# Patient Record
Sex: FEMALE | Race: WHITE | HISPANIC OR LATINO
[De-identification: names, ages, dates, MRNs, and addresses within clinical notes are randomized per-mention and may not be internally consistent; named-entity substitution may affect disease eponyms.]

---

## 2017-01-19 ENCOUNTER — RECORD ABSTRACTING (OUTPATIENT)
Age: 52
End: 2017-01-19

## 2017-01-19 DIAGNOSIS — G44.209 TENSION-TYPE HEADACHE, UNSPECIFIED, NOT INTRACTABLE: ICD-10-CM

## 2017-01-19 DIAGNOSIS — Z87.39 PERSONAL HISTORY OF OTHER DISEASES OF THE MUSCULOSKELETAL SYSTEM AND CONNECTIVE TISSUE: ICD-10-CM

## 2017-01-19 DIAGNOSIS — Z92.89 PERSONAL HISTORY OF OTHER MEDICAL TREATMENT: ICD-10-CM

## 2017-01-19 DIAGNOSIS — Z87.2 PERSONAL HISTORY OF DISEASES OF THE SKIN AND SUBCUTANEOUS TISSUE: ICD-10-CM

## 2017-01-19 DIAGNOSIS — Z83.3 FAMILY HISTORY OF DIABETES MELLITUS: ICD-10-CM

## 2017-01-19 DIAGNOSIS — N93.9 ABNORMAL UTERINE AND VAGINAL BLEEDING, UNSPECIFIED: ICD-10-CM

## 2017-01-19 DIAGNOSIS — Z86.2 PERSONAL HISTORY OF DISEASES OF THE BLOOD AND BLOOD-FORMING ORGANS AND CERTAIN DISORDERS INVOLVING THE IMMUNE MECHANISM: ICD-10-CM

## 2017-03-01 ENCOUNTER — RECORD ABSTRACTING (OUTPATIENT)
Age: 52
End: 2017-03-01

## 2017-03-01 DIAGNOSIS — Z86.018 PERSONAL HISTORY OF OTHER BENIGN NEOPLASM: ICD-10-CM

## 2017-03-01 DIAGNOSIS — N83.202 UNSPECIFIED OVARIAN CYST, LEFT SIDE: ICD-10-CM

## 2017-03-01 DIAGNOSIS — Z12.72 ENCOUNTER FOR SCREENING FOR MALIGNANT NEOPLASM OF VAGINA: ICD-10-CM

## 2017-03-01 DIAGNOSIS — Z78.9 OTHER SPECIFIED HEALTH STATUS: ICD-10-CM

## 2017-03-01 DIAGNOSIS — Z87.42 PERSONAL HISTORY OF OTHER DISEASES OF THE FEMALE GENITAL TRACT: ICD-10-CM

## 2018-09-12 ENCOUNTER — APPOINTMENT (OUTPATIENT)
Dept: INTERNAL MEDICINE | Facility: CLINIC | Age: 53
End: 2018-09-12

## 2018-09-17 ENCOUNTER — EMERGENCY (EMERGENCY)
Facility: HOSPITAL | Age: 53
LOS: 0 days | Discharge: HOME | End: 2018-09-17
Attending: EMERGENCY MEDICINE

## 2018-09-17 VITALS
OXYGEN SATURATION: 99 % | TEMPERATURE: 98 F | DIASTOLIC BLOOD PRESSURE: 71 MMHG | RESPIRATION RATE: 18 BRPM | HEART RATE: 71 BPM | SYSTOLIC BLOOD PRESSURE: 121 MMHG

## 2018-09-17 VITALS
HEART RATE: 68 BPM | OXYGEN SATURATION: 99 % | DIASTOLIC BLOOD PRESSURE: 57 MMHG | TEMPERATURE: 97 F | SYSTOLIC BLOOD PRESSURE: 114 MMHG | RESPIRATION RATE: 20 BRPM

## 2018-09-17 DIAGNOSIS — R10.9 UNSPECIFIED ABDOMINAL PAIN: ICD-10-CM

## 2018-09-17 DIAGNOSIS — R73.9 HYPERGLYCEMIA, UNSPECIFIED: ICD-10-CM

## 2018-09-17 DIAGNOSIS — K52.9 NONINFECTIVE GASTROENTERITIS AND COLITIS, UNSPECIFIED: ICD-10-CM

## 2018-09-17 DIAGNOSIS — R11.2 NAUSEA WITH VOMITING, UNSPECIFIED: ICD-10-CM

## 2018-09-17 DIAGNOSIS — R93.8 ABNORMAL FINDINGS ON DIAGNOSTIC IMAGING OF OTHER SPECIFIED BODY STRUCTURES: ICD-10-CM

## 2018-09-17 LAB
ALBUMIN SERPL ELPH-MCNC: 4 G/DL — SIGNIFICANT CHANGE UP (ref 3.5–5.2)
ALP SERPL-CCNC: 71 U/L — SIGNIFICANT CHANGE UP (ref 30–115)
ALT FLD-CCNC: 27 U/L — SIGNIFICANT CHANGE UP (ref 0–41)
ANION GAP SERPL CALC-SCNC: 16 MMOL/L — HIGH (ref 7–14)
APPEARANCE UR: ABNORMAL
AST SERPL-CCNC: 18 U/L — SIGNIFICANT CHANGE UP (ref 0–41)
BASOPHILS # BLD AUTO: 0.07 K/UL — SIGNIFICANT CHANGE UP (ref 0–0.2)
BASOPHILS NFR BLD AUTO: 0.5 % — SIGNIFICANT CHANGE UP (ref 0–1)
BILIRUB SERPL-MCNC: 0.5 MG/DL — SIGNIFICANT CHANGE UP (ref 0.2–1.2)
BILIRUB UR-MCNC: NEGATIVE — SIGNIFICANT CHANGE UP
BUN SERPL-MCNC: 12 MG/DL — SIGNIFICANT CHANGE UP (ref 10–20)
CALCIUM SERPL-MCNC: 8.8 MG/DL — SIGNIFICANT CHANGE UP (ref 8.5–10.1)
CHLORIDE SERPL-SCNC: 97 MMOL/L — LOW (ref 98–110)
CO2 SERPL-SCNC: 23 MMOL/L — SIGNIFICANT CHANGE UP (ref 17–32)
COLOR SPEC: SIGNIFICANT CHANGE UP
CREAT SERPL-MCNC: 0.6 MG/DL — LOW (ref 0.7–1.5)
DIFF PNL FLD: ABNORMAL
EOSINOPHIL # BLD AUTO: 0.08 K/UL — SIGNIFICANT CHANGE UP (ref 0–0.7)
EOSINOPHIL NFR BLD AUTO: 0.6 % — SIGNIFICANT CHANGE UP (ref 0–8)
GAS PNL BLDV: SIGNIFICANT CHANGE UP
GLUCOSE SERPL-MCNC: 295 MG/DL — HIGH (ref 70–99)
GLUCOSE UR QL: >=1000
HCT VFR BLD CALC: 42.6 % — SIGNIFICANT CHANGE UP (ref 37–47)
HGB BLD-MCNC: 14.8 G/DL — SIGNIFICANT CHANGE UP (ref 12–16)
IMM GRANULOCYTES NFR BLD AUTO: 0.3 % — SIGNIFICANT CHANGE UP (ref 0.1–0.3)
KETONES UR-MCNC: 15
LACTATE SERPL-SCNC: 1.1 MMOL/L — SIGNIFICANT CHANGE UP (ref 0.5–2.2)
LEUKOCYTE ESTERASE UR-ACNC: ABNORMAL
LIDOCAIN IGE QN: 65 U/L — HIGH (ref 7–60)
LYMPHOCYTES # BLD AUTO: 16.8 % — LOW (ref 20.5–51.1)
LYMPHOCYTES # BLD AUTO: 2.2 K/UL — SIGNIFICANT CHANGE UP (ref 1.2–3.4)
MCHC RBC-ENTMCNC: 29 PG — SIGNIFICANT CHANGE UP (ref 27–31)
MCHC RBC-ENTMCNC: 34.7 G/DL — SIGNIFICANT CHANGE UP (ref 32–37)
MCV RBC AUTO: 83.4 FL — SIGNIFICANT CHANGE UP (ref 81–99)
MONOCYTES # BLD AUTO: 0.76 K/UL — HIGH (ref 0.1–0.6)
MONOCYTES NFR BLD AUTO: 5.8 % — SIGNIFICANT CHANGE UP (ref 1.7–9.3)
NEUTROPHILS # BLD AUTO: 9.98 K/UL — HIGH (ref 1.4–6.5)
NEUTROPHILS NFR BLD AUTO: 76 % — HIGH (ref 42.2–75.2)
NITRITE UR-MCNC: NEGATIVE — SIGNIFICANT CHANGE UP
PH UR: 5.5 — SIGNIFICANT CHANGE UP (ref 5–8)
PLATELET # BLD AUTO: 210 K/UL — SIGNIFICANT CHANGE UP (ref 130–400)
POTASSIUM SERPL-MCNC: 4.4 MMOL/L — SIGNIFICANT CHANGE UP (ref 3.5–5)
POTASSIUM SERPL-SCNC: 4.4 MMOL/L — SIGNIFICANT CHANGE UP (ref 3.5–5)
PROT SERPL-MCNC: 7.2 G/DL — SIGNIFICANT CHANGE UP (ref 6–8)
PROT UR-MCNC: 100
RBC # BLD: 5.11 M/UL — SIGNIFICANT CHANGE UP (ref 4.2–5.4)
RBC # FLD: 12.9 % — SIGNIFICANT CHANGE UP (ref 11.5–14.5)
SODIUM SERPL-SCNC: 136 MMOL/L — SIGNIFICANT CHANGE UP (ref 135–146)
SP GR SPEC: >=1.03 — SIGNIFICANT CHANGE UP (ref 1.01–1.03)
UROBILINOGEN FLD QL: 0.2 — SIGNIFICANT CHANGE UP (ref 0.2–0.2)
WBC # BLD: 13.13 K/UL — HIGH (ref 4.8–10.8)
WBC # FLD AUTO: 13.13 K/UL — HIGH (ref 4.8–10.8)

## 2018-09-17 RX ORDER — ONDANSETRON 8 MG/1
4 TABLET, FILM COATED ORAL ONCE
Qty: 0 | Refills: 0 | Status: COMPLETED | OUTPATIENT
Start: 2018-09-17 | End: 2018-09-17

## 2018-09-17 RX ORDER — SODIUM CHLORIDE 9 MG/ML
1000 INJECTION, SOLUTION INTRAVENOUS ONCE
Qty: 0 | Refills: 0 | Status: COMPLETED | OUTPATIENT
Start: 2018-09-17 | End: 2018-09-17

## 2018-09-17 RX ORDER — METRONIDAZOLE 500 MG
1 TABLET ORAL
Qty: 21 | Refills: 0
Start: 2018-09-17 | End: 2018-09-23

## 2018-09-17 RX ORDER — CIPROFLOXACIN LACTATE 400MG/40ML
1 VIAL (ML) INTRAVENOUS
Qty: 14 | Refills: 0
Start: 2018-09-17 | End: 2018-09-23

## 2018-09-17 RX ADMIN — SODIUM CHLORIDE 2000 MILLILITER(S): 9 INJECTION, SOLUTION INTRAVENOUS at 11:20

## 2018-09-17 RX ADMIN — ONDANSETRON 4 MILLIGRAM(S): 8 TABLET, FILM COATED ORAL at 09:32

## 2018-09-17 RX ADMIN — SODIUM CHLORIDE 2000 MILLILITER(S): 9 INJECTION, SOLUTION INTRAVENOUS at 09:28

## 2018-09-17 RX ADMIN — SODIUM CHLORIDE 1000 MILLILITER(S): 9 INJECTION, SOLUTION INTRAVENOUS at 10:00

## 2018-09-17 NOTE — ED ADULT NURSE NOTE - NSIMPLEMENTINTERV_GEN_ALL_ED
Implemented All Universal Safety Interventions:  [place] to call system. Call bell, personal items and telephone within reach. Instruct patient to call for assistance. Room bathroom lighting operational. Non-slip footwear when patient is off stretcher. Physically safe environment: no spills, clutter or unnecessary equipment. Stretcher in lowest position, wheels locked, appropriate side rails in place.

## 2018-09-17 NOTE — ED PROVIDER NOTE - NS ED ROS FT
Constitutional: See HPI.  Eyes: No visual changes, eye pain or discharge. No Photophobia  ENMT: No neck pain or stiffness. No limited ROM  Cardiac: No SOB or edema. No chest pain with exertion.  Respiratory: No cough or respiratory distress. No hemoptysis. No history of asthma or RAD.  GI: see hpi  : No dysuria, frequency or burning. No Discharge  MS: No myalgia, muscle weakness, joint pain or back pain.  Neuro: No headache or weakness. No LOC.  Skin: No skin rash.  Except as documented in the HPI, all other systems are negative.

## 2018-09-17 NOTE — ED PROVIDER NOTE - CARE PLAN
Principal Discharge DX:	Colitis  Secondary Diagnosis:	Hyperglycemia  Secondary Diagnosis:	Endometrial thickening on ultrasound

## 2018-09-17 NOTE — ED PROVIDER NOTE - ATTENDING CONTRIBUTION TO CARE
53F no pmh/meds/psh, p/w 1 day diffuse crampy abd pain assoc w 6 episodes watery diarrhea. 1 episode emesis but states it was "clear phlegm." no fever, chills. no dysuria, freq, hematuria. no cp, sob, cough. has appt w PMD at [facility] clinic next week 9/26. post menopausal. reports 1 mo weight loss approx 50 lbs. on exam, AFVSS, well mesfin nad, ncat, eomi, perrla, DRYmm, lctab, rrr nl s1s2 no mrg, abd soft +LLQ mild ttp, no rebound or rigidity, nd, aaox3, no focal deficits, no le edema or calf ttp, a/p;  Concern for colitis/diverticultitis, dehydration, electrolyte abnl, will do labs, ua, ct a/p, ivf, pain control/antiemetics, re-eval.

## 2018-09-17 NOTE — ED PROVIDER NOTE - MEDICAL DECISION MAKING DETAILS
pt found to have colitis, tolerating po, feels better, abd soft ntnd, will dc home w cipro/flagyl. Needs outpt pelvic sono for endometrial thickening, will givf [facility] f/u 1-2 weeks, has appt w PMD next week already schedule 9/26, will f/u for colitis and likely new onset DM. Will need GI f/u as outpt as well 1-2 weeks. strict return precautions provided.

## 2018-09-17 NOTE — ED ADULT NURSE NOTE - OBJECTIVE STATEMENT
Pt c/o diarrhea, vomiting, abd pain since yesterday. Pt states symptoms began after family picnic. States she had 4 episodes of NBNB diarrhea, 1 episode NBNB vomiting. Abd pain dull, mild, non-radiating. AAO x 4. Denies SOB, CP, dizziness, weakness.

## 2018-09-17 NOTE — ED PROVIDER NOTE - PHYSICAL EXAMINATION
AOx4, Non toxic appearing, NAD, speaking in full sentences. Skin  warm and dry, no acute rash. Head normocephalic, atraumatic. PERRLA/EOMI, conjunctiva and sclera clear. MM moist, no nasal discharge.  Pharynx and TM's unremarkable.  No mastoid or temporal ttp. Neck supple nt, no meningeal signs. Heart RRR s1s2 nl, no rub/murmur. Lungs- No retractions, BS equal, CTAB. Abdomen soft, tender in lower quadrants b/l, nd no r/g. Extremities- moves all, +equal distal pulses, brisk cap refill, sensation wnl, normal ROM. No LE edema, calves nttp b/l.

## 2018-09-17 NOTE — ED PROVIDER NOTE - OBJECTIVE STATEMENT
54 yo F with no sig pmhx or pshx presents with NVD and abd pain. Started yesetrday after a family picnic. Diarrhea x 4 loose nonbloody nonmelanotic stool. Vomiting x 1 NBNB. Abd pain in dull nonradiating pain in  b/l lower quadrants. Associated with a reported 50 lbs weight loose over the past 1 month. Denies dysuria, fevers, chills, back pain, CP, SOB, palpitations, hair loss, diaphoresis.

## 2018-09-18 LAB
CULTURE RESULTS: SIGNIFICANT CHANGE UP
SPECIMEN SOURCE: SIGNIFICANT CHANGE UP

## 2018-09-27 ENCOUNTER — OUTPATIENT (OUTPATIENT)
Dept: OUTPATIENT SERVICES | Facility: HOSPITAL | Age: 53
LOS: 1 days | Discharge: HOME | End: 2018-09-27

## 2018-09-27 ENCOUNTER — APPOINTMENT (OUTPATIENT)
Dept: INTERNAL MEDICINE | Facility: CLINIC | Age: 53
End: 2018-09-27

## 2018-09-27 VITALS — DIASTOLIC BLOOD PRESSURE: 67 MMHG | SYSTOLIC BLOOD PRESSURE: 102 MMHG | HEART RATE: 55 BPM | WEIGHT: 137 LBS

## 2018-09-27 NOTE — REVIEW OF SYSTEMS
[Fatigue] : fatigue [Recent Change In Weight] : ~T recent weight change [Fever] : no fever [Chills] : no chills [Night Sweats] : no night sweats [Vision Problems] : no vision problems [Earache] : no earache [Hearing Loss] : no hearing loss [Hoarseness] : no hoarseness [Sore Throat] : no sore throat [Chest Pain] : no chest pain [Palpitations] : no palpitations [Lower Ext Edema] : no lower extremity edema [Shortness Of Breath] : no shortness of breath [Wheezing] : no wheezing [Cough] : no cough [Abdominal Pain] : no abdominal pain [Nausea] : no nausea [Constipation] : no constipation [Diarrhea] : diarrhea [Vomiting] : no vomiting [Dysuria] : no dysuria [Incontinence] : no incontinence [Hematuria] : no hematuria [Frequency] : no frequency [Joint Pain] : no joint pain [Joint Stiffness] : no joint stiffness [Itching] : no itching [Skin Rash] : no skin rash [Headache] : no headache [Dizziness] : no dizziness

## 2018-09-27 NOTE — PHYSICAL EXAM
[No Acute Distress] : no acute distress [Well Nourished] : well nourished [Well Developed] : well developed [Normal Sclera/Conjunctiva] : normal sclera/conjunctiva [No Respiratory Distress] : no respiratory distress  [Clear to Auscultation] : lungs were clear to auscultation bilaterally [No Accessory Muscle Use] : no accessory muscle use [Normal Rate] : normal rate  [Regular Rhythm] : with a regular rhythm [Normal S1, S2] : normal S1 and S2 [No Edema] : there was no peripheral edema [Soft] : abdomen soft [Non Tender] : non-tender [Non-distended] : non-distended

## 2018-09-27 NOTE — HISTORY OF PRESENT ILLNESS
[FreeTextEntry1] : Oscar [FreeTextEntry2] : [id number] [de-identified] : : Oscar [id number]\par \par 54 yo F with PMH listed presented to clinic to establish care at a new facility. Approximately 2 weeks ago patient see in ED for abdominal pain and nausea. Diagnosed with colitis and sent home with prescription for cipro an flagyl. PMD and GI follow up recommended. Patient also found to have endometrial thickening, gyn follow up recommended. Patient here today for general work up. She would like blood work completed and physical examination. Abdominal pain, n/v, has resolved. She has a gyn appointment 10/25/18 at 10:00 with Dr. Webber.Patient states she has had weight loss recently. She has lost 20 pounds in the last 1-2 months. She denies chills. Patient states she has not been eating less and her appetite has been well. Patient states that she has unintentionally lost the weight.

## 2018-09-29 ENCOUNTER — OTHER (OUTPATIENT)
Age: 53
End: 2018-09-29

## 2018-09-29 LAB
ALBUMIN SERPL ELPH-MCNC: 4.4 G/DL
ALP BLD-CCNC: 68 U/L
ALT SERPL-CCNC: 23 U/L
ANION GAP SERPL CALC-SCNC: 14 MMOL/L
AST SERPL-CCNC: 20 U/L
BASOPHILS # BLD AUTO: 0.06 K/UL
BASOPHILS NFR BLD AUTO: 1 %
BILIRUB SERPL-MCNC: 0.5 MG/DL
BUN SERPL-MCNC: 14 MG/DL
CALCIUM SERPL-MCNC: 9.7 MG/DL
CHLORIDE SERPL-SCNC: 101 MMOL/L
CHOLEST SERPL-MCNC: 209 MG/DL
CHOLEST/HDLC SERPL: 3.4 RATIO
CO2 SERPL-SCNC: 27 MMOL/L
CREAT SERPL-MCNC: 0.5 MG/DL
EOSINOPHIL # BLD AUTO: 0.33 K/UL
EOSINOPHIL NFR BLD AUTO: 5.4 %
ESTIMATED AVERAGE GLUCOSE: 303 MG/DL
GLUCOSE SERPL-MCNC: 151 MG/DL
HBA1C MFR BLD HPLC: 12.2 %
HCT VFR BLD CALC: 44.4 %
HDLC SERPL-MCNC: 61 MG/DL
HGB BLD-MCNC: 14.9 G/DL
HIV1+2 AB SPEC QL IA.RAPID: NONREACTIVE
IMM GRANULOCYTES NFR BLD AUTO: 0.2 %
LDLC SERPL CALC-MCNC: 147 MG/DL
LYMPHOCYTES # BLD AUTO: 2.9 K/UL
LYMPHOCYTES NFR BLD AUTO: 47.3 %
MAN DIFF?: NORMAL
MCHC RBC-ENTMCNC: 29.4 PG
MCHC RBC-ENTMCNC: 33.6 G/DL
MCV RBC AUTO: 87.6 FL
MONOCYTES # BLD AUTO: 0.38 K/UL
MONOCYTES NFR BLD AUTO: 6.2 %
NEUTROPHILS # BLD AUTO: 2.45 K/UL
NEUTROPHILS NFR BLD AUTO: 39.9 %
PLATELET # BLD AUTO: 244 K/UL
POTASSIUM SERPL-SCNC: 4.6 MMOL/L
PROT SERPL-MCNC: 7.7 G/DL
RBC # BLD: 5.07 M/UL
RBC # FLD: 13.5 %
SODIUM SERPL-SCNC: 142 MMOL/L
TRIGL SERPL-MCNC: 149 MG/DL
TSH SERPL-ACNC: 1.16 UIU/ML
WBC # FLD AUTO: 6.13 K/UL

## 2018-10-01 DIAGNOSIS — Z23 ENCOUNTER FOR IMMUNIZATION: ICD-10-CM

## 2018-10-01 DIAGNOSIS — R73.9 HYPERGLYCEMIA, UNSPECIFIED: ICD-10-CM

## 2018-10-02 ENCOUNTER — FORM ENCOUNTER (OUTPATIENT)
Age: 53
End: 2018-10-02

## 2018-10-03 ENCOUNTER — OUTPATIENT (OUTPATIENT)
Dept: OUTPATIENT SERVICES | Facility: HOSPITAL | Age: 53
LOS: 1 days | Discharge: HOME | End: 2018-10-03

## 2018-10-03 DIAGNOSIS — Z12.31 ENCOUNTER FOR SCREENING MAMMOGRAM FOR MALIGNANT NEOPLASM OF BREAST: ICD-10-CM

## 2018-10-25 ENCOUNTER — LABORATORY RESULT (OUTPATIENT)
Age: 53
End: 2018-10-25

## 2018-10-25 ENCOUNTER — OUTPATIENT (OUTPATIENT)
Dept: OUTPATIENT SERVICES | Facility: HOSPITAL | Age: 53
LOS: 1 days | Discharge: HOME | End: 2018-10-25

## 2018-10-25 ENCOUNTER — APPOINTMENT (OUTPATIENT)
Dept: INTERNAL MEDICINE | Facility: CLINIC | Age: 53
End: 2018-10-25

## 2018-10-25 ENCOUNTER — APPOINTMENT (OUTPATIENT)
Dept: OBGYN | Facility: CLINIC | Age: 53
End: 2018-10-25

## 2018-10-25 VITALS
WEIGHT: 139 LBS | DIASTOLIC BLOOD PRESSURE: 66 MMHG | HEART RATE: 60 BPM | SYSTOLIC BLOOD PRESSURE: 97 MMHG | BODY MASS INDEX: 23.16 KG/M2 | HEIGHT: 65 IN

## 2018-10-25 VITALS
SYSTOLIC BLOOD PRESSURE: 110 MMHG | BODY MASS INDEX: 22.49 KG/M2 | WEIGHT: 135 LBS | DIASTOLIC BLOOD PRESSURE: 60 MMHG | HEIGHT: 65 IN

## 2018-10-25 DIAGNOSIS — R93.8 ABNORMAL FINDINGS ON DIAGNOSTIC IMAGING OF OTHER SPECIFIED BODY STRUCTURES: ICD-10-CM

## 2018-10-25 LAB — GLUCOSE BLDC GLUCOMTR-MCNC: 88 MG/DL — SIGNIFICANT CHANGE UP (ref 70–99)

## 2018-10-25 NOTE — PHYSICAL EXAM
[No Acute Distress] : no acute distress [Well Nourished] : well nourished [Well Developed] : well developed [Normal Sclera/Conjunctiva] : normal sclera/conjunctiva [PERRL] : pupils equal round and reactive to light [Normal Outer Ear/Nose] : the outer ears and nose were normal in appearance [No JVD] : no jugular venous distention [Supple] : supple [No Respiratory Distress] : no respiratory distress  [Clear to Auscultation] : lungs were clear to auscultation bilaterally [No Accessory Muscle Use] : no accessory muscle use [Normal Rate] : normal rate  [Regular Rhythm] : with a regular rhythm [Normal S1, S2] : normal S1 and S2 [No Murmur] : no murmur heard [No Edema] : there was no peripheral edema [Soft] : abdomen soft [Non Tender] : non-tender [Non-distended] : non-distended [Normal Bowel Sounds] : normal bowel sounds [No CVA Tenderness] : no CVA  tenderness [No Joint Swelling] : no joint swelling [Grossly Normal Strength/Tone] : grossly normal strength/tone [No Rash] : no rash [Normal Gait] : normal gait [Normal Insight/Judgement] : insight and judgment were intact

## 2018-10-25 NOTE — ASSESSMENT
[FreeTextEntry1] : 52 yo F with pmhx endometrial thickening presents for follow up.\par \par # Possible newly diagnosed DM.\par - random FS in clinic: 88  and pt completely asymptomatic \par - repeat Hba1c. lab in 9/2018 might be an error\par - ASCVD: 1.7% \par \par # Endometrial thickening on US and CT abd and pelvis\par - follow up with Gyn as outpatient\par \par # HCM\par - flu vaccine UTD\par - mammogram (Bi- rad: 1) in 10/2018\par - pap smear UTD\par - colonoscopy scheduled in Nov

## 2018-10-25 NOTE — HISTORY OF PRESENT ILLNESS
[de-identified] :  #: [id number]\par \par 52 yo F with pmhx endometrial thickening presents for follow up. Pt is here for blood works result. Pt states that she is in her usual state of health. No fever, chills, fatigue, blurry vision, dizziness, SOB, CP, abd pain, N/V/D, numbness and tingling, polydipsia, polyuria. \par

## 2018-10-27 PROBLEM — R93.8 ENDOMETRIAL THICKENING ON ULTRASOUND: Status: RESOLVED | Noted: 2018-10-25 | Resolved: 2018-10-27

## 2018-10-30 DIAGNOSIS — N91.1 SECONDARY AMENORRHEA: ICD-10-CM

## 2018-10-30 DIAGNOSIS — Z01.419 ENCOUNTER FOR GYNECOLOGICAL EXAMINATION (GENERAL) (ROUTINE) WITHOUT ABNORMAL FINDINGS: ICD-10-CM

## 2018-10-30 LAB
A VAGINAE DNA VAG QL NAA+PROBE: NORMAL
BVAB2 DNA VAG QL NAA+PROBE: NORMAL
C KRUSEI DNA VAG QL NAA+PROBE: NEGATIVE
C TRACH RRNA SPEC QL NAA+PROBE: NEGATIVE
MEGA1 DNA VAG QL NAA+PROBE: NORMAL
N GONORRHOEA RRNA SPEC QL NAA+PROBE: NEGATIVE
T VAGINALIS RRNA SPEC QL NAA+PROBE: NEGATIVE

## 2018-11-01 DIAGNOSIS — R73.9 HYPERGLYCEMIA, UNSPECIFIED: ICD-10-CM

## 2018-11-03 LAB — HPV HIGH+LOW RISK DNA PNL CVX: NOT DETECTED

## 2018-11-09 ENCOUNTER — APPOINTMENT (OUTPATIENT)
Dept: GASTROENTEROLOGY | Facility: CLINIC | Age: 53
End: 2018-11-09

## 2019-10-11 ENCOUNTER — EMERGENCY (EMERGENCY)
Facility: HOSPITAL | Age: 54
LOS: 0 days | Discharge: HOME | End: 2019-10-12
Attending: EMERGENCY MEDICINE | Admitting: EMERGENCY MEDICINE
Payer: SUBSIDIZED

## 2019-10-11 VITALS
SYSTOLIC BLOOD PRESSURE: 118 MMHG | DIASTOLIC BLOOD PRESSURE: 58 MMHG | OXYGEN SATURATION: 100 % | RESPIRATION RATE: 18 BRPM | TEMPERATURE: 98 F | WEIGHT: 147.93 LBS | HEART RATE: 89 BPM | HEIGHT: 66 IN

## 2019-10-11 DIAGNOSIS — R93.89 ABNORMAL FINDINGS ON DIAGNOSTIC IMAGING OF OTHER SPECIFIED BODY STRUCTURES: ICD-10-CM

## 2019-10-11 DIAGNOSIS — R10.30 LOWER ABDOMINAL PAIN, UNSPECIFIED: ICD-10-CM

## 2019-10-11 DIAGNOSIS — N93.9 ABNORMAL UTERINE AND VAGINAL BLEEDING, UNSPECIFIED: ICD-10-CM

## 2019-10-11 DIAGNOSIS — R19.00 INTRA-ABDOMINAL AND PELVIC SWELLING, MASS AND LUMP, UNSPECIFIED SITE: ICD-10-CM

## 2019-10-11 LAB
ALBUMIN SERPL ELPH-MCNC: 3.9 G/DL — SIGNIFICANT CHANGE UP (ref 3.5–5.2)
ALP SERPL-CCNC: 58 U/L — SIGNIFICANT CHANGE UP (ref 30–115)
ALT FLD-CCNC: 15 U/L — SIGNIFICANT CHANGE UP (ref 0–41)
ANION GAP SERPL CALC-SCNC: 10 MMOL/L — SIGNIFICANT CHANGE UP (ref 7–14)
APPEARANCE UR: CLEAR — SIGNIFICANT CHANGE UP
AST SERPL-CCNC: 18 U/L — SIGNIFICANT CHANGE UP (ref 0–41)
BACTERIA # UR AUTO: NEGATIVE — SIGNIFICANT CHANGE UP
BASOPHILS # BLD AUTO: 0.08 K/UL — SIGNIFICANT CHANGE UP (ref 0–0.2)
BASOPHILS NFR BLD AUTO: 0.8 % — SIGNIFICANT CHANGE UP (ref 0–1)
BILIRUB DIRECT SERPL-MCNC: <0.2 MG/DL — SIGNIFICANT CHANGE UP (ref 0–0.2)
BILIRUB INDIRECT FLD-MCNC: SIGNIFICANT CHANGE UP MG/DL (ref 0.2–1.2)
BILIRUB SERPL-MCNC: <0.2 MG/DL — SIGNIFICANT CHANGE UP (ref 0.2–1.2)
BILIRUB UR-MCNC: NEGATIVE — SIGNIFICANT CHANGE UP
BUN SERPL-MCNC: 10 MG/DL — SIGNIFICANT CHANGE UP (ref 10–20)
CALCIUM SERPL-MCNC: 9.2 MG/DL — SIGNIFICANT CHANGE UP (ref 8.5–10.1)
CHLORIDE SERPL-SCNC: 101 MMOL/L — SIGNIFICANT CHANGE UP (ref 98–110)
CO2 SERPL-SCNC: 28 MMOL/L — SIGNIFICANT CHANGE UP (ref 17–32)
COLOR SPEC: SIGNIFICANT CHANGE UP
CREAT SERPL-MCNC: 0.5 MG/DL — LOW (ref 0.7–1.5)
DIFF PNL FLD: ABNORMAL
EOSINOPHIL # BLD AUTO: 0.26 K/UL — SIGNIFICANT CHANGE UP (ref 0–0.7)
EOSINOPHIL NFR BLD AUTO: 2.5 % — SIGNIFICANT CHANGE UP (ref 0–8)
EPI CELLS # UR: 1 /HPF — SIGNIFICANT CHANGE UP (ref 0–5)
GLUCOSE SERPL-MCNC: 136 MG/DL — HIGH (ref 70–99)
GLUCOSE UR QL: NEGATIVE — SIGNIFICANT CHANGE UP
HCT VFR BLD CALC: 33.4 % — LOW (ref 37–47)
HGB BLD-MCNC: 10.6 G/DL — LOW (ref 12–16)
HYALINE CASTS # UR AUTO: 0 /LPF — SIGNIFICANT CHANGE UP (ref 0–7)
IMM GRANULOCYTES NFR BLD AUTO: 0.3 % — SIGNIFICANT CHANGE UP (ref 0.1–0.3)
KETONES UR-MCNC: NEGATIVE — SIGNIFICANT CHANGE UP
LACTATE SERPL-SCNC: 1.2 MMOL/L — SIGNIFICANT CHANGE UP (ref 0.5–2.2)
LEUKOCYTE ESTERASE UR-ACNC: NEGATIVE — SIGNIFICANT CHANGE UP
LIDOCAIN IGE QN: 41 U/L — SIGNIFICANT CHANGE UP (ref 7–60)
LYMPHOCYTES # BLD AUTO: 2.77 K/UL — SIGNIFICANT CHANGE UP (ref 1.2–3.4)
LYMPHOCYTES # BLD AUTO: 26.4 % — SIGNIFICANT CHANGE UP (ref 20.5–51.1)
MAGNESIUM SERPL-MCNC: 2.2 MG/DL — SIGNIFICANT CHANGE UP (ref 1.8–2.4)
MCHC RBC-ENTMCNC: 25.7 PG — LOW (ref 27–31)
MCHC RBC-ENTMCNC: 31.7 G/DL — LOW (ref 32–37)
MCV RBC AUTO: 81.1 FL — SIGNIFICANT CHANGE UP (ref 81–99)
MONOCYTES # BLD AUTO: 0.97 K/UL — HIGH (ref 0.1–0.6)
MONOCYTES NFR BLD AUTO: 9.2 % — SIGNIFICANT CHANGE UP (ref 1.7–9.3)
NEUTROPHILS # BLD AUTO: 6.38 K/UL — SIGNIFICANT CHANGE UP (ref 1.4–6.5)
NEUTROPHILS NFR BLD AUTO: 60.8 % — SIGNIFICANT CHANGE UP (ref 42.2–75.2)
NITRITE UR-MCNC: NEGATIVE — SIGNIFICANT CHANGE UP
NRBC # BLD: 0 /100 WBCS — SIGNIFICANT CHANGE UP (ref 0–0)
PH UR: 7.5 — SIGNIFICANT CHANGE UP (ref 5–8)
PLATELET # BLD AUTO: 375 K/UL — SIGNIFICANT CHANGE UP (ref 130–400)
POTASSIUM SERPL-MCNC: 4.6 MMOL/L — SIGNIFICANT CHANGE UP (ref 3.5–5)
POTASSIUM SERPL-SCNC: 4.6 MMOL/L — SIGNIFICANT CHANGE UP (ref 3.5–5)
PROT SERPL-MCNC: 7.5 G/DL — SIGNIFICANT CHANGE UP (ref 6–8)
PROT UR-MCNC: NEGATIVE — SIGNIFICANT CHANGE UP
RBC # BLD: 4.12 M/UL — LOW (ref 4.2–5.4)
RBC # FLD: 16.2 % — HIGH (ref 11.5–14.5)
RBC CASTS # UR COMP ASSIST: 20 /HPF — HIGH (ref 0–4)
SODIUM SERPL-SCNC: 139 MMOL/L — SIGNIFICANT CHANGE UP (ref 135–146)
SP GR SPEC: 1.01 — SIGNIFICANT CHANGE UP (ref 1.01–1.02)
UROBILINOGEN FLD QL: SIGNIFICANT CHANGE UP
WBC # BLD: 10.49 K/UL — SIGNIFICANT CHANGE UP (ref 4.8–10.8)
WBC # FLD AUTO: 10.49 K/UL — SIGNIFICANT CHANGE UP (ref 4.8–10.8)
WBC UR QL: 2 /HPF — SIGNIFICANT CHANGE UP (ref 0–5)

## 2019-10-11 PROCEDURE — 76830 TRANSVAGINAL US NON-OB: CPT | Mod: 26

## 2019-10-11 PROCEDURE — 71046 X-RAY EXAM CHEST 2 VIEWS: CPT | Mod: 26

## 2019-10-11 PROCEDURE — 93010 ELECTROCARDIOGRAM REPORT: CPT

## 2019-10-11 PROCEDURE — 99284 EMERGENCY DEPT VISIT MOD MDM: CPT

## 2019-10-11 RX ORDER — IOHEXOL 300 MG/ML
30 INJECTION, SOLUTION INTRAVENOUS ONCE
Refills: 0 | Status: COMPLETED | OUTPATIENT
Start: 2019-10-11 | End: 2019-10-11

## 2019-10-11 RX ADMIN — IOHEXOL 30 MILLILITER(S): 300 INJECTION, SOLUTION INTRAVENOUS at 22:20

## 2019-10-11 NOTE — ED PROVIDER NOTE - CLINICAL SUMMARY MEDICAL DECISION MAKING FREE TEXT BOX
54f w diffuse lower abdominal pain and post-menopausal vaginal bleeding concerning for malignancy. nontoxic appearing, n/v intact. Labs, EKG, & imaging reviewed. Pt/family advised regarding symptomatic/supportive care, importance of PMD/Gyn-Onc f/u, and symptoms to prompt ED return. Copy of results given to patient.

## 2019-10-11 NOTE — ED PROVIDER NOTE - ATTENDING CONTRIBUTION TO CARE
54f w a hx of pre-DM. Pt reports 5 days of diffuse lower abdominal pain R>L. Pain is sharp, moderate, constant, no radiating, no exacerbating/alleviating. Pt also reporting post-menopausal vaginal bleeding. No vomit/diarrhea, no black/bloody stools.    Pt offered  services but prefers family to translate.     Review of Systems  Constitutional:  No fever or chills.   Eyes:  Negative.   ENMT:  No nasal congestion, discharge, or throat pain.   Cardiac:  No chest pain, syncope, or edema.  Respiratory:  No dyspnea, wheezing, or cough. No hemoptysis.  GI:  See HPI  :  No dysuria. +Vaginal bleeding  Musculoskeletal:  No joint swelling, joint pain, or back pain.  Skin:  No skin rash, jaundice, or lesions.  Neuro:  No headache, loss of sensation, or focal weakness.  No change in mental status.    Physical Exam  General: Awake, alert, NAD, WDWN, non-toxic appearing, NCAT  Eyes: PERRL, EOMI, no icterus, lids and conjunctivae are normal  ENT: External inspection normal, pink/moist membranes, pharynx normal  CV: S1S2, regular rate and rhythm, no murmur/gallops/rubs, no JVD, 2+ pulses b/l, no edema/cords/homans, warm/well-perfused  Respiratory: Normal respiratory rate/effort, no respiratory distress, normal voice, speaking full sentences, lungs clear to auscultation b/l, no wheezing/rales/rhonchi, no retractions, no stridor  Abdomen: Soft abdomen, diffuse lower abd tender, no distended/guarding/rebound, no CVA tender  :  Musculoskeletal: FROM all 4 extremities, N/V intact, stable gait  Neck: FROM neck, supple, no meningismus, trachea midline, no JVD  Integumentary: Color normal for race, warm and dry, no rash  Neuro: Oriented x3, CN 2-12 grossly intact, normal motor, normal sensory  Psych: Oriented x3, mood normal, affect normal     54f w diffuse lower abdominal pain and post-menopausal vaginal bleeding concerning for malignancy. nontoxic appearing, n/v intact. --Labs, US pelvis, CT abd, Analgesia/antiemetics as needed, Gyn as needed, observe/re-assess. 54f w a hx of pre-DM. Pt reports 5 days of diffuse lower abdominal pain R>L. Pain is sharp, moderate, constant, no radiating, no exacerbating/alleviating. Pt also reporting post-menopausal vaginal bleeding. No vomit/diarrhea, no black/bloody stools.    Pt offered  services but prefers family to translate.     Review of Systems  Constitutional:  No fever or chills.   Eyes:  Negative.   ENMT:  No nasal congestion, discharge, or throat pain.   Cardiac:  No chest pain, syncope, or edema.  Respiratory:  No dyspnea, wheezing, or cough. No hemoptysis.  GI:  See HPI  :  No dysuria. +Vaginal bleeding  Musculoskeletal:  No joint swelling, joint pain, or back pain.  Skin:  No skin rash, jaundice, or lesions.  Neuro:  No headache, loss of sensation, or focal weakness.  No change in mental status.    Physical Exam  General: Awake, alert, NAD, WDWN, non-toxic appearing, NCAT  Eyes: PERRL, EOMI, no icterus, lids and conjunctivae are normal  ENT: External inspection normal, pink/moist membranes, pharynx normal  CV: S1S2, regular rate and rhythm, no murmur/gallops/rubs, no JVD, 2+ pulses b/l, no edema/cords/homans, warm/well-perfused  Respiratory: Normal respiratory rate/effort, no respiratory distress, normal voice, speaking full sentences, lungs clear to auscultation b/l, no wheezing/rales/rhonchi, no retractions, no stridor  Abdomen: Soft abdomen, diffuse lower abd tender, no distended/guarding/rebound, no CVA tender  : As per Dr Cole  Musculoskeletal: FROM all 4 extremities, N/V intact, stable gait  Neck: FROM neck, supple, no meningismus, trachea midline, no JVD  Integumentary: Color normal for race, warm and dry, no rash  Neuro: Oriented x3, CN 2-12 grossly intact, normal motor, normal sensory  Psych: Oriented x3, mood normal, affect normal     54f w diffuse lower abdominal pain and post-menopausal vaginal bleeding concerning for malignancy. nontoxic appearing, n/v intact. --Labs, US pelvis, CT abd, Analgesia/antiemetics as needed, Gyn as needed, observe/re-assess.

## 2019-10-11 NOTE — ED PROVIDER NOTE - PATIENT PORTAL LINK FT
PATIENT STOPPED IN THE [facility] REQUESTING LETTER FROM MADONNA CORDERO. STATES SHE IS AWARE OF THIS. SENT INBOX TO [facility]UR[facility] [place].     You can access the FollowMyHealth Patient Portal offered by [facility] by registering at the following website: [email or web address]. By joining [facility]’s FollowMyHealth portal, you will also be able to view your health information using other applications (apps) compatible with our system.

## 2019-10-11 NOTE — ED ADULT TRIAGE NOTE - CHIEF COMPLAINT QUOTE
Groin pain starting 2 weeks ago, worsening today. heavy bleeding on periods, last period yesterday. pain when urinating.

## 2019-10-11 NOTE — ED PROVIDER NOTE - PHYSICAL EXAMINATION
VITAL SIGNS: I have reviewed nursing notes and confirm.  CONSTITUTIONAL: Well-developed; well-nourished; in no acute distress. pt comfortable.  SKIN: skin exam is warm and dry, no acute rash.   HEAD: Normocephalic; atraumatic.  EYES:  EOM intact; conjunctiva and sclera clear.  ENT: No nasal discharge; airway clear. moist oral mucosa;  NECK: Supple; non tender.  CARD: S1, S2 normal; no murmurs, gallops, or rubs. Regular rate and rhythm. posterior tibial and radial pulses 2+  RESP: No wheezes, rales or rhonchi. cta b/l. no use of accessory muscles. no retractions  ABD: Normal bowel sounds; soft; non-distended; non-tender; no rebound. negative psoas, rovsign's and murphys.  pelvic exam: mild blood pooling. no abnormal discharge. no abnormalities to cervix.  no adnexal masses felt.  EXT: Normal ROM. No  cyanosis or edema.  BACK: No cva tenderness  LYMPH: No acute cervical adenopathy.  NEURO: Alert, oriented, grossly unremarkable.    PSYCH: Cooperative, appropriate.

## 2019-10-11 NOTE — ED PROVIDER NOTE - ADDITIONAL RISK FACTOR FREE TEXT BOX
54f w diffuse lower abdominal pain and post-menopausal vaginal bleeding concerning for malignancy. nontoxic appearing, n/v intact. Labs, EKG, & imaging reviewed.

## 2019-10-11 NOTE — ED PROVIDER NOTE - CARE PROVIDER_API CALL
Hipolito Castillo)  Obstetrics and Gynecology  [address] [place] [address]  Phone: [phone]  Fax: [phone]  Follow Up Time:

## 2019-10-11 NOTE — ED PROVIDER NOTE - CARE PLAN
Principal Discharge DX:	Vaginal bleeding  Secondary Diagnosis:	Endometrial thickening on ultrasound Principal Discharge DX:	Vaginal bleeding  Secondary Diagnosis:	Endometrial thickening on ultrasound  Secondary Diagnosis:	Pelvic mass in female

## 2019-10-11 NOTE — ED PROVIDER NOTE - OBJECTIVE STATEMENT
55y/o F w hx of pre-diabetes presents 2 weeks of worsening 10/10 throbbing pelvic pain, dysuria and heavy periods for 2 weeks.  pt was post menopausal for 5 years, pt has had 5 days of bleeing.  no fevers or chills.pt also with some white pink discharge. desnies hx of stds, denies being sexually active.

## 2019-10-11 NOTE — ED ADULT NURSE NOTE - NSIMPLEMENTINTERV_GEN_ALL_ED
Implemented All Universal Safety Interventions:  [place] to call system. Call bell, personal items and telephone within reach. Instruct patient to call for assistance. Room bathroom lighting operational. Non-slip footwear when patient is off stretcher. Physically safe environment: no spills, clutter or unnecessary equipment. Stretcher in lowest position, wheels locked, appropriate side rails in place.

## 2019-10-11 NOTE — ED PROVIDER NOTE - NSFOLLOWUPINSTRUCTIONS_ED_ALL_ED_FT
Patient to be discharged from ED. Any available test results were discussed with patient and/or family. Verbal instructions given, including instructions to return to ED immediately for any new, worsening, or concerning symptoms. Patient endorsed understanding. Written discharge instructions additionally given, including follow-up plan.    Por favor siga con morelos ginecologo.

## 2019-10-11 NOTE — ED ADULT NURSE NOTE - OBJECTIVE STATEMENT
Pt presents to ED c/o groin pain for 2 weeks, worsening in severity today. Pt reports heavy periods and pain while urinating. No s/s distress observed.

## 2019-10-12 VITALS
SYSTOLIC BLOOD PRESSURE: 107 MMHG | DIASTOLIC BLOOD PRESSURE: 67 MMHG | RESPIRATION RATE: 18 BRPM | HEART RATE: 77 BPM | TEMPERATURE: 98 F | OXYGEN SATURATION: 100 %

## 2019-10-12 PROCEDURE — 74177 CT ABD & PELVIS W/CONTRAST: CPT | Mod: 26

## 2019-10-13 LAB
CULTURE RESULTS: SIGNIFICANT CHANGE UP
SPECIMEN SOURCE: SIGNIFICANT CHANGE UP

## 2019-11-12 ENCOUNTER — LABORATORY RESULT (OUTPATIENT)
Age: 54
End: 2019-11-12

## 2019-11-12 ENCOUNTER — APPOINTMENT (OUTPATIENT)
Dept: OBGYN | Facility: CLINIC | Age: 54
End: 2019-11-12
Payer: COMMERCIAL

## 2019-11-12 ENCOUNTER — OUTPATIENT (OUTPATIENT)
Dept: OUTPATIENT SERVICES | Facility: HOSPITAL | Age: 54
LOS: 1 days | Discharge: HOME | End: 2019-11-12

## 2019-11-12 VITALS
DIASTOLIC BLOOD PRESSURE: 72 MMHG | BODY MASS INDEX: 23.72 KG/M2 | WEIGHT: 142.38 LBS | SYSTOLIC BLOOD PRESSURE: 120 MMHG | HEIGHT: 65 IN

## 2019-11-12 DIAGNOSIS — Z01.419 ENCOUNTER FOR GYNECOLOGICAL EXAMINATION (GENERAL) (ROUTINE) W/OUT ABNORMAL FINDINGS: ICD-10-CM

## 2019-11-12 PROCEDURE — 58100 BIOPSY OF UTERUS LINING: CPT

## 2019-11-12 PROCEDURE — 99213 OFFICE O/P EST LOW 20 MIN: CPT | Mod: 25

## 2019-11-12 PROCEDURE — 99396 PREV VISIT EST AGE 40-64: CPT

## 2019-11-12 NOTE — PHYSICAL EXAM
[Awake] : awake [Alert] : alert [Soft] : soft [Oriented x3] : oriented to person, place, and time [Vulvar Atrophy] : vulvar atrophy [Normal] : vagina [No Bleeding] : there was no active vaginal bleeding [Atrophy] : atrophy [Anteversion] : anteverted [Acute Distress] : no acute distress [Mass] : no breast mass [Nipple Discharge] : no nipple discharge [Axillary LAD] : no axillary lymphadenopathy [Tender] : non tender [Discharge] : had no discharge [Motion Tenderness] : there was no cervical motion tenderness [Adnexa Tenderness] : were not tender [FreeTextEntry5] : cervix closed, no mass seen through os. On bimanual exam, cervix feels normal, but is displaced posteriorly by hard mass that pushes the anterior fornix [FreeTextEntry7] : uterus enlarged to 16 weeks, hard

## 2019-11-12 NOTE — PROCEDURE
[Endometrial Biopsy] : Endometrial biopsy [Post-Menop. Bleeding] : post-menopausal bleeding [Risks] : risks [Alternatives] : alternatives [Benefits] : benefits [Patient] : patient [Infection] : infection [Bleeding] : bleeding [Allergic Reaction] : allergic reaction [CONSENT OBTAINED] : written consent was obtained prior to the procedure. [Uterine Perforation] : uterine perforation [Pain] : pain [Betadine] : Betadine [None] : none [Tenaculum] : a single toothed tenaculum [Easy Passage] : allowed easy passage of a uterine sound without dilation [Sounded to ___ cm] : sounded to [unfilled] ~Ucm [Pipelle] : a Pipelle endometrial suction curette [Sent to Histology] : the specimen was place in buffered formalin and sent for pathlogy [Abundant] : an abundant [Tolerated Well] : the patient tolerated the procedure well [No Complications] : there were no complications

## 2019-11-12 NOTE — REVIEW OF SYSTEMS
[Abdominal Pain] : abdominal pain [Abn Vag Bleeding] : abnormal vaginal bleeding [Nl] : Integumentary

## 2019-11-15 ENCOUNTER — OUTPATIENT (OUTPATIENT)
Dept: OUTPATIENT SERVICES | Facility: HOSPITAL | Age: 54
LOS: 1 days | Discharge: HOME | End: 2019-11-15

## 2019-11-15 ENCOUNTER — APPOINTMENT (OUTPATIENT)
Dept: GYNECOLOGIC ONCOLOGY | Facility: CLINIC | Age: 54
End: 2019-11-15
Payer: MEDICAID

## 2019-11-15 VITALS
WEIGHT: 142 LBS | SYSTOLIC BLOOD PRESSURE: 106 MMHG | RESPIRATION RATE: 16 BRPM | HEART RATE: 88 BPM | HEIGHT: 65 IN | TEMPERATURE: 97.9 F | DIASTOLIC BLOOD PRESSURE: 75 MMHG | BODY MASS INDEX: 23.66 KG/M2

## 2019-11-15 PROCEDURE — 99205 OFFICE O/P NEW HI 60 MIN: CPT

## 2019-11-18 LAB
ANION GAP SERPL CALC-SCNC: 16 MMOL/L
BASOPHILS # BLD AUTO: 0.11 K/UL
BASOPHILS NFR BLD AUTO: 1 %
BUN SERPL-MCNC: 9 MG/DL
CALCIUM SERPL-MCNC: 9.2 MG/DL
CHLORIDE SERPL-SCNC: 98 MMOL/L
CO2 SERPL-SCNC: 22 MMOL/L
CREAT SERPL-MCNC: 0.6 MG/DL
EOSINOPHIL # BLD AUTO: 0.38 K/UL
EOSINOPHIL NFR BLD AUTO: 3.4 %
GLUCOSE SERPL-MCNC: 85 MG/DL
HCT VFR BLD CALC: 31.6 %
HGB BLD-MCNC: 9.5 G/DL
IMM GRANULOCYTES NFR BLD AUTO: 0.4 %
LYMPHOCYTES # BLD AUTO: 2.74 K/UL
LYMPHOCYTES NFR BLD AUTO: 24.6 %
MAN DIFF?: NORMAL
MCHC RBC-ENTMCNC: 22.3 PG
MCHC RBC-ENTMCNC: 30.1 G/DL
MCV RBC AUTO: 74.2 FL
MONOCYTES # BLD AUTO: 0.92 K/UL
MONOCYTES NFR BLD AUTO: 8.2 %
NEUTROPHILS # BLD AUTO: 6.96 K/UL
NEUTROPHILS NFR BLD AUTO: 62.4 %
PLATELET # BLD AUTO: 433 K/UL
POTASSIUM SERPL-SCNC: 4.6 MMOL/L
RBC # BLD: 4.26 M/UL
RBC # FLD: 17.8 %
SODIUM SERPL-SCNC: 136 MMOL/L
WBC # FLD AUTO: 11.16 K/UL

## 2019-11-18 NOTE — HISTORY OF PRESENT ILLNESS
[FreeTextEntry1] : This is a 55 yo with postmenopausal bleeding and pelvic pain.  Pt went to ED for symptoms 10/2019 was no evaluated by gyn but was found to have ES 2.9cm, tissue mass in cervical canal, enlarged uterus, and lymphadenopathy.  Pt f/u outpt with gyn who performed EMBx that showed endometrial cancer.\par \par Referred by: Dr Webber\par \par Pathology: well to moderately differentiated endometrioid carcinoma with focal squamous differentiation and focal clear cell change, areas of high grade nuclei and prominent mitotic activity\par \par Last Pap Smear: 10/2018 NILM\par Last mammogram:10/2018 BIRADS1, dense breasts\par Last colonoscopy: never\par \par Imaging:  \par TVUS: uterus 14.9x10x8.3cm, ES 2.9cm, in JANE , likely in cervical canal there is soft tissue mass 3.5x2.7x4.4cm, R ovary 3x2.2x2.4cm, L ovary 3.7x2.3x1.8cm with cyst 1.1x1x0.9cm\par \par CT abd/pelvis: New right iliac chain lymph node measures at least 1.5x1.4cm. Enlarged 2.2x1.8cm portacaval lymph nodes are noted as well. Interval enlargement of the heterogeneous uterus, with irregular, fluid-filled endometrial canal and areas of myometrial cavitation. A new right lateral exophytic soft tissue component appears new since prior. Mild surrounding reactive fluid. Suspicious for neoplastic process. \par \par \par The patient comes today to discuss management recommendations.\par

## 2019-11-18 NOTE — PHYSICAL EXAM
[Normal] : Cervix: Normal, no lesions [Abnormal] : Parametria: Abnormal [de-identified] : 15wk sized uterus limited mobility  [de-identified] : unable to appreciate [de-identified] : parametria involved, bilateral sidewalls free

## 2019-11-18 NOTE — ASSESSMENT
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

## 2019-11-19 ENCOUNTER — APPOINTMENT (OUTPATIENT)
Dept: OBGYN | Facility: CLINIC | Age: 54
End: 2019-11-19
Payer: MEDICAID

## 2019-11-19 ENCOUNTER — OUTPATIENT (OUTPATIENT)
Dept: OUTPATIENT SERVICES | Facility: HOSPITAL | Age: 54
LOS: 1 days | Discharge: HOME | End: 2019-11-19

## 2019-11-19 VITALS
BODY MASS INDEX: 24.02 KG/M2 | WEIGHT: 144.19 LBS | DIASTOLIC BLOOD PRESSURE: 74 MMHG | HEIGHT: 65 IN | SYSTOLIC BLOOD PRESSURE: 114 MMHG

## 2019-11-19 DIAGNOSIS — N95.0 POSTMENOPAUSAL BLEEDING: ICD-10-CM

## 2019-11-19 DIAGNOSIS — R92.2 INCONCLUSIVE MAMMOGRAM: ICD-10-CM

## 2019-11-19 DIAGNOSIS — Z01.419 ENCOUNTER FOR GYNECOLOGICAL EXAMINATION (GENERAL) (ROUTINE) WITHOUT ABNORMAL FINDINGS: ICD-10-CM

## 2019-11-19 PROCEDURE — 99213 OFFICE O/P EST LOW 20 MIN: CPT

## 2019-11-20 ENCOUNTER — OUTPATIENT (OUTPATIENT)
Dept: OUTPATIENT SERVICES | Facility: HOSPITAL | Age: 54
LOS: 1 days | Discharge: HOME | End: 2019-11-20
Payer: MEDICAID

## 2019-11-20 VITALS
DIASTOLIC BLOOD PRESSURE: 60 MMHG | TEMPERATURE: 97 F | HEART RATE: 84 BPM | RESPIRATION RATE: 20 BRPM | WEIGHT: 141.76 LBS | OXYGEN SATURATION: 100 % | HEIGHT: 62 IN | SYSTOLIC BLOOD PRESSURE: 121 MMHG

## 2019-11-20 DIAGNOSIS — D25.9 LEIOMYOMA OF UTERUS, UNSPECIFIED: ICD-10-CM

## 2019-11-20 DIAGNOSIS — Z01.818 ENCOUNTER FOR OTHER PREPROCEDURAL EXAMINATION: ICD-10-CM

## 2019-11-20 DIAGNOSIS — C55 MALIGNANT NEOPLASM OF UTERUS, PART UNSPECIFIED: ICD-10-CM

## 2019-11-20 LAB
ALBUMIN SERPL ELPH-MCNC: 3.9 G/DL — SIGNIFICANT CHANGE UP (ref 3.5–5.2)
ALP SERPL-CCNC: 55 U/L — SIGNIFICANT CHANGE UP (ref 30–115)
ALT FLD-CCNC: 10 U/L — SIGNIFICANT CHANGE UP (ref 0–41)
ANION GAP SERPL CALC-SCNC: 16 MMOL/L — HIGH (ref 7–14)
APPEARANCE UR: ABNORMAL
APTT BLD: 31.2 SEC — SIGNIFICANT CHANGE UP (ref 27–39.2)
AST SERPL-CCNC: 16 U/L — SIGNIFICANT CHANGE UP (ref 0–41)
BASOPHILS # BLD AUTO: 0.11 K/UL — SIGNIFICANT CHANGE UP (ref 0–0.2)
BASOPHILS NFR BLD AUTO: 1.3 % — HIGH (ref 0–1)
BILIRUB SERPL-MCNC: 0.3 MG/DL — SIGNIFICANT CHANGE UP (ref 0.2–1.2)
BILIRUB UR-MCNC: NEGATIVE — SIGNIFICANT CHANGE UP
BUN SERPL-MCNC: 11 MG/DL — SIGNIFICANT CHANGE UP (ref 10–20)
CALCIUM SERPL-MCNC: 9.2 MG/DL — SIGNIFICANT CHANGE UP (ref 8.5–10.1)
CHLORIDE SERPL-SCNC: 102 MMOL/L — SIGNIFICANT CHANGE UP (ref 98–110)
CO2 SERPL-SCNC: 22 MMOL/L — SIGNIFICANT CHANGE UP (ref 17–32)
COLOR SPEC: YELLOW — SIGNIFICANT CHANGE UP
CREAT SERPL-MCNC: 0.5 MG/DL — LOW (ref 0.7–1.5)
DIFF PNL FLD: ABNORMAL
EOSINOPHIL # BLD AUTO: 0.35 K/UL — SIGNIFICANT CHANGE UP (ref 0–0.7)
EOSINOPHIL NFR BLD AUTO: 4.2 % — SIGNIFICANT CHANGE UP (ref 0–8)
ESTIMATED AVERAGE GLUCOSE: 154 MG/DL — HIGH (ref 68–114)
GLUCOSE SERPL-MCNC: 107 MG/DL — HIGH (ref 70–99)
GLUCOSE UR QL: NEGATIVE — SIGNIFICANT CHANGE UP
HBA1C BLD-MCNC: 7 % — HIGH (ref 4–5.6)
HCT VFR BLD CALC: 31 % — LOW (ref 37–47)
HGB BLD-MCNC: 9.4 G/DL — LOW (ref 12–16)
IMM GRANULOCYTES NFR BLD AUTO: 0.5 % — HIGH (ref 0.1–0.3)
INR BLD: 1.19 RATIO — SIGNIFICANT CHANGE UP (ref 0.65–1.3)
KETONES UR-MCNC: NEGATIVE — SIGNIFICANT CHANGE UP
LEUKOCYTE ESTERASE UR-ACNC: ABNORMAL
LYMPHOCYTES # BLD AUTO: 2.19 K/UL — SIGNIFICANT CHANGE UP (ref 1.2–3.4)
LYMPHOCYTES # BLD AUTO: 26 % — SIGNIFICANT CHANGE UP (ref 20.5–51.1)
MCHC RBC-ENTMCNC: 22.2 PG — LOW (ref 27–31)
MCHC RBC-ENTMCNC: 30.3 G/DL — LOW (ref 32–37)
MCV RBC AUTO: 73.1 FL — LOW (ref 81–99)
MONOCYTES # BLD AUTO: 0.75 K/UL — HIGH (ref 0.1–0.6)
MONOCYTES NFR BLD AUTO: 8.9 % — SIGNIFICANT CHANGE UP (ref 1.7–9.3)
NEUTROPHILS # BLD AUTO: 4.98 K/UL — SIGNIFICANT CHANGE UP (ref 1.4–6.5)
NEUTROPHILS NFR BLD AUTO: 59.1 % — SIGNIFICANT CHANGE UP (ref 42.2–75.2)
NITRITE UR-MCNC: NEGATIVE — SIGNIFICANT CHANGE UP
NRBC # BLD: 0 /100 WBCS — SIGNIFICANT CHANGE UP (ref 0–0)
PH UR: 6.5 — SIGNIFICANT CHANGE UP (ref 5–8)
PLATELET # BLD AUTO: 437 K/UL — HIGH (ref 130–400)
POTASSIUM SERPL-MCNC: 4.8 MMOL/L — SIGNIFICANT CHANGE UP (ref 3.5–5)
POTASSIUM SERPL-SCNC: 4.8 MMOL/L — SIGNIFICANT CHANGE UP (ref 3.5–5)
PROT SERPL-MCNC: 8.2 G/DL — HIGH (ref 6–8)
PROT UR-MCNC: ABNORMAL
PROTHROM AB SERPL-ACNC: 13.7 SEC — HIGH (ref 9.95–12.87)
RBC # BLD: 4.24 M/UL — SIGNIFICANT CHANGE UP (ref 4.2–5.4)
RBC # FLD: 17.8 % — HIGH (ref 11.5–14.5)
RBC CASTS # UR COMP ASSIST: SIGNIFICANT CHANGE UP /HPF (ref 0–4)
SODIUM SERPL-SCNC: 140 MMOL/L — SIGNIFICANT CHANGE UP (ref 135–146)
SP GR SPEC: 1.02 — SIGNIFICANT CHANGE UP (ref 1.01–1.02)
UROBILINOGEN FLD QL: SIGNIFICANT CHANGE UP
WBC # BLD: 8.42 K/UL — SIGNIFICANT CHANGE UP (ref 4.8–10.8)
WBC # FLD AUTO: 8.42 K/UL — SIGNIFICANT CHANGE UP (ref 4.8–10.8)
WBC UR QL: SIGNIFICANT CHANGE UP /HPF (ref 0–5)

## 2019-11-20 PROCEDURE — 93010 ELECTROCARDIOGRAM REPORT: CPT

## 2019-11-20 PROCEDURE — 71046 X-RAY EXAM CHEST 2 VIEWS: CPT | Mod: 26

## 2019-11-20 RX ORDER — METFORMIN HYDROCHLORIDE 850 MG/1
1 TABLET ORAL
Qty: 0 | Refills: 0 | DISCHARGE

## 2019-11-20 NOTE — H&P PST ADULT - HISTORY OF PRESENT ILLNESS
55 y/o female scheduled for tahbso reports she was recently dx with uterine cancer  reports no c/o cp,sob,palpitations,cough or dysuria  1-2 fos without sob

## 2019-11-21 ENCOUNTER — OUTPATIENT (OUTPATIENT)
Dept: OUTPATIENT SERVICES | Facility: HOSPITAL | Age: 54
LOS: 1 days | Discharge: HOME | End: 2019-11-21

## 2019-11-21 ENCOUNTER — APPOINTMENT (OUTPATIENT)
Dept: INTERNAL MEDICINE | Facility: CLINIC | Age: 54
End: 2019-11-21
Payer: COMMERCIAL

## 2019-11-21 VITALS
TEMPERATURE: 97.1 F | WEIGHT: 144 LBS | HEART RATE: 83 BPM | BODY MASS INDEX: 23.99 KG/M2 | HEIGHT: 65 IN | SYSTOLIC BLOOD PRESSURE: 106 MMHG | DIASTOLIC BLOOD PRESSURE: 68 MMHG

## 2019-11-21 DIAGNOSIS — C54.1 MALIGNANT NEOPLASM OF ENDOMETRIUM: ICD-10-CM

## 2019-11-21 DIAGNOSIS — Z00.00 ENCOUNTER FOR GENERAL ADULT MEDICAL EXAMINATION W/OUT ABNORMAL FINDINGS: ICD-10-CM

## 2019-11-21 PROBLEM — C55 MALIGNANT NEOPLASM OF UTERUS, PART UNSPECIFIED: Chronic | Status: ACTIVE | Noted: 2019-11-20

## 2019-11-21 PROBLEM — E11.9 TYPE 2 DIABETES MELLITUS WITHOUT COMPLICATIONS: Chronic | Status: ACTIVE | Noted: 2019-11-20

## 2019-11-21 PROCEDURE — 99214 OFFICE O/P EST MOD 30 MIN: CPT

## 2019-11-21 NOTE — HISTORY OF PRESENT ILLNESS
[Family Member] : family member [[employer] ] : provided by [employer]   [FreeTextEntry1] : [id number] [de-identified] : 53 yo F with pmhx DLD, DM, new dx adenocarcinoma of the uterus presents for pre-operative clearance for ALESHA with BSO on 11/25/19. She states that she feels weak and down in addition to pelvic pain. The pain has been steady and has not gotten better or worse. She states she has a fever and chills but they are light. Denied SOB, cp. Admits to a little nausea but no vomiting. Denied blood stools, constipation. She has a lot of bleeding from the vagina in addition to discharge. she has not lost weight.

## 2019-11-21 NOTE — ASSESSMENT
[FreeTextEntry1] : 53 yo F with pmhx DLD, DM, new dx adenocarcinoma of the uterus presents for pre-operative clearance for ALESHA with BSO on 11/25/19. \par \par # pre-operative clearance for ALESHA and BSO because of endometrial carcinoma. \par -scheduled 11/25/19 with gyn\par -labs reviewed. Hb 9.4 and Hct 31 plt 437\par -moderate risk for relatively high risk procedure. cleared for surgery 11/25/19\par \par # DM.\par -c/w metformin. Pt has not been taking metformin; was following with doctor in [place]. \par -HbA1c 12.2 9/2018 and 9.9 10/2018. Currently 7.0 11/20/19\par -pt states she still has metformin\par -optho after procedure\par -urine microalbumin\par \par \par # Endometrial thickening on US and CT abd and pelvis\par - found to be adenocarcinoma on path\par -scheduled for ALESHA and BSO 11/25/19\par \par # HCM\par - flu vaccine UTD(given 9/2019 at [facility] center per patient)\par - mammogram (Bi- rad: 1) in 10/2018. due now. will send referral\par - pap smear UTD\par - colonoscopy scheduled after sx\par

## 2019-11-22 ENCOUNTER — OUTPATIENT (OUTPATIENT)
Dept: OUTPATIENT SERVICES | Facility: HOSPITAL | Age: 54
LOS: 1 days | Discharge: HOME | End: 2019-11-22
Payer: COMMERCIAL

## 2019-11-22 ENCOUNTER — INPATIENT (INPATIENT)
Facility: HOSPITAL | Age: 54
LOS: 2 days | Discharge: HOME | End: 2019-11-25
Attending: OBSTETRICS & GYNECOLOGY | Admitting: OBSTETRICS & GYNECOLOGY
Payer: MEDICAID

## 2019-11-22 VITALS
TEMPERATURE: 97 F | RESPIRATION RATE: 20 BRPM | HEART RATE: 76 BPM | SYSTOLIC BLOOD PRESSURE: 132 MMHG | DIASTOLIC BLOOD PRESSURE: 70 MMHG | OXYGEN SATURATION: 100 %

## 2019-11-22 DIAGNOSIS — C55 MALIGNANT NEOPLASM OF UTERUS, PART UNSPECIFIED: ICD-10-CM

## 2019-11-22 LAB
25(OH)D3 SERPL-MCNC: 18 NG/ML
ALBUMIN SERPL ELPH-MCNC: 3.5 G/DL — SIGNIFICANT CHANGE UP (ref 3.5–5.2)
ALP SERPL-CCNC: 54 U/L — SIGNIFICANT CHANGE UP (ref 30–115)
ALT FLD-CCNC: 9 U/L — SIGNIFICANT CHANGE UP (ref 0–41)
ANION GAP SERPL CALC-SCNC: 14 MMOL/L — SIGNIFICANT CHANGE UP (ref 7–14)
AST SERPL-CCNC: 17 U/L — SIGNIFICANT CHANGE UP (ref 0–41)
BASOPHILS # BLD AUTO: 0.1 K/UL — SIGNIFICANT CHANGE UP (ref 0–0.2)
BASOPHILS # BLD AUTO: 0.13 K/UL
BASOPHILS NFR BLD AUTO: 1.2 % — HIGH (ref 0–1)
BASOPHILS NFR BLD AUTO: 1.5 %
BILIRUB SERPL-MCNC: 0.3 MG/DL — SIGNIFICANT CHANGE UP (ref 0.2–1.2)
BUN SERPL-MCNC: 10 MG/DL — SIGNIFICANT CHANGE UP (ref 10–20)
CALCIUM SERPL-MCNC: 9 MG/DL — SIGNIFICANT CHANGE UP (ref 8.5–10.1)
CHLORIDE SERPL-SCNC: 101 MMOL/L — SIGNIFICANT CHANGE UP (ref 98–110)
CHOLEST SERPL-MCNC: 184 MG/DL
CHOLEST/HDLC SERPL: 4.6 RATIO
CO2 SERPL-SCNC: 20 MMOL/L — SIGNIFICANT CHANGE UP (ref 17–32)
CREAT SERPL-MCNC: 0.5 MG/DL — LOW (ref 0.7–1.5)
CREAT SPEC-SCNC: 151 MG/DL
EOSINOPHIL # BLD AUTO: 0.28 K/UL — SIGNIFICANT CHANGE UP (ref 0–0.7)
EOSINOPHIL # BLD AUTO: 0.36 K/UL
EOSINOPHIL NFR BLD AUTO: 3.2 % — SIGNIFICANT CHANGE UP (ref 0–8)
EOSINOPHIL NFR BLD AUTO: 4.3 %
GLUCOSE BLDC GLUCOMTR-MCNC: 117 MG/DL — HIGH (ref 70–99)
GLUCOSE BLDC GLUCOMTR-MCNC: 134 MG/DL — HIGH (ref 70–99)
GLUCOSE SERPL-MCNC: 101 MG/DL — HIGH (ref 70–99)
HCT VFR BLD CALC: 31.9 % — LOW (ref 37–47)
HCT VFR BLD CALC: 32.1 %
HDLC SERPL-MCNC: 40 MG/DL
HGB BLD-MCNC: 9.4 G/DL
HGB BLD-MCNC: 9.8 G/DL — LOW (ref 12–16)
IMM GRANULOCYTES NFR BLD AUTO: 0.3 % — SIGNIFICANT CHANGE UP (ref 0.1–0.3)
IMM GRANULOCYTES NFR BLD AUTO: 0.4 %
LACTATE SERPL-SCNC: 0.8 MMOL/L — SIGNIFICANT CHANGE UP (ref 0.5–2.2)
LDLC SERPL CALC-MCNC: 120 MG/DL
LIDOCAIN IGE QN: 51 U/L — SIGNIFICANT CHANGE UP (ref 7–60)
LYMPHOCYTES # BLD AUTO: 1.96 K/UL — SIGNIFICANT CHANGE UP (ref 1.2–3.4)
LYMPHOCYTES # BLD AUTO: 2.41 K/UL
LYMPHOCYTES # BLD AUTO: 22.7 % — SIGNIFICANT CHANGE UP (ref 20.5–51.1)
LYMPHOCYTES NFR BLD AUTO: 28.6 %
MAGNESIUM SERPL-MCNC: 2.4 MG/DL
MAN DIFF?: NORMAL
MCHC RBC-ENTMCNC: 21.8 PG
MCHC RBC-ENTMCNC: 22.2 PG — LOW (ref 27–31)
MCHC RBC-ENTMCNC: 29.3 G/DL
MCHC RBC-ENTMCNC: 30.7 G/DL — LOW (ref 32–37)
MCV RBC AUTO: 72.2 FL — LOW (ref 81–99)
MCV RBC AUTO: 74.5 FL
MICROALBUMIN 24H UR DL<=1MG/L-MCNC: 10 MG/DL
MICROALBUMIN/CREAT 24H UR-RTO: 66 MG/G
MONOCYTES # BLD AUTO: 0.61 K/UL — HIGH (ref 0.1–0.6)
MONOCYTES # BLD AUTO: 0.7 K/UL
MONOCYTES NFR BLD AUTO: 7.1 % — SIGNIFICANT CHANGE UP (ref 1.7–9.3)
MONOCYTES NFR BLD AUTO: 8.3 %
NEUTROPHILS # BLD AUTO: 4.81 K/UL
NEUTROPHILS # BLD AUTO: 5.66 K/UL — SIGNIFICANT CHANGE UP (ref 1.4–6.5)
NEUTROPHILS NFR BLD AUTO: 56.9 %
NEUTROPHILS NFR BLD AUTO: 65.5 % — SIGNIFICANT CHANGE UP (ref 42.2–75.2)
NRBC # BLD: 0 /100 WBCS — SIGNIFICANT CHANGE UP (ref 0–0)
PLATELET # BLD AUTO: 450 K/UL
PLATELET # BLD AUTO: 455 K/UL — HIGH (ref 130–400)
POTASSIUM SERPL-MCNC: 4.5 MMOL/L — SIGNIFICANT CHANGE UP (ref 3.5–5)
POTASSIUM SERPL-SCNC: 4.5 MMOL/L — SIGNIFICANT CHANGE UP (ref 3.5–5)
PROT SERPL-MCNC: 7.8 G/DL — SIGNIFICANT CHANGE UP (ref 6–8)
RBC # BLD: 4.31 M/UL
RBC # BLD: 4.42 M/UL — SIGNIFICANT CHANGE UP (ref 4.2–5.4)
RBC # FLD: 17.8 %
RBC # FLD: 17.9 % — HIGH (ref 11.5–14.5)
SODIUM SERPL-SCNC: 135 MMOL/L — SIGNIFICANT CHANGE UP (ref 135–146)
TRIGL SERPL-MCNC: 153 MG/DL
TSH SERPL-ACNC: 1.4 UIU/ML
WBC # BLD: 8.64 K/UL — SIGNIFICANT CHANGE UP (ref 4.8–10.8)
WBC # FLD AUTO: 8.44 K/UL
WBC # FLD AUTO: 8.64 K/UL — SIGNIFICANT CHANGE UP (ref 4.8–10.8)

## 2019-11-22 PROCEDURE — 71260 CT THORAX DX C+: CPT | Mod: 26

## 2019-11-22 PROCEDURE — 99285 EMERGENCY DEPT VISIT HI MDM: CPT

## 2019-11-22 RX ORDER — METFORMIN HYDROCHLORIDE 850 MG/1
500 TABLET ORAL
Refills: 0 | Status: DISCONTINUED | OUTPATIENT
Start: 2019-11-22 | End: 2019-11-24

## 2019-11-22 RX ORDER — MORPHINE SULFATE 50 MG/1
4 CAPSULE, EXTENDED RELEASE ORAL ONCE
Refills: 0 | Status: DISCONTINUED | OUTPATIENT
Start: 2019-11-22 | End: 2019-11-22

## 2019-11-22 RX ORDER — KETOROLAC TROMETHAMINE 30 MG/ML
30 SYRINGE (ML) INJECTION ONCE
Refills: 0 | Status: DISCONTINUED | OUTPATIENT
Start: 2019-11-22 | End: 2019-11-22

## 2019-11-22 RX ORDER — SODIUM CHLORIDE 9 MG/ML
1000 INJECTION INTRAMUSCULAR; INTRAVENOUS; SUBCUTANEOUS ONCE
Refills: 0 | Status: COMPLETED | OUTPATIENT
Start: 2019-11-22 | End: 2019-11-22

## 2019-11-22 RX ADMIN — METFORMIN HYDROCHLORIDE 500 MILLIGRAM(S): 850 TABLET ORAL at 17:00

## 2019-11-22 RX ADMIN — SODIUM CHLORIDE 1000 MILLILITER(S): 9 INJECTION INTRAMUSCULAR; INTRAVENOUS; SUBCUTANEOUS at 09:50

## 2019-11-22 RX ADMIN — Medication 30 MILLIGRAM(S): at 09:50

## 2019-11-22 NOTE — ED PROVIDER NOTE - OBJECTIVE STATEMENT
53yo female with recently diagnosed endometrial cancer, sent from gyn onc Dr. Campuzano for MRI. Patient is scheduled for [facility] BSO on 11/25, needs MRI prior to procedure and was unable to get an appointment outpatient. Currently patient reports chronic, diffuse abdominal pain, has not taken any pain medication today. Denies fevers, chills, CP, SOB, nausea, vomiting, or vaginal bleeding.

## 2019-11-22 NOTE — H&P ADULT - NSICDXFAMILYHX_GEN_ALL_CORE_FT
FAMILY HISTORY:  Father  Still living? Unknown  FH: diabetes mellitus, Age at diagnosis: Age Unknown

## 2019-11-22 NOTE — ED ADULT NURSE NOTE - NSIMPLEMENTINTERV_GEN_ALL_ED
Implemented All Fall with Harm Risk Interventions:  [place] to call system. Call bell, personal items and telephone within reach. Instruct patient to call for assistance. Room bathroom lighting operational. Non-slip footwear when patient is off stretcher. Physically safe environment: no spills, clutter or unnecessary equipment. Stretcher in lowest position, wheels locked, appropriate side rails in place. Provide visual cue, wrist band, yellow gown, etc. Monitor gait and stability. Monitor for mental status changes and reorient to person, place, and time. Review medications for side effects contributing to fall risk. Reinforce activity limits and safety measures with patient and family. Provide visual clues: red socks.

## 2019-11-22 NOTE — ED PROVIDER NOTE - CLINICAL SUMMARY MEDICAL DECISION MAKING FREE TEXT BOX
pt sent in for admission for  inpt MRI by her ob gyn, ob gyn evaluated pt, pt comfortable at this time, understands plan for admission, family at bedside also aware, admission to ob gyn as discussed with team.

## 2019-11-22 NOTE — H&P ADULT - NSHPPHYSICALEXAM_GEN_ALL_CORE
Vital Signs Last 24 Hrs  T(C): 35.9 (22 Nov 2019 08:14), Max: 35.9 (22 Nov 2019 08:14)  T(F): 96.7 (22 Nov 2019 08:14), Max: 96.7 (22 Nov 2019 08:14)  HR: 76 (22 Nov 2019 08:14) (76 - 76)  BP: 132/70 (22 Nov 2019 08:14) (132/70 - 132/70)  BP(mean): --  RR: 20 (22 Nov 2019 08:14) (20 - 20)  SpO2: 100% (22 Nov 2019 08:14) (100% - 100%)    Gen: NAD  Abd: soft, mildly tender, no r/g/r  Ext: no edema, no calf tenderness  VE: deferred

## 2019-11-22 NOTE — ED PROVIDER NOTE - PHYSICAL EXAMINATION
PHYSICAL EXAM  GENERAL: No acute distress, well-developed  HEAD:  Atraumatic, Normocephalic  ENT: PERRL, conjunctiva and sclera clear, neck supple, no JVD, moist mucosa, posterior oropharynx clear  CHEST/LUNG: Clear to auscultation bilaterally; No wheeze, equal breath sounds bilaterally, respirations nonlabored  HEART: Regular rate and rhythm; No murmurs, rubs, or gallops  ABDOMEN: Diffuse abdominal tenderness to palpation. Soft, nondistended; Bowel sounds present, no organomegaly  BACK: no spinal tenderness, no CVA tenderness  EXTREMITIES:  No clubbing, cyanosis, or edema  PSYCH: Nl behavior, nl affect  NEUROLOGY: AAOx3, non-focal, moves all extremities spontaneously  SKIN: Normal color, No rashes or lesions

## 2019-11-22 NOTE — H&P ADULT - HISTORY OF PRESENT ILLNESS
53 yo with postmenopausal bleeding and pelvic pain. Pt went to ED for symptoms 10/2019 was no evaluated by gyn but was found to have ES 2.9cm, tissue mass in cervical canal, enlarged uterus, and lymphadenopathy. Pt f/u outpt with gyn who performed EMBx that showed endometrial cancer.  Pt scheduled for ALESHA, BSO, staging on 11/25.  Pt had outpatient clearance    Last Pap Smear: 10/2018 NILM  Last mammogram:10/2018 BIRADS1, dense breasts  Last colonoscopy: never

## 2019-11-22 NOTE — H&P ADULT - ASSESSMENT
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

## 2019-11-22 NOTE — ED ADULT NURSE NOTE - OBJECTIVE STATEMENT
Pt was sent by doctor for MRI needed prior to surgery for endometrial CA. Pt c/o abdominal pain. Denies chest pain, SOB, or any other symptoms. Pt reports she had a CT with contrast scan this morning.

## 2019-11-22 NOTE — ED PROVIDER NOTE - PROGRESS NOTE DETAILS
ATTENDING NOTE: I personally evaluated the patient. I reviewed the Resident’s note (as assigned above), and agree with the findings and plan except as documented in my note.   53 y/o F with PMH of DM on Metformin, recent diagnosis of uterine CA 1 week ago s/p biopsy, scheduled for hysterectomy Monday, 11/25 (OBGYN Dr. Campuzano) sent to ED by OBGYN who called her this AM for inpatient MRI as she was unable to get one scheduled as outpatient before hysterectomy. Had CT- chest today to assess for any metastasis. Was seen in ED in October for vaginal bleeding which has subsided; CT- abd/pelvis done then. Pt additionally notes LLQ abdominal pain which is chronic.No fever, chills, n/v, cp, sob, pleuritic cp, palpitations, diaphoresis, cough, ha/lh/dizziness, numbness/tingling, neck pain/ stiffness, diarrhea, constipation, melena/brbpr, urinary symptoms, trauma, weakness, edema, calf pain/swelling/erythema, sick contacts, recent travel or rash.   Vital Signs: I have reviewed the initial vital signs. Constitutional: WDWN in nad. Sitting on stretcher in nad. Integumentary: No rash. ENT: MMM NECK: Supple, non-tender, no meningeal signs. Cardiovascular: RRR, radial pulses 2/4 b/l. No JVD. Respiratory: BS present b/l, ctabl, no wheezing or crackles, good resp effort and excursion, good air exchange,  no accessory muscle use, no stridor. Gastrointestinal: (+) TTP over lower-mid absomen, BS present throughout all 4 quadrants, soft, nd, no rebound tenderness or guarding, no cvat. Musculoskeletal: FROM, no edema, no calf pain/swelling/erythema. Neurologic: AAOx3, motor 5/5 and sensation intact throughout UE and LE ext, CN II-XII intact, No facial droop or slurring of speech. No focal deficits.  Plan for labs, IVF, pain control, discussion with OBGYN and admit for inpatient MRI. ATTENDING NOTE: I personally evaluated the patient. I reviewed the Resident’s note (as assigned above), and agree with the findings and plan except as documented in my note.   55 y/o F with PMH of DM on Metformin, recent diagnosis of uterine CA 1 week ago s/p biopsy, scheduled for hysterectomy Monday, 11/25 (OBGYN Dr. Campuzano) sent to ED by OBGYN who called her this AM for inpatient MRI as she was unable to get one scheduled as outpatient before hysterectomy. Had CT- chest today to assess for any metastasis. Was seen in ED in October for vaginal bleeding which has subsided; CT- abd/pelvis done then.  No fever, chills, n/v, cp, sob, pleuritic cp, palpitations, diaphoresis, cough, ha/lh/dizziness, numbness/tingling, neck pain/ stiffness, diarrhea, constipation, melena/brbpr, urinary symptoms, trauma, weakness, edema, calf pain/swelling/erythema, sick contacts, recent travel or rash.   Vital Signs: I have reviewed the initial vital signs. Constitutional: Female pt sitting on stretcher in nad. Integumentary: No rash. ENT: MMM NECK: Supple, non-tender, no meningeal signs. Cardiovascular: RRR, radial pulses 2/4 b/l. No JVD. Respiratory: BS present b/l, ctabl, no wheezing or crackles, good resp effort and excursion, good air exchange,  no accessory muscle use, no stridor. Gastrointestinal: (+) TTP over lower-mid abdomen, BS present throughout all 4 quadrants, soft, nd, no rebound tenderness or guarding, no cvat. Musculoskeletal: FROM, no edema, no calf pain/swelling/erythema. Neurologic: AAOx3, motor 5/5 and sensation intact throughout UE and LE ext, CN II-XII intact, No facial droop or slurring of speech. No focal deficits.  Plan for labs, IVF, pain control, discussion with OBGYN and admit for inpatient MRI. ob gyn evaluated pt report admission to their service pt and family aware and agree.

## 2019-11-23 LAB
BLD GP AB SCN SERPL QL: SIGNIFICANT CHANGE UP
GLUCOSE BLDC GLUCOMTR-MCNC: 102 MG/DL — HIGH (ref 70–99)
GLUCOSE BLDC GLUCOMTR-MCNC: 183 MG/DL — HIGH (ref 70–99)
GLUCOSE BLDC GLUCOMTR-MCNC: 81 MG/DL — SIGNIFICANT CHANGE UP (ref 70–99)
HCT VFR BLD CALC: 30.3 % — LOW (ref 37–47)
HGB BLD-MCNC: 8.9 G/DL — LOW (ref 12–16)
MCHC RBC-ENTMCNC: 21.7 PG — LOW (ref 27–31)
MCHC RBC-ENTMCNC: 29.4 G/DL — LOW (ref 32–37)
MCV RBC AUTO: 73.7 FL — LOW (ref 81–99)
NRBC # BLD: 0 /100 WBCS — SIGNIFICANT CHANGE UP (ref 0–0)
PLATELET # BLD AUTO: 422 K/UL — HIGH (ref 130–400)
RBC # BLD: 4.11 M/UL — LOW (ref 4.2–5.4)
RBC # FLD: 17.8 % — HIGH (ref 11.5–14.5)
WBC # BLD: 7.91 K/UL — SIGNIFICANT CHANGE UP (ref 4.8–10.8)
WBC # FLD AUTO: 7.91 K/UL — SIGNIFICANT CHANGE UP (ref 4.8–10.8)

## 2019-11-23 PROCEDURE — 72196 MRI PELVIS W/DYE: CPT | Mod: 26

## 2019-11-23 RX ADMIN — METFORMIN HYDROCHLORIDE 500 MILLIGRAM(S): 850 TABLET ORAL at 06:03

## 2019-11-23 RX ADMIN — METFORMIN HYDROCHLORIDE 500 MILLIGRAM(S): 850 TABLET ORAL at 17:40

## 2019-11-23 NOTE — PROGRESS NOTE ADULT - ASSESSMENT
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   - completed PST  - received medical clearance from PMD (Dr Burgos)  - CT chest done outpt  - MRI abd/pelvis with IV contrast ordered, will be done today per radiology  - c/w home meds (metformin), toradol    Will inform Dr. Campuzano.

## 2019-11-24 LAB
GLUCOSE BLDC GLUCOMTR-MCNC: 100 MG/DL — HIGH (ref 70–99)
GLUCOSE BLDC GLUCOMTR-MCNC: 108 MG/DL — HIGH (ref 70–99)
GLUCOSE BLDC GLUCOMTR-MCNC: 147 MG/DL — HIGH (ref 70–99)
HCV AB S/CO SERPL IA: 0.17 S/CO — SIGNIFICANT CHANGE UP (ref 0–0.99)
HCV AB SERPL-IMP: SIGNIFICANT CHANGE UP

## 2019-11-24 RX ORDER — SODIUM CHLORIDE 9 MG/ML
1000 INJECTION, SOLUTION INTRAVENOUS
Refills: 0 | Status: DISCONTINUED | OUTPATIENT
Start: 2019-11-25 | End: 2019-11-25

## 2019-11-24 RX ADMIN — METFORMIN HYDROCHLORIDE 500 MILLIGRAM(S): 850 TABLET ORAL at 06:27

## 2019-11-24 RX ADMIN — METFORMIN HYDROCHLORIDE 500 MILLIGRAM(S): 850 TABLET ORAL at 17:00

## 2019-11-24 NOTE — PROGRESS NOTE ADULT - SUBJECTIVE AND OBJECTIVE BOX
PGY3 progress note    Patient seen and examined at bedside, resting comfortably. No complaints. Denies headache, fever, dizziness, n/v, chest pain, SOB, abdominal pain, dysuria, diarrhea.   Voiding, ambulating, tolerating regular diet     Vital Signs Last 24 Hrs  T(C): 35.7 (24 Nov 2019 04:11), Max: 36.4 (23 Nov 2019 15:53)  T(F): 96.3 (24 Nov 2019 04:11), Max: 97.6 (23 Nov 2019 15:53)  HR: 78 (24 Nov 2019 04:11) (73 - 80)  BP: 114/72 (24 Nov 2019 04:11) (103/54 - 122/58)  RR: 18 (24 Nov 2019 04:11) (16 - 18)    GEN: NAD, AAOX3  CVS: RRR, normal S1/S2  lungs: CTAB  abd: soft, nontender, nondistended, no r/g/r  ext: no calf tenderness or edema    Labs: hepC neg    Imaging:     Chest CT Impression: 1. Interval development peritoneal stranding and trace ascites within the upper abdominal compartments. This appearance may be seen in patients with   peritoneal carcinomatosis. 2. No current CT evidence of intrathoracic adenopathy, mass or effusions. 3. Scattered emphysematous changes more pronounced within the right   midlung.. 4. Basilar groundglass opacities without honeycombing or bronchiectasis 5. Reticular densities lingula segment likely discoid atelectasis.

## 2019-11-24 NOTE — PROGRESS NOTE ADULT - ASSESSMENT
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   - completed PST  - received medical clearance from PMD (Dr Burgos)  - MRI abd/pelvis with IV contrast done, read pending   - c/w home meds (metformin), toradol    Will inform Dr. Campuzano

## 2019-11-25 ENCOUNTER — TRANSCRIPTION ENCOUNTER (OUTPATIENT)
Age: 54
End: 2019-11-25

## 2019-11-25 VITALS
DIASTOLIC BLOOD PRESSURE: 55 MMHG | TEMPERATURE: 99 F | RESPIRATION RATE: 18 BRPM | HEART RATE: 70 BPM | SYSTOLIC BLOOD PRESSURE: 113 MMHG

## 2019-11-25 LAB
GLUCOSE BLDC GLUCOMTR-MCNC: 128 MG/DL — HIGH (ref 70–99)
HCG SERPL-ACNC: 3.6 MIU/ML — SIGNIFICANT CHANGE UP

## 2019-11-25 RX ORDER — BENZOYL PEROXIDE MICRONIZED 5.8 %
1 TOWELETTE (EA) TOPICAL
Qty: 0 | Refills: 0 | DISCHARGE

## 2019-11-25 RX ORDER — IBUPROFEN 200 MG
1 TABLET ORAL
Qty: 0 | Refills: 0 | DISCHARGE

## 2019-11-25 RX ORDER — METFORMIN HYDROCHLORIDE 850 MG/1
500 TABLET ORAL
Refills: 0 | Status: DISCONTINUED | OUTPATIENT
Start: 2019-11-25 | End: 2019-11-25

## 2019-11-25 RX ADMIN — METFORMIN HYDROCHLORIDE 500 MILLIGRAM(S): 850 TABLET ORAL at 18:19

## 2019-11-25 NOTE — DISCHARGE NOTE PROVIDER - HOSPITAL COURSE
55 yo recently diagnosed with endometrial cancer by endometrial biopsy,     MRI and CT scan show extensive disease, unable to resect surgically and this time.    Pt cleared by medicine.    Will have neoadjuvant chemo, radiation if bleeding, then repeat PET scan after 3-4 cycles and determine possibility for surgical resection.    Appts with med/onc and rad/onc to be made for patient and will contact patient with date and time.

## 2019-11-25 NOTE — DISCHARGE NOTE PROVIDER - NSDCFUADDAPPT_GEN_ALL_CORE_FT
Dr. Lowell Willard(medical oncology)  [address]   [phone]    Dr. Yue Perez(radiation oncology)  [address]  [phone]

## 2019-11-25 NOTE — PROGRESS NOTE ADULT - SUBJECTIVE AND OBJECTIVE BOX
PGY 4 note  (H[place]ian) # [id number]    Patient seen at bedside to discuss results of MRI.  Images reviewed, determined that uterine Ca is too extensive for surgical resection due to pelvic side wall involvement, liver lesions, carcinomatosis.   Patient would benefit from neoadjuvant chemotherapy and repeat imaging after 3-4 cycles to determine if disease can be surgically resected.   Patient is jehovah witness and will not accept blood products, therefore surgery at this time is also not an option due to high risk of bleeding.  Patient made aware that if she has heavy vaginal bleeding, during chemotherapy she should get radiation.  Therefore, the final recommendation is neoadjuvant chemotherapy, radiation if needed for bleeding, repeat imaging after 3-4 cycles to determine if she is a surgical candidate.    Patient and family made aware of recommendation.  All questions answered.  Will make appointments for patient with med/onc and rad/onc and patient can follow up as soon as possible.  Made is clear to patient and family that she should not have a delay in treatment and appointments will be made as soon as possible.    Dr. Campuzano at bedside.

## 2019-11-25 NOTE — PROGRESS NOTE ADULT - SUBJECTIVE AND OBJECTIVE BOX
PGY 3 Note    Patient examined at bedside, no weekend events.  Occasional mild abdominal pain relieved with tylenol.  Denies fevers/chills, HA/N/V, CP/SOB/palpitations, vaginal bleeding, hematuria/dysuria, constipation/diarrhea. Tolerating regular diet, NPO since midnight.     T(F): 97 (11-25-19 @ 00:00), Max: 98.1 (11-24-19 @ 16:01)  HR: 75 (11-25-19 @ 00:00) (74 - 75)  BP: 113/56 (11-25-19 @ 00:00) (106/56 - 115/75)  RR: 18 (11-25-19 @ 00:00) (18 - 18)  SpO2: --    I&O's Summary    24 Nov 2019 07:01  -  25 Nov 2019 06:27  --------------------------------------------------------  IN: 480 mL / OUT: 0 mL / NET: 480 mL      CAPILLARY BLOOD GLUCOSE      POCT Blood Glucose.: 147 mg/dL (24 Nov 2019 21:48)  POCT Blood Glucose.: 100 mg/dL (24 Nov 2019 16:43)  POCT Blood Glucose.: 108 mg/dL (24 Nov 2019 07:44)      Physical Exam:  General: AAOx3. NAD  CVS: RRR. Nl S1S2  Lungs: CTAB  Abdomen: soft, non-tender, non-distended, +BSx4  Incision: C/D/I, no erythema, no draining  VE: deferred, no bleeding on pad/chux  Ext: No edema. SCDs in place    Labs:             8.9<L>  7.91  )-----------( 422<H>    ( 11-23 @ 05:40 )             30.3<L>               9.8<L>  8.64  )-----------( 455<H>    ( 11-22 @ 08:36 )             31.9<L>               9.4<L>  8.42  )-----------( 437<H>    ( 11-20 @ 07:49 )             31.0<L>                   Trend:             8.9<L>  7.91  )-----------( 422<H>    ( 11-23 @ 05:40 )             30.3<L>               9.8<L>  8.64  )-----------( 455<H>    ( 11-22 @ 08:36 )             31.9<L>               9.4<L>  8.42  )-----------( 437<H>    ( 11-20 @ 07:49 )             31.0<L>        Creatinine, Serum: 0.5 (11-22)  Creatinine, Serum: 0.5 (11-20)      Medications:  MEDICATIONS  (STANDING):  lactated ringers. 1000 milliLiter(s) (125 mL/Hr) IV Continuous <Continuous>    Radiology:    Chest CT Impression: 1. Interval development peritoneal stranding and trace ascites within the upper abdominal compartments. This appearance may be seen in patients with   peritoneal carcinomatosis. 2. No current CT evidence of intrathoracic adenopathy, mass or effusions. 3. Scattered emphysematous changes more pronounced within the right   midlung.. 4. Basilar groundglass opacities without honeycombing or bronchiectasis 5. Reticular densities lingula segment likely discoid atelectasis.     MRI pending read

## 2019-11-25 NOTE — DISCHARGE NOTE PROVIDER - NSDCCPCAREPLAN_GEN_ALL_CORE_FT
PRINCIPAL DISCHARGE DIAGNOSIS  Diagnosis: Uterine cancer  Assessment and Plan of Treatment: for neoadjuvant chemo, not a surgical candidate at this time

## 2019-11-25 NOTE — DISCHARGE NOTE PROVIDER - CARE PROVIDER_API CALL
Ignacio Campuzano)  Gynecologic Oncology; Obstetrics and Gynecology  [address] [place] [address]  Phone: [phone]  Fax: [phone]  Established Patient  Follow Up Time: Routine

## 2019-11-25 NOTE — DISCHARGE NOTE NURSING/CASE MANAGEMENT/SOCIAL WORK - NSDCFUADDAPPT_GEN_ALL_CORE_FT
Dr. Lowell Willard(medical oncology)  [address]   [phone]    Dr. Yue Perez(radiation oncology)  [address]  [phone]

## 2019-11-25 NOTE — PROGRESS NOTE ADULT - ASSESSMENT
A/P:  54y postmenopausal with endometriod adenocarcinoma for surgery today,  -NPO since midnight, IV fluids  -MRI abdomen/pelvis pending read, will decide whether pt has ALESHA, BSO or EUA and diagnostic laparoscopy based on findings  -Jahova's witness, anemic, Hb<9 will need cell saver for open surgery  -received medical clearance from PMD Dr Burgos    Will inform Dr Campuzano

## 2019-11-25 NOTE — DISCHARGE NOTE NURSING/CASE MANAGEMENT/SOCIAL WORK - PATIENT PORTAL LINK FT
You can access the FollowMyHealth Patient Portal offered by [facility] by registering at the following website: [email or web address]. By joining [employer]’s FollowMyHealth portal, you will also be able to view your health information using other applications (apps) compatible with our system.

## 2019-11-26 ENCOUNTER — INBOUND DOCUMENT (OUTPATIENT)
Age: 54
End: 2019-11-26

## 2019-11-27 LAB — CR SERPL-MCNC: 0.8 UG/L — SIGNIFICANT CHANGE UP (ref 0.1–2.1)

## 2019-11-29 DIAGNOSIS — C54.1 MALIGNANT NEOPLASM OF ENDOMETRIUM: ICD-10-CM

## 2019-11-29 DIAGNOSIS — C55 MALIGNANT NEOPLASM OF UTERUS, PART UNSPECIFIED: ICD-10-CM

## 2019-11-29 DIAGNOSIS — C80.0 DISSEMINATED MALIGNANT NEOPLASM, UNSPECIFIED: ICD-10-CM

## 2019-11-29 DIAGNOSIS — E11.9 TYPE 2 DIABETES MELLITUS WITHOUT COMPLICATIONS: ICD-10-CM

## 2019-11-29 DIAGNOSIS — Z78.0 ASYMPTOMATIC MENOPAUSAL STATE: ICD-10-CM

## 2019-11-29 DIAGNOSIS — D64.9 ANEMIA, UNSPECIFIED: ICD-10-CM

## 2019-11-29 DIAGNOSIS — G89.29 OTHER CHRONIC PAIN: ICD-10-CM

## 2019-11-29 DIAGNOSIS — Z79.84 LONG TERM (CURRENT) USE OF ORAL HYPOGLYCEMIC DRUGS: ICD-10-CM

## 2019-11-29 DIAGNOSIS — Z01.818 ENCOUNTER FOR OTHER PREPROCEDURAL EXAMINATION: ICD-10-CM

## 2019-11-29 DIAGNOSIS — K76.9 LIVER DISEASE, UNSPECIFIED: ICD-10-CM

## 2019-12-10 ENCOUNTER — LABORATORY RESULT (OUTPATIENT)
Age: 54
End: 2019-12-10

## 2019-12-11 ENCOUNTER — OUTPATIENT (OUTPATIENT)
Dept: OUTPATIENT SERVICES | Facility: HOSPITAL | Age: 54
LOS: 1 days | Discharge: HOME | End: 2019-12-11

## 2019-12-11 DIAGNOSIS — C18.9 MALIGNANT NEOPLASM OF COLON, UNSPECIFIED: ICD-10-CM

## 2019-12-13 ENCOUNTER — APPOINTMENT (OUTPATIENT)
Dept: RADIATION ONCOLOGY | Facility: HOSPITAL | Age: 54
End: 2019-12-13
Payer: MEDICAID

## 2019-12-13 ENCOUNTER — OUTPATIENT (OUTPATIENT)
Dept: OUTPATIENT SERVICES | Facility: HOSPITAL | Age: 54
LOS: 1 days | Discharge: HOME | End: 2019-12-13

## 2019-12-13 VITALS
TEMPERATURE: 96.5 F | RESPIRATION RATE: 18 BRPM | SYSTOLIC BLOOD PRESSURE: 125 MMHG | HEIGHT: 62 IN | DIASTOLIC BLOOD PRESSURE: 59 MMHG | HEART RATE: 78 BPM | BODY MASS INDEX: 25.81 KG/M2 | WEIGHT: 140.25 LBS

## 2019-12-13 PROCEDURE — 99244 OFF/OP CNSLTJ NEW/EST MOD 40: CPT

## 2019-12-13 NOTE — REVIEW OF SYSTEMS
[Fatigue] : fatigue [Recent Change In Weight] : ~T recent weight change [Constipation] : constipation [Anxiety] : anxiety [Depression] : depression [Negative] : Allergic/Immunologic [Anal Pain: Grade 0] : Anal Pain: Grade 0 [Dyspepsia: Grade 0] : Dyspepsia: Grade 0 [Diarrhea: Grade 0] : Diarrhea: Grade 0 [Constipation: Grade 0] : Constipation: Grade 0 [Dysphagia: Grade 0] : Dysphagia: Grade 0 [Esophagitis: Grade 0] : Esophagitis: Grade 0 [Gastroparesis: Grade 0] : Gastroparesis: Grade 0 [Fecal Incontinence: Grade 0] : Fecal Incontinence: Grade 0 [Proctitis: Grade 0] : Proctitis: Grade 0 [Nausea: Grade 0] : Nausea: Grade 0 [Small Intestinal Obstruction: Grade 0] : Small Intestinal Obstruction: Grade 0 [Rectal Pain: Grade 0] : Rectal Pain: Grade 0 [Vomiting: Grade 0] : Vomiting: Grade 0 [Vaginal Discharge] : vaginal discharge [Patient Intake Form Reviewed] : Patient intake form was reviewed [FreeTextEntry4] : vertigo [FreeTextEntry5] : pain to right leg, cold hands and feet [FreeTextEntry2] : lost 18 lbs, weakness [FreeTextEntry7] : jaundice, bloating [FreeTextEntry8] : urgency, past vaginal bleeding none now [de-identified] : change in sleep [de-identified] : dry skin [de-identified] : anemia

## 2019-12-13 NOTE — OB/GYN HISTORY
[Menopause Age: ____] : patient was [unfilled] years old at menopause [Currently In Menopause] : currently in menopause [___] : Full Term: [unfilled]

## 2019-12-13 NOTE — DISEASE MANAGEMENT
[Clinical] : TNM Stage: c [FreeTextEntry4] : Endometrioid carcinoma, FIGO Stage IVb Grade 2 [TTNM] : x [MTNM] : 1 [NTNM] : 1 [IVB] : IVB

## 2019-12-13 NOTE — REASON FOR VISIT
[Other: _________] : [unfilled] [Endometrial Cancer] : endometrial cancer [FreeTextEntry1] : [id number] [FreeTextEntry2] : theresa [FreeTextEntry3] : pt wants saleem oliver friend to translate [TWNoteComboBox1] : Niuean

## 2019-12-13 NOTE — PHYSICAL EXAM
[Heart Rate And Rhythm] : heart rate and rhythm were normal [Heart Sounds] : normal S1 and S2 [Normal] : no focal deficits [de-identified] : There is tumor coming right up to the vaginal introitus.  Bleeds only on contact.  Unable to insert speculum due to patient discomfort.

## 2019-12-13 NOTE — LETTER CLOSING
[Consult Closing:] : Thank you for allowing me to participate in the care of this patient.  If you have any questions, please do not hesitate to contact me. [Sincerely yours,] : Sincerely yours, [FreeTextEntry3] : Yue Perez M.D. \par \par Electronically proofread and signed by:  Yue Perez MD\par Attending, Department of Radiation Medicine\par [facility]\par \par CC: Dr. Willard

## 2019-12-13 NOTE — HISTORY OF PRESENT ILLNESS
[FreeTextEntry1] : The patient is a 54year old woman who presented with post menopausal bleeding and pelvic pain.\par Upon further work up, US showed:  uterus 14.9x10x8.3cm, ES 2.9cm, in JANE , likely in cervical canal there is soft tissue mass 3.5x2.7x4.4cm, R ovary 3x2.2x2.4cm, L ovary 3.7x2.3x1.8cm with cyst 1.1x1x0.9cm\par CT abd/pelvis 10/12/19: New right iliac chain lymph node measures at least 1.5x1.4cm. Enlarged 2.2x1.8cm portacaval lymph nodes are noted as well. Interval enlargement of the heterogeneous uterus, with irregular, fluid-filled endometrial canal and areas of myometrial cavitation. A new right lateral exophytic soft tissue component appears new since prior. Mild surrounding reactive fluid. Suspicious for neoplastic process. \par Biopsy on 11/12/19 showed: well to moderately differentiated endometrioid carcinoma with focal squamous differentiation and focal clear cell change, areas of high grade nuclei and prominent mitotic activity\par CT chest 11/22/19 showed: Impression:Since abdominal CT of October 12, 2019.\par 1. Interval development peritoneal stranding and trace ascites within the upper abdominal compartments. This appearance may be seen in patients with peritoneal carcinomatosis.\par 2. No current CT evidence of intrathoracic adenopathy, mass or effusions.\par \par MRI pelvis on 11/23/19 showed: 1. Heterogeneous enlarged uterus with multifocal endometrial masses, one at uterine fundus and one at the lower uterine segment, as described above,consistent with biopsy-proven neoplasm.\par 2. Heterogeneous 2.1 x 1.8 cm left adnexal soft tissue mass, separate from the ovary, suspicious for a peritoneal tumor implants.\par 3. Enlarged pelvic and retroperitoneal lymph nodes.\par FIGO stage IVb\par Planned surgery for 11/25/19 was therefore cancelled.  She is here to discuss radiation. She will see medical oncology on Monday.  She notes some watery vaginal discharge but no mina bleeding.  She has pelvic pain.  \par \par

## 2019-12-16 ENCOUNTER — LABORATORY RESULT (OUTPATIENT)
Age: 54
End: 2019-12-16

## 2019-12-16 ENCOUNTER — APPOINTMENT (OUTPATIENT)
Dept: HEMATOLOGY ONCOLOGY | Facility: CLINIC | Age: 54
End: 2019-12-16
Payer: MEDICAID

## 2019-12-16 VITALS
WEIGHT: 139 LBS | HEART RATE: 84 BPM | BODY MASS INDEX: 25.58 KG/M2 | HEIGHT: 62 IN | DIASTOLIC BLOOD PRESSURE: 57 MMHG | TEMPERATURE: 96.3 F | SYSTOLIC BLOOD PRESSURE: 120 MMHG

## 2019-12-16 DIAGNOSIS — C54.1 MALIGNANT NEOPLASM OF ENDOMETRIUM: ICD-10-CM

## 2019-12-16 DIAGNOSIS — Z80.49 FAMILY HISTORY OF MALIGNANT NEOPLASM OF OTHER GENITAL ORGANS: ICD-10-CM

## 2019-12-16 PROCEDURE — 99245 OFF/OP CONSLTJ NEW/EST HI 55: CPT

## 2019-12-17 DIAGNOSIS — R22.2 LOCALIZED SWELLING, MASS AND LUMP, TRUNK: ICD-10-CM

## 2019-12-17 LAB
CANCER AG125 SERPL-ACNC: 50 U/ML
FERRITIN SERPL-MCNC: 33 NG/ML
HCT VFR BLD CALC: 31.5 %
HGB BLD-MCNC: 9.6 G/DL
IRON SATN MFR SERPL: 5 %
IRON SERPL-MCNC: 16 UG/DL
MCHC RBC-ENTMCNC: 21.8 PG
MCHC RBC-ENTMCNC: 30.5 G/DL
MCV RBC AUTO: 71.4 FL
PLATELET # BLD AUTO: 392 K/UL
PMV BLD: 10.1 FL
RBC # BLD: 4.41 M/UL
RBC # FLD: 19.9 %
TIBC SERPL-MCNC: 314 UG/DL
UIBC SERPL-MCNC: 298 UG/DL
WBC # FLD AUTO: 11.26 K/UL

## 2019-12-17 RX ORDER — ERGOCALCIFEROL 1.25 MG/1
1.25 MG CAPSULE, LIQUID FILLED ORAL
Qty: 8 | Refills: 0 | Status: ACTIVE | COMMUNITY
Start: 2019-12-16 | End: 1900-01-01

## 2019-12-20 ENCOUNTER — APPOINTMENT (OUTPATIENT)
Dept: INFUSION THERAPY | Facility: CLINIC | Age: 54
End: 2019-12-20

## 2019-12-20 RX ORDER — ACETAMINOPHEN 500 MG
650 TABLET ORAL ONCE
Refills: 0 | Status: COMPLETED | OUTPATIENT
Start: 2019-12-20 | End: 2019-12-20

## 2019-12-20 RX ORDER — HYDROCORTISONE 20 MG
100 TABLET ORAL ONCE
Refills: 0 | Status: COMPLETED | OUTPATIENT
Start: 2019-12-20 | End: 2019-12-20

## 2019-12-20 RX ORDER — FERUMOXYTOL 510 MG/17ML
510 INJECTION INTRAVENOUS ONCE
Refills: 0 | Status: COMPLETED | OUTPATIENT
Start: 2019-12-20 | End: 2019-12-20

## 2019-12-20 RX ADMIN — Medication 100 MILLIGRAM(S): at 16:21

## 2019-12-20 RX ADMIN — Medication 650 MILLIGRAM(S): at 16:02

## 2019-12-20 RX ADMIN — FERUMOXYTOL 510 MILLIGRAM(S): 510 INJECTION INTRAVENOUS at 16:55

## 2019-12-20 RX ADMIN — Medication 650 MILLIGRAM(S): at 16:03

## 2019-12-20 RX ADMIN — FERUMOXYTOL 468 MILLIGRAM(S): 510 INJECTION INTRAVENOUS at 16:25

## 2019-12-20 RX ADMIN — Medication 100 MILLIGRAM(S): at 16:01

## 2019-12-20 NOTE — REVIEW OF SYSTEMS
[Negative] : Heme/Lymph [FreeTextEntry8] : occasional pelvic pain and vaginal bleeding (see HPI) [de-identified] : concerned about newly diagnosed malignancy

## 2019-12-20 NOTE — ASSESSMENT
[FreeTextEntry1] : 54 yof, PMH of DM II, presented to ED in 10/2019 after experiencing worsening of chronic, intermittent pelvic pain and vaginal bleeding.  Subsequently diagnosed with FIGO IVB well to moderately differentiated endometrioid cancer with focal squamous differentiation and focal clear cell changes.  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 FIGO IVB endometrial cancer- Patient recently saw Dr. Perez, who recommended chemotherapy alone.  Patient is currently only having occasional, mild vaginal bleeding and intermittent pelvic pain, which responds to tylenol.  Although pathologic findings are not suggestive of Koch Syndrome, family history is significant for history of uterine cancer in paternal grandmother (per patient, diagnosed at around 55 years).  Will consider genetic testing once patient obtains Medicaid (currently receiving only [facility]).  Will plan for carboplatin (AUC 6) and taxol (175 mg/m2) every 3 weeks, with repeat imaging after 3 cycles.  Side effects of nausea, vomiting, myelosuppression, infection, neuropathy, myalgia, alopecia, and allergic reaction were discussed at length.  Sent zofran and compazine to patient's pharmacy.  Patient understands and would like to proceed as soon as possible.  Will check baseline CBC, CMP, and CA-125 today.\par 2. Microcytic anemia-hemoglobin of 9.5 with MCV of 74.2 on 11/15/2019.  May be secondary to iron deficiency from vaginal bleeding.  Will check CBC and iron studies today.\par 3.  Low serum vitamin D level-Will start vitamin D2 50,000 units weekly x 8 weeks.\par \par Patient was seen and examined with Dr. Willard, who agreed with the above plan of care.\par \par

## 2019-12-20 NOTE — PHYSICAL EXAM
[Normal] : affect appropriate [de-identified] : lower abdominal pain to palpation, greatest in area surrounding R iliac crest, no rebound, no guarding

## 2019-12-20 NOTE — HISTORY OF PRESENT ILLNESS
[de-identified] : 54 yof, PMH of DM II, presented to ED in 10/2019 after experiencing worsening of chronic, intermittent pelvic pain and vaginal bleeding.  Patient underwent transvaginal ultrasound, which showed 3.5 x 2.7 x 4.4 cm cervical mass.  CT abdomen/pelvis revealed R iliac chain Ln 1.5 x 1.4 cm, along with 2.2 x 1.8 cm portacaval LNs, and enlargement of uterus.  Endometrial biopsy revealed well to moderately differentiated endometrioid cancer with focal squamous differentiation and focal clear cell changes.  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   CT chest negative for evidence of intrathoracic metastases.  Patient was initially scheduled for ALESHA/BSO on 11/25/2019.  However, MRI pelvis revealed L adnexal peritoneal tumor implants, enlarged pelvic and retroperitoneal lymph nodes, FIGO stage IVB.  ALESHA/BSO was subsequently canceled.  Patient recently saw Dr. Perez, who recommended chemotherapy alone.  Patient is currently only having occasional, mild vaginal bleeding and intermittent pelvic pain, which responds to tylenol.\par \par Family history is significant for history of uterine cancer in paternal grandmother (per patient, diagnosed at around 55 years) and "blood cancer" in maternal grandmother.\par \par Patient lives with her family and is fully functional at baseline.  She used to work as a  but is now no longer working.  She is also is in the process of applying for Medicaid.

## 2019-12-20 NOTE — REASON FOR VISIT
[Initial Consultation] : an initial consultation [Family Member] : family member [Patient Declined  Services] : - None: Patient declined  services [FreeTextEntry3] : Patient requested that son translate for her. [FreeTextEntry2] : FIGO IVB endometrial cancer [TWNoteComboBox1] : Tongan

## 2019-12-24 ENCOUNTER — APPOINTMENT (OUTPATIENT)
Dept: HEMATOLOGY ONCOLOGY | Facility: CLINIC | Age: 54
End: 2019-12-24

## 2019-12-27 ENCOUNTER — APPOINTMENT (OUTPATIENT)
Dept: INFUSION THERAPY | Facility: CLINIC | Age: 54
End: 2019-12-27

## 2019-12-27 RX ORDER — FERUMOXYTOL 510 MG/17ML
510 INJECTION INTRAVENOUS ONCE
Refills: 0 | Status: COMPLETED | OUTPATIENT
Start: 2019-12-27 | End: 2019-12-27

## 2019-12-27 RX ORDER — ACETAMINOPHEN 500 MG
650 TABLET ORAL ONCE
Refills: 0 | Status: COMPLETED | OUTPATIENT
Start: 2019-12-27 | End: 2019-12-27

## 2019-12-27 RX ORDER — HYDROCORTISONE 20 MG
100 TABLET ORAL ONCE
Refills: 0 | Status: COMPLETED | OUTPATIENT
Start: 2019-12-27 | End: 2019-12-27

## 2019-12-27 RX ADMIN — Medication 100 MILLIGRAM(S): at 15:38

## 2019-12-27 RX ADMIN — FERUMOXYTOL 468 MILLIGRAM(S): 510 INJECTION INTRAVENOUS at 16:10

## 2019-12-27 RX ADMIN — Medication 100 MILLIGRAM(S): at 16:08

## 2019-12-27 RX ADMIN — Medication 650 MILLIGRAM(S): at 15:40

## 2019-12-27 RX ADMIN — Medication 650 MILLIGRAM(S): at 15:37

## 2019-12-27 RX ADMIN — FERUMOXYTOL 510 MILLIGRAM(S): 510 INJECTION INTRAVENOUS at 16:40

## 2019-12-29 NOTE — PROGRESS NOTE ADULT - SUBJECTIVE AND OBJECTIVE BOX
PGY3 progress note    Patient seen and examined at bedside, resting comfortably. No complaints. Denies headache, fever, dizziness, n/v, chest pain, SOB, abdominal pain, dysuria, diarrhea.     PE:  Vital Signs Last 24 Hrs  T(C): 36.1 (23 Nov 2019 08:00), Max: 36.6 (22 Nov 2019 18:30)  T(F): 97 (23 Nov 2019 08:00), Max: 97.9 (22 Nov 2019 18:30)  HR: 73 (23 Nov 2019 08:00) (70 - 75)  BP: 103/54 (23 Nov 2019 08:00) (90/53 - 107/56)  BP(mean): --  RR: 18 (23 Nov 2019 08:00) (18 - 18)  SpO2: 97% (22 Nov 2019 18:30) (97% - 97%)    GEN: NAD, AAOX3  CVS: RRR, normal S1/S2  lungs: CTAB  abd: soft, nontender, nondistended, no r/g/r  ext: no calf tenderness or edema    labs:                        8.9    7.91  )-----------( 422      ( 23 Nov 2019 05:40 )             30.3     A positive    Hep C pending hypertension

## 2019-12-31 ENCOUNTER — APPOINTMENT (OUTPATIENT)
Dept: INFUSION THERAPY | Facility: CLINIC | Age: 54
End: 2019-12-31

## 2019-12-31 ENCOUNTER — LABORATORY RESULT (OUTPATIENT)
Age: 54
End: 2019-12-31

## 2019-12-31 RX ORDER — PACLITAXEL 6 MG/ML
290 INJECTION, SOLUTION, CONCENTRATE INTRAVENOUS ONCE
Refills: 0 | Status: COMPLETED | OUTPATIENT
Start: 2019-12-31 | End: 2019-12-31

## 2019-12-31 RX ORDER — DEXAMETHASONE 0.5 MG/5ML
12 ELIXIR ORAL ONCE
Refills: 0 | Status: COMPLETED | OUTPATIENT
Start: 2019-12-31 | End: 2019-12-31

## 2019-12-31 RX ORDER — FOSAPREPITANT DIMEGLUMINE 150 MG/5ML
150 INJECTION, POWDER, LYOPHILIZED, FOR SOLUTION INTRAVENOUS ONCE
Refills: 0 | Status: COMPLETED | OUTPATIENT
Start: 2019-12-31 | End: 2019-12-31

## 2019-12-31 RX ORDER — FAMOTIDINE 10 MG/ML
20 INJECTION INTRAVENOUS ONCE
Refills: 0 | Status: COMPLETED | OUTPATIENT
Start: 2019-12-31 | End: 2019-12-31

## 2019-12-31 RX ORDER — CARBOPLATIN 50 MG
790 VIAL (EA) INTRAVENOUS ONCE
Refills: 0 | Status: COMPLETED | OUTPATIENT
Start: 2019-12-31 | End: 2019-12-31

## 2019-12-31 RX ORDER — DIPHENHYDRAMINE HCL 50 MG
50 CAPSULE ORAL ONCE
Refills: 0 | Status: COMPLETED | OUTPATIENT
Start: 2019-12-31 | End: 2019-12-31

## 2019-12-31 RX ADMIN — PACLITAXEL 182.78 MILLIGRAM(S): 6 INJECTION, SOLUTION, CONCENTRATE INTRAVENOUS at 13:04

## 2019-12-31 RX ADMIN — Medication 183 MILLIGRAM(S): at 11:54

## 2019-12-31 RX ADMIN — FAMOTIDINE 20 MILLIGRAM(S): 10 INJECTION INTRAVENOUS at 12:25

## 2019-12-31 RX ADMIN — Medication 12 MILLIGRAM(S): at 12:15

## 2019-12-31 RX ADMIN — FAMOTIDINE 104 MILLIGRAM(S): 10 INJECTION INTRAVENOUS at 11:55

## 2019-12-31 RX ADMIN — FOSAPREPITANT DIMEGLUMINE 450 MILLIGRAM(S): 150 INJECTION, POWDER, LYOPHILIZED, FOR SOLUTION INTRAVENOUS at 11:54

## 2019-12-31 RX ADMIN — FOSAPREPITANT DIMEGLUMINE 150 MILLIGRAM(S): 150 INJECTION, POWDER, LYOPHILIZED, FOR SOLUTION INTRAVENOUS at 12:56

## 2019-12-31 RX ADMIN — Medication 329 MILLIGRAM(S): at 13:05

## 2019-12-31 RX ADMIN — Medication 50 MILLIGRAM(S): at 12:45

## 2019-12-31 RX ADMIN — Medication 153 MILLIGRAM(S): at 11:55

## 2020-01-05 ENCOUNTER — LABORATORY RESULT (OUTPATIENT)
Age: 55
End: 2020-01-05

## 2020-01-05 NOTE — H&P PST ADULT - NSICDXPASTMEDICALHX_GEN_ALL_CORE_FT
Patient C/O HA, dizziness, and light sensitivity. Symptoms since August.   
PAST MEDICAL HISTORY:  DM (diabetes mellitus)     Uterine cancer

## 2020-01-06 LAB
ALBUMIN SERPL ELPH-MCNC: 3.5 G/DL
ALP BLD-CCNC: 54 U/L
ALT SERPL-CCNC: 13 U/L
ANION GAP SERPL CALC-SCNC: 18 MMOL/L
AST SERPL-CCNC: 23 U/L
BILIRUB SERPL-MCNC: 0.2 MG/DL
BUN SERPL-MCNC: 15 MG/DL
CALCIUM SERPL-MCNC: 9.6 MG/DL
CHLORIDE SERPL-SCNC: 97 MMOL/L
CO2 SERPL-SCNC: 19 MMOL/L
CREAT SERPL-MCNC: <0.5 MG/DL
GLUCOSE SERPL-MCNC: 111 MG/DL
HCT VFR BLD CALC: 34.4 %
HGB BLD-MCNC: 10.7 G/DL
MCHC RBC-ENTMCNC: 22.8 PG
MCHC RBC-ENTMCNC: 31.1 G/DL
MCV RBC AUTO: 73.2 FL
PLATELET # BLD AUTO: 226 K/UL
PMV BLD: 10.8 FL
POTASSIUM SERPL-SCNC: 4.8 MMOL/L
PROT SERPL-MCNC: 8.2 G/DL
RBC # BLD: 4.7 M/UL
RBC # FLD: 24.9 %
SODIUM SERPL-SCNC: 134 MMOL/L
WBC # FLD AUTO: 12.31 K/UL

## 2020-01-14 ENCOUNTER — FORM ENCOUNTER (OUTPATIENT)
Age: 55
End: 2020-01-14

## 2020-01-15 ENCOUNTER — OUTPATIENT (OUTPATIENT)
Dept: OUTPATIENT SERVICES | Facility: HOSPITAL | Age: 55
LOS: 1 days | Discharge: HOME | End: 2020-01-15
Payer: OTHER GOVERNMENT

## 2020-01-15 DIAGNOSIS — R92.2 INCONCLUSIVE MAMMOGRAM: ICD-10-CM

## 2020-01-15 DIAGNOSIS — Z12.31 ENCOUNTER FOR SCREENING MAMMOGRAM FOR MALIGNANT NEOPLASM OF BREAST: ICD-10-CM

## 2020-01-15 PROCEDURE — 76641 ULTRASOUND BREAST COMPLETE: CPT | Mod: 26,50

## 2020-01-15 PROCEDURE — 77067 SCR MAMMO BI INCL CAD: CPT | Mod: 26

## 2020-01-15 PROCEDURE — 77063 BREAST TOMOSYNTHESIS BI: CPT | Mod: 26

## 2020-01-20 ENCOUNTER — APPOINTMENT (OUTPATIENT)
Dept: HEMATOLOGY ONCOLOGY | Facility: CLINIC | Age: 55
End: 2020-01-20
Payer: MEDICAID

## 2020-01-20 ENCOUNTER — LABORATORY RESULT (OUTPATIENT)
Age: 55
End: 2020-01-20

## 2020-01-20 VITALS
WEIGHT: 142 LBS | HEIGHT: 62 IN | TEMPERATURE: 97.2 F | SYSTOLIC BLOOD PRESSURE: 131 MMHG | HEART RATE: 73 BPM | BODY MASS INDEX: 26.13 KG/M2 | DIASTOLIC BLOOD PRESSURE: 58 MMHG

## 2020-01-20 DIAGNOSIS — B00.1 HERPESVIRAL VESICULAR DERMATITIS: ICD-10-CM

## 2020-01-20 LAB
ALBUMIN SERPL ELPH-MCNC: 3.7 G/DL
ALP BLD-CCNC: 65 U/L
ALT SERPL-CCNC: 12 U/L
ANION GAP SERPL CALC-SCNC: 12 MMOL/L
AST SERPL-CCNC: 15 U/L
BILIRUB SERPL-MCNC: <0.2 MG/DL
BUN SERPL-MCNC: 11 MG/DL
CALCIUM SERPL-MCNC: 9.1 MG/DL
CHLORIDE SERPL-SCNC: 103 MMOL/L
CO2 SERPL-SCNC: 24 MMOL/L
CREAT SERPL-MCNC: 0.5 MG/DL
GLUCOSE SERPL-MCNC: 132 MG/DL
HCT VFR BLD CALC: 36.6 %
HGB BLD-MCNC: 11.7 G/DL
MCHC RBC-ENTMCNC: 24.4 PG
MCHC RBC-ENTMCNC: 32 G/DL
MCV RBC AUTO: 76.3 FL
PLATELET # BLD AUTO: 318 K/UL
PMV BLD: 9.8 FL
POTASSIUM SERPL-SCNC: 4.4 MMOL/L
PROT SERPL-MCNC: 7.4 G/DL
RBC # BLD: 4.8 M/UL
RBC # FLD: 29.2 %
SODIUM SERPL-SCNC: 139 MMOL/L
WBC # FLD AUTO: 8.12 K/UL

## 2020-01-20 PROCEDURE — 99215 OFFICE O/P EST HI 40 MIN: CPT

## 2020-01-20 NOTE — REASON FOR VISIT
[Initial Consultation] : an initial consultation [Family Member] : family member [Patient Declined  Services] : - None: Patient declined  services [FreeTextEntry2] : FIGO IVB endometrial cancer [FreeTextEntry3] : Patient requested that son translate for her. [TWNoteComboBox1] : Estonian

## 2020-01-20 NOTE — ASSESSMENT
[FreeTextEntry1] : 54 yof, PMH of DM II,  diagnosed with FIGO IVB well to moderately differentiated endometrioid cancer with focal squamous differentiation and focal clear cell changes.  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 FIGO IVB endometrial cancer\par -  - s/p cycle 1 of  carboplatin (AUC 6) and taxol (175 mg/m2) every 3 weeks, \par -Proceed with cycle #2 of Carboplatin and taxol.\par The chemotherapy dose was adjusted according to pt's height, weight, labs and anticipated tolerance.\par The high risk of complications and complexity associated with antineoplastic therapy administration has been explained to the pt and family.\par Chemotherapy will be administered under attending supervision\par \par -repeat imaging after 3 cycles.  Will repeat CMP and Ca125\par \par -   Although pathologic findings are not suggestive of Koch Syndrome, family history is significant for history of uterine cancer in paternal grandmother (per patient, diagnosed at around 55 years).  Will consider genetic testing once patient obtains Medicaid (currently receiving only [employer] [employer]).  \par \par 2. Microcytic anemia-hemoglobin of 9.5 with MCV of 74.2 on 11/15/2019. secondary to iron deficiency from vaginal bleeding. s/p 2 ferraheme , her Hb now is 11.5 . WIll repeat iron studies .\par 3.  Low serum vitamin D level-started vitamin D2 50,000 units weekly x 8 weeks ( took 3 weeks so far)\par \par Patient was seen and examined with Dr. Willard, who agreed with the above plan of care.\par \par RTC in 3 weeks

## 2020-01-20 NOTE — HISTORY OF PRESENT ILLNESS
[de-identified] : 54 yof, PMH of DM II, presented to ED in 10/2019 after experiencing worsening of chronic, intermittent pelvic pain and vaginal bleeding.  Patient underwent transvaginal ultrasound, which showed 3.5 x 2.7 x 4.4 cm cervical mass.  CT abdomen/pelvis revealed R iliac chain Ln 1.5 x 1.4 cm, along with 2.2 x 1.8 cm portacaval LNs, and enlargement of uterus.  Endometrial biopsy revealed well to moderately differentiated endometrioid cancer with focal squamous differentiation and focal clear cell changes.  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   CT chest negative for evidence of intrathoracic metastases.  Patient was initially scheduled for ALESHA/BSO on 11/25/2019.  However, MRI pelvis revealed L adnexal peritoneal tumor implants, enlarged pelvic and retroperitoneal lymph nodes, FIGO stage IVB.  ALEHSA/BSO was subsequently canceled.  Patient recently saw Dr. Perez, who recommended chemotherapy alone.  Patient is currently only having occasional, mild vaginal bleeding and intermittent pelvic pain, which responds to tylenol.\par \par Family history is significant for history of uterine cancer in paternal grandmother (per patient, diagnosed at around 55 years) and "blood cancer" in maternal grandmother.\par \par Patient lives with her family and is fully functional at baseline.  She used to work as a  but is now no longer working.  She is also is in the process of applying for Medicaid. [de-identified] : 1/20/20 : The patient is here for follow up .She received 2 doses of ferraheme prior to chemo . She tolerated carbo, taxol with mild symptoms of nausea and vomiting. She lost her hair , and reported mild numbness of fingers. She reported also minimal vaginal bleed. Her abdominal pain resolved and she is able to ambulate with no problems

## 2020-01-20 NOTE — REVIEW OF SYSTEMS
[Dysmenorrhea/Abn Vaginal Bleeding] : dysmenorrhea/abnormal vaginal bleeding [Negative] : Heme/Lymph [de-identified] : numbness in fingers

## 2020-01-21 ENCOUNTER — APPOINTMENT (OUTPATIENT)
Dept: INFUSION THERAPY | Facility: CLINIC | Age: 55
End: 2020-01-21

## 2020-01-21 RX ORDER — FOSAPREPITANT DIMEGLUMINE 150 MG/5ML
150 INJECTION, POWDER, LYOPHILIZED, FOR SOLUTION INTRAVENOUS ONCE
Refills: 0 | Status: COMPLETED | OUTPATIENT
Start: 2020-01-21 | End: 2020-01-21

## 2020-01-21 RX ORDER — CARBOPLATIN 50 MG
790 VIAL (EA) INTRAVENOUS ONCE
Refills: 0 | Status: COMPLETED | OUTPATIENT
Start: 2020-01-21 | End: 2020-01-21

## 2020-01-21 RX ORDER — DIPHENHYDRAMINE HCL 50 MG
50 CAPSULE ORAL ONCE
Refills: 0 | Status: COMPLETED | OUTPATIENT
Start: 2020-01-21 | End: 2020-01-21

## 2020-01-21 RX ORDER — PACLITAXEL 6 MG/ML
290 INJECTION, SOLUTION, CONCENTRATE INTRAVENOUS ONCE
Refills: 0 | Status: COMPLETED | OUTPATIENT
Start: 2020-01-21 | End: 2020-01-21

## 2020-01-21 RX ORDER — DEXAMETHASONE 0.5 MG/5ML
12 ELIXIR ORAL ONCE
Refills: 0 | Status: COMPLETED | OUTPATIENT
Start: 2020-01-21 | End: 2020-01-21

## 2020-01-21 RX ORDER — FAMOTIDINE 10 MG/ML
20 INJECTION INTRAVENOUS ONCE
Refills: 0 | Status: COMPLETED | OUTPATIENT
Start: 2020-01-21 | End: 2020-01-21

## 2020-01-21 RX ADMIN — Medication 790 MILLIGRAM(S): at 15:30

## 2020-01-21 RX ADMIN — FAMOTIDINE 156 MILLIGRAM(S): 10 INJECTION INTRAVENOUS at 09:50

## 2020-01-21 RX ADMIN — Medication 153 MILLIGRAM(S): at 10:10

## 2020-01-21 RX ADMIN — Medication 12 MILLIGRAM(S): at 09:50

## 2020-01-21 RX ADMIN — FOSAPREPITANT DIMEGLUMINE 450 MILLIGRAM(S): 150 INJECTION, POWDER, LYOPHILIZED, FOR SOLUTION INTRAVENOUS at 10:30

## 2020-01-21 RX ADMIN — PACLITAXEL 182.78 MILLIGRAM(S): 6 INJECTION, SOLUTION, CONCENTRATE INTRAVENOUS at 11:08

## 2020-01-21 RX ADMIN — Medication 329 MILLIGRAM(S): at 14:30

## 2020-01-21 RX ADMIN — Medication 50 MILLIGRAM(S): at 10:30

## 2020-01-21 RX ADMIN — Medication 183 MILLIGRAM(S): at 09:28

## 2020-01-21 RX ADMIN — FOSAPREPITANT DIMEGLUMINE 150 MILLIGRAM(S): 150 INJECTION, POWDER, LYOPHILIZED, FOR SOLUTION INTRAVENOUS at 10:50

## 2020-01-21 RX ADMIN — PACLITAXEL 290 MILLIGRAM(S): 6 INJECTION, SOLUTION, CONCENTRATE INTRAVENOUS at 14:30

## 2020-01-21 RX ADMIN — FAMOTIDINE 20 MILLIGRAM(S): 10 INJECTION INTRAVENOUS at 10:10

## 2020-01-25 LAB
CANCER AG125 SERPL-ACNC: 30 U/ML
FERRITIN SERPL-MCNC: 296 NG/ML
IRON SATN MFR SERPL: 27 %
IRON SERPL-MCNC: 71 UG/DL
IRON SERPL-MCNC: 77 UG/DL
TIBC SERPL-MCNC: 261 UG/DL
UIBC SERPL-MCNC: 190 UG/DL

## 2020-02-10 ENCOUNTER — APPOINTMENT (OUTPATIENT)
Dept: HEMATOLOGY ONCOLOGY | Facility: CLINIC | Age: 55
End: 2020-02-10
Payer: MEDICAID

## 2020-02-10 ENCOUNTER — LABORATORY RESULT (OUTPATIENT)
Age: 55
End: 2020-02-10

## 2020-02-10 VITALS
BODY MASS INDEX: 25.76 KG/M2 | WEIGHT: 140 LBS | HEIGHT: 62 IN | HEART RATE: 80 BPM | TEMPERATURE: 99.3 F | DIASTOLIC BLOOD PRESSURE: 68 MMHG | SYSTOLIC BLOOD PRESSURE: 141 MMHG

## 2020-02-10 LAB
HCT VFR BLD CALC: 38 %
HGB BLD-MCNC: 12.5 G/DL
MCHC RBC-ENTMCNC: 25.8 PG
MCHC RBC-ENTMCNC: 32.9 G/DL
MCV RBC AUTO: 78.4 FL
PLATELET # BLD AUTO: 247 K/UL
PMV BLD: 10 FL
RBC # BLD: 4.85 M/UL
RBC # FLD: 28.2 %
WBC # FLD AUTO: 7.84 K/UL

## 2020-02-10 PROCEDURE — 99215 OFFICE O/P EST HI 40 MIN: CPT

## 2020-02-10 NOTE — ASSESSMENT
[FreeTextEntry1] : 54 yof, PMH of DM II,  diagnosed with FIGO IVB well to moderately differentiated endometrioid cancer with focal squamous differentiation and focal clear cell changes.  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 FIGO IVB endometrial cancer\par - s/p 2 cycles of  carboplatin (AUC 6) and taxol (175 mg/m2) every 3 weeks, and tolerating well so far\par - We will proceed with cycle 3 tomorrow . \par - Will obtain CT chest/abdomen and pelvis after cycle 3\par \par The chemotherapy dose was adjusted according to pt's height, weight, labs and anticipated tolerance.\par The high risk of complications and complexity associated with antineoplastic therapy administration has been explained to the pt and family.\par Chemotherapy will be administered under attending supervision\par - Repeat CMP and Ca125 \par -  Although pathologic findings are not suggestive of Koch Syndrome, family history is significant for history of uterine cancer in paternal grandmother (per patient, diagnosed at around 55 years).  Will consider genetic testing in the future\par \par 2. Microcytic anemia-hemoglobin of 9.5 with MCV of 74.2 on 11/15/2019. secondary to iron deficiency from vaginal bleeding. s/p 2 ferraheme , her iron studies were repeated, and are WNL\par 3.  Low serum vitamin D level-started vitamin D2 50,000 units weekly x 8 weeks\par \par Patient was seen and examined with Dr. Willard, who agreed with the above plan of care.\par \par RTC in 3 weeks

## 2020-02-10 NOTE — HISTORY OF PRESENT ILLNESS
[de-identified] : 54 yof, PMH of DM II, presented to ED in 10/2019 after experiencing worsening of chronic, intermittent pelvic pain and vaginal bleeding.  Patient underwent transvaginal ultrasound, which showed 3.5 x 2.7 x 4.4 cm cervical mass.  CT abdomen/pelvis revealed R iliac chain Ln 1.5 x 1.4 cm, along with 2.2 x 1.8 cm portacaval LNs, and enlargement of uterus.  Endometrial biopsy revealed well to moderately differentiated endometrioid cancer with focal squamous differentiation and focal clear cell changes.  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   CT chest negative for evidence of intrathoracic metastases.  Patient was initially scheduled for ALESHA/BSO on 11/25/2019.  However, MRI pelvis revealed L adnexal peritoneal tumor implants, enlarged pelvic and retroperitoneal lymph nodes, FIGO stage IVB.  ALESHA/BSO was subsequently canceled.  Patient recently saw Dr. Perez, who recommended chemotherapy alone.  Patient is currently only having occasional, mild vaginal bleeding and intermittent pelvic pain, which responds to tylenol.\par \par Family history is significant for history of uterine cancer in paternal grandmother (per patient, diagnosed at around 55 years) and "blood cancer" in maternal grandmother.\par \par Patient lives with her family and is fully functional at baseline.  She used to work as a  but is now no longer working.  She is also is in the process of applying for Medicaid. [de-identified] : 1/20/20 : The patient is here for follow up .She received 2 doses of ferraheme prior to chemo . She tolerated carbo, taxol with mild symptoms of nausea and vomiting. She lost her hair , and reported mild numbness of fingers. She reported also minimal vaginal bleed. Her abdominal pain resolved and she is able to ambulate with no problems\par \par 2/10/2020: The patient is here for follow up , she tolerated her second cycle of chemotherapy with no problems , except for mild nausea and vomiting for the first few days. She also reported some mucousy discharge from vagina. She is eating well , no abdominal pain.

## 2020-02-10 NOTE — REVIEW OF SYSTEMS
[Dysmenorrhea/Abn Vaginal Bleeding] : dysmenorrhea/abnormal vaginal bleeding [Negative] : Heme/Lymph [de-identified] : numbness in fingers

## 2020-02-10 NOTE — PHYSICAL EXAM
[Fully active, able to carry on all pre-disease performance without restriction] : Status 0 - Fully active, able to carry on all pre-disease performance without restriction [Normal] : affect appropriate [de-identified] : NO pain on palpation

## 2020-02-10 NOTE — REASON FOR VISIT
[Initial Consultation] : an initial consultation [Patient Declined  Services] : - None: Patient declined  services [Family Member] : family member [FreeTextEntry2] : FIGO IVB endometrial cancer [TWNoteComboBox1] : Citizen of [place] [FreeTextEntry3] : Patient requested that son translate for her.

## 2020-02-11 ENCOUNTER — APPOINTMENT (OUTPATIENT)
Dept: INFUSION THERAPY | Facility: CLINIC | Age: 55
End: 2020-02-11

## 2020-02-11 LAB
ALBUMIN SERPL ELPH-MCNC: 4 G/DL
ALP BLD-CCNC: 72 U/L
ALT SERPL-CCNC: 15 U/L
ANION GAP SERPL CALC-SCNC: 12 MMOL/L
AST SERPL-CCNC: 15 U/L
BILIRUB SERPL-MCNC: <0.2 MG/DL
BUN SERPL-MCNC: 19 MG/DL
CALCIUM SERPL-MCNC: 9.3 MG/DL
CHLORIDE SERPL-SCNC: 101 MMOL/L
CO2 SERPL-SCNC: 24 MMOL/L
CREAT SERPL-MCNC: 0.6 MG/DL
GLUCOSE SERPL-MCNC: 169 MG/DL
POTASSIUM SERPL-SCNC: 4.1 MMOL/L
PROT SERPL-MCNC: 7.5 G/DL
SODIUM SERPL-SCNC: 137 MMOL/L

## 2020-02-11 RX ORDER — FOSAPREPITANT DIMEGLUMINE 150 MG/5ML
150 INJECTION, POWDER, LYOPHILIZED, FOR SOLUTION INTRAVENOUS ONCE
Refills: 0 | Status: COMPLETED | OUTPATIENT
Start: 2020-02-11 | End: 2020-02-11

## 2020-02-11 RX ORDER — DIPHENHYDRAMINE HCL 50 MG
50 CAPSULE ORAL ONCE
Refills: 0 | Status: COMPLETED | OUTPATIENT
Start: 2020-02-11 | End: 2020-02-11

## 2020-02-11 RX ORDER — CARBOPLATIN 50 MG
790 VIAL (EA) INTRAVENOUS ONCE
Refills: 0 | Status: COMPLETED | OUTPATIENT
Start: 2020-02-11 | End: 2020-02-11

## 2020-02-11 RX ORDER — DEXAMETHASONE 0.5 MG/5ML
12 ELIXIR ORAL ONCE
Refills: 0 | Status: COMPLETED | OUTPATIENT
Start: 2020-02-11 | End: 2020-02-11

## 2020-02-11 RX ORDER — FAMOTIDINE 10 MG/ML
20 INJECTION INTRAVENOUS ONCE
Refills: 0 | Status: COMPLETED | OUTPATIENT
Start: 2020-02-11 | End: 2020-02-11

## 2020-02-11 RX ORDER — PACLITAXEL 6 MG/ML
290 INJECTION, SOLUTION, CONCENTRATE INTRAVENOUS ONCE
Refills: 0 | Status: COMPLETED | OUTPATIENT
Start: 2020-02-11 | End: 2020-02-11

## 2020-02-11 RX ADMIN — Medication 183 MILLIGRAM(S): at 09:10

## 2020-02-11 RX ADMIN — FOSAPREPITANT DIMEGLUMINE 450 MILLIGRAM(S): 150 INJECTION, POWDER, LYOPHILIZED, FOR SOLUTION INTRAVENOUS at 10:10

## 2020-02-11 RX ADMIN — Medication 790 MILLIGRAM(S): at 14:40

## 2020-02-11 RX ADMIN — Medication 12 MILLIGRAM(S): at 09:30

## 2020-02-11 RX ADMIN — Medication 50 MILLIGRAM(S): at 10:10

## 2020-02-11 RX ADMIN — FAMOTIDINE 156 MILLIGRAM(S): 10 INJECTION INTRAVENOUS at 09:30

## 2020-02-11 RX ADMIN — Medication 329 MILLIGRAM(S): at 13:40

## 2020-02-11 RX ADMIN — Medication 153 MILLIGRAM(S): at 09:50

## 2020-02-11 RX ADMIN — PACLITAXEL 182.78 MILLIGRAM(S): 6 INJECTION, SOLUTION, CONCENTRATE INTRAVENOUS at 10:35

## 2020-02-11 RX ADMIN — FAMOTIDINE 20 MILLIGRAM(S): 10 INJECTION INTRAVENOUS at 09:50

## 2020-02-11 RX ADMIN — PACLITAXEL 290 MILLIGRAM(S): 6 INJECTION, SOLUTION, CONCENTRATE INTRAVENOUS at 13:35

## 2020-02-11 RX ADMIN — FOSAPREPITANT DIMEGLUMINE 150 MILLIGRAM(S): 150 INJECTION, POWDER, LYOPHILIZED, FOR SOLUTION INTRAVENOUS at 10:30

## 2020-02-12 LAB — CANCER AG125 SERPL-ACNC: 20 U/ML

## 2020-02-26 ENCOUNTER — FORM ENCOUNTER (OUTPATIENT)
Age: 55
End: 2020-02-26

## 2020-02-27 ENCOUNTER — OUTPATIENT (OUTPATIENT)
Dept: OUTPATIENT SERVICES | Facility: HOSPITAL | Age: 55
LOS: 1 days | Discharge: HOME | End: 2020-02-27
Payer: MEDICAID

## 2020-02-27 DIAGNOSIS — C54.1 MALIGNANT NEOPLASM OF ENDOMETRIUM: ICD-10-CM

## 2020-02-27 DIAGNOSIS — R10.9 UNSPECIFIED ABDOMINAL PAIN: ICD-10-CM

## 2020-02-27 DIAGNOSIS — R10.2 PELVIC AND PERINEAL PAIN: ICD-10-CM

## 2020-02-27 PROCEDURE — 74177 CT ABD & PELVIS W/CONTRAST: CPT | Mod: 26

## 2020-02-27 PROCEDURE — 71260 CT THORAX DX C+: CPT | Mod: 26

## 2020-03-02 ENCOUNTER — LABORATORY RESULT (OUTPATIENT)
Age: 55
End: 2020-03-02

## 2020-03-02 ENCOUNTER — APPOINTMENT (OUTPATIENT)
Dept: HEMATOLOGY ONCOLOGY | Facility: CLINIC | Age: 55
End: 2020-03-02
Payer: MEDICAID

## 2020-03-02 VITALS
HEIGHT: 60.5 IN | HEART RATE: 77 BPM | DIASTOLIC BLOOD PRESSURE: 63 MMHG | BODY MASS INDEX: 27.16 KG/M2 | TEMPERATURE: 97.8 F | SYSTOLIC BLOOD PRESSURE: 117 MMHG | WEIGHT: 142 LBS

## 2020-03-02 LAB
HCT VFR BLD CALC: 38.7 %
HGB BLD-MCNC: 12.8 G/DL
MCHC RBC-ENTMCNC: 27.1 PG
MCHC RBC-ENTMCNC: 33.1 G/DL
MCV RBC AUTO: 81.8 FL
PLATELET # BLD AUTO: 278 K/UL
PMV BLD: 9.6 FL
RBC # BLD: 4.73 M/UL
RBC # FLD: 25.1 %
WBC # FLD AUTO: 7.58 K/UL

## 2020-03-02 PROCEDURE — 99215 OFFICE O/P EST HI 40 MIN: CPT

## 2020-03-02 NOTE — REVIEW OF SYSTEMS
[Dysmenorrhea/Abn Vaginal Bleeding] : dysmenorrhea/abnormal vaginal bleeding [Negative] : Musculoskeletal [de-identified] : numbness in fingers

## 2020-03-02 NOTE — ASSESSMENT
[FreeTextEntry1] : 54 yof, PMH of DM II,  diagnosed with FIGO IVB well to moderately differentiated endometrioid cancer with focal squamous differentiation and focal clear cell changes.  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 FIGO IVB endometrial cancer\par - s/p 3 cycles of  carboplatin (AUC 6) and taxol (175 mg/m2) every 3 weeks, and tolerating well so far\par - CT C/A/P after cycle 3 reviewed and showing mixed response. Ca 125 is 20 from 2/10/2020\par - We will proceed with cycle 4 tomorrow . \par - Will send for NGS testing\par \par The chemotherapy dose was adjusted according to pt's height, weight, labs and anticipated tolerance.\par The high risk of complications and complexity associated with antineoplastic therapy administration has been explained to the pt and family.\par Chemotherapy will be administered under attending supervision\par - Repeat CMP and Ca125  today\par -  Although pathologic findings are not suggestive of Koch Syndrome, family history is significant for history of uterine cancer in paternal grandmother (per patient, diagnosed at around 55 years).  Will consider genetic testing \par \par 2. Microcytic anemia-hemoglobin of 9.5 with MCV of 74.2 on 11/15/2019. secondary to iron deficiency from vaginal bleeding. s/p 2 ferraheme , her iron studies were repeated, and are WNL\par 3.  Low serum vitamin D level-started vitamin D2 50,000 units weekly x 8 weeks. completed, continue Vid 2000 units daily\par \par Patient was seen and examined with Dr. Willard, who agreed with the above plan of care.\par \par RTC in 3 weeks

## 2020-03-02 NOTE — PHYSICAL EXAM
[Fully active, able to carry on all pre-disease performance without restriction] : Status 0 - Fully active, able to carry on all pre-disease performance without restriction [Normal] : affect appropriate [de-identified] : NO pain on palpation

## 2020-03-02 NOTE — HISTORY OF PRESENT ILLNESS
[de-identified] : 1/20/20 : The patient is here for follow up .She received 2 doses of ferraheme prior to chemo . She tolerated carbo, taxol with mild symptoms of nausea and vomiting. She lost her hair , and reported mild numbness of fingers. She reported also minimal vaginal bleed. Her abdominal pain resolved and she is able to ambulate with no problems\par \par 2/10/2020: The patient is here for follow up , she tolerated her second cycle of chemotherapy with no problems , except for mild nausea and vomiting for the first few days. She also reported some mucousy discharge from vagina. She is eating well , no abdominal pain. \par \par 3/2/2020: The patient is here for follow up . She is tolerating chemotherapy with no major side effects except for nausea in the following days.  She had a CT C/A/P after her 3rd cycle which showed mixed response. She denies any vaginal bleed, no rectal pain on defecation, no abdominal pain.  [de-identified] : 54 yof, PMH of DM II, presented to ED in 10/2019 after experiencing worsening of chronic, intermittent pelvic pain and vaginal bleeding.  Patient underwent transvaginal ultrasound, which showed 3.5 x 2.7 x 4.4 cm cervical mass.  CT abdomen/pelvis revealed R iliac chain Ln 1.5 x 1.4 cm, along with 2.2 x 1.8 cm portacaval LNs, and enlargement of uterus.  Endometrial biopsy revealed well to moderately differentiated endometrioid cancer with focal squamous differentiation and focal clear cell changes.  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   CT chest negative for evidence of intrathoracic metastases.  Patient was initially scheduled for ALESHA/BSO on 11/25/2019.  However, MRI pelvis revealed L adnexal peritoneal tumor implants, enlarged pelvic and retroperitoneal lymph nodes, FIGO stage IVB.  ALESHA/BSO was subsequently canceled.  Patient recently saw Dr. Perez, who recommended chemotherapy alone.  Patient is currently only having occasional, mild vaginal bleeding and intermittent pelvic pain, which responds to tylenol.\par \par Family history is significant for history of uterine cancer in paternal grandmother (per patient, diagnosed at around 55 years) and "blood cancer" in maternal grandmother.\par \par Patient lives with her family and is fully functional at baseline.  She used to work as a  but is now no longer working.  She is also is in the process of applying for Medicaid.

## 2020-03-02 NOTE — REASON FOR VISIT
[Family Member] : family member [Patient Declined  Services] : - None: Patient declined  services [Follow-Up Visit] : a follow-up [FreeTextEntry3] : Patient requested that son translate for her. [FreeTextEntry2] : FIGO IVB endometrial cancer [TWNoteComboBox1] : Libyan

## 2020-03-02 NOTE — RESULTS/DATA
[FreeTextEntry1] : EXAM:  CT ABDOMEN AND PELVIS OC IC      \par \par \par PROCEDURE DATE:  02/27/2020  \par \par \par \par \par INTERPRETATION:  CLINICAL STATEMENT: Endometrial cancer  \par \par TECHNIQUE: Contiguous axial CT images were obtained from the lower chest to the pubic symphysis after 100 cc of Optiray 320 intravenous contrast.  Oral contrast was given.  Reformatted images in the coronal and sagittal planes were acquired.\par \par COMPARISON CT: 9/17/2018 and 10/12/2019; chest CT 11/22/2019\par \par FINDINGS:\par \par LOWER CHEST: Please see chest CT report for chest findings.\par \par HEPATOBILIARY: Unremarkable..\par \par SPLEEN: Unremarkable..\par \par PANCREAS: Unremarkable..\par \par ADRENAL GLANDS: Unremarkable..\par \par KIDNEYS: Subcentimeter hypodensities are too small to characterize. There is no hydronephrosis.\par \par ABDOMINOPELVIC NODES: There is new common iliac adenopathy on the right measuring 2.7 x 1.8 cm in image 85 of series 2. Other areas of retroperitoneal adenopathy have improved since prior examination with residual disease still present. Infiltrative findings within the upper abdomen and peritoneal compartments as seen on prior chest CT have resolved.\par \par PELVIC ORGANS: Enlarged and heterogeneous uterus appears to have decreased in size slightly since prior examination now measuring 9.6  x 12.5 x 6.8 cm and previously measured 11 x 15 x 9 cm. The area of cervical cavitation is no longer seen. However, now posterior to the ureters is diffuse soft tissue nodularity with associated fluid for example on image 100 series 2. There is also a new small pocket of loculated ascites anterior to the rectum measuring 4.4 x 3.4 cm. Uterus and adnexa are incompletely evaluated on CT.\par \par PERITONEUM/MESENTERY/BOWEL: There is no free air or obstruction.\par \par BONES/SOFT TISSUES: Degenerative change\par \par IMPRESSION:\par \par Findings consistent with mixed response to treatment.\par \par Please see chest CT report for chest findings.\par \par \par \par \par \par \par AMANDA BAH M.D., ATTENDING RADIOLOGIST\par This document has been electronically signed. Feb 28 2020 10:25AM\par   \par \par   \par \par \par [id number]^RI^DC\par \par  EXAM:  CT CHEST IC      \par \par \par PROCEDURE DATE:  02/27/2020  \par \par \par \par \par INTERPRETATION:  Reason for study: Endometrial carcinoma.\par \par Technique: On the day of this examination, an IV cannula was placed in the radiology department.  Approximately 100 cc of Optiray 320 was administered intravenously and multiple transaxial images of the thorax was obtained.  0 cc of contrast material was discarded.\par \par Coronal and sagittal reformatted images were performed to better evaluate anatomic relationships and for possible preoperative planning.\par \par CT abdomen and pelvis  performed on the same day, see separate report.\par \par \par \par \par Comparison: CT thorax 11/22/2019.\par \par Findings:\par Tubes/lines:none\par \par Central airways/ Lung parenchyma/ pleura : No endobronchial obstruction, mass or pleural effusions are noted. There is no evidence of bronchiectasis or honeycombing.Upper lung zone predominant emphysema, unchanged. Nonspecific mosaic attenuation left lower lobe (4/137).\par \par Pulmonary nodules: No suspicious pulmonary nodules.\par \par \par \par \par Chest wall/axilla: unremarkable\par \par Mediastinum/Great vessels: No adenopathy or mass. No circumferential pericardial effusions. Stable trace left-sided pericardial fluid. Great vessels are normal in caliber.\par \par \par Osseous structures:No suspicious osseous lesions.\par \par \par Impression:\par \par No current CT evidence of thoracic metastatic disease.\par \par Stable upper lung zone emphysema.\par \par \par CT abdomen and pelvis  performed on the same day, see separate report.\par \par \par \par \par \par \par \par \par MONTRELL ESCAMILLA M.D., ATTENDING RADIOLOGIST\par This document has been electronically signed. Feb 28 2020 12:21PM\par   \par \par   \par \par \par [id number]^RI^DC

## 2020-03-03 ENCOUNTER — APPOINTMENT (OUTPATIENT)
Dept: INFUSION THERAPY | Facility: CLINIC | Age: 55
End: 2020-03-03

## 2020-03-03 LAB
ALBUMIN SERPL ELPH-MCNC: 4 G/DL
ALP BLD-CCNC: 61 U/L
ALT SERPL-CCNC: 16 U/L
ANION GAP SERPL CALC-SCNC: 13 MMOL/L
AST SERPL-CCNC: 16 U/L
BILIRUB SERPL-MCNC: <0.2 MG/DL
BUN SERPL-MCNC: 15 MG/DL
CALCIUM SERPL-MCNC: 9.5 MG/DL
CHLORIDE SERPL-SCNC: 101 MMOL/L
CO2 SERPL-SCNC: 24 MMOL/L
CREAT SERPL-MCNC: 0.6 MG/DL
GLUCOSE SERPL-MCNC: 162 MG/DL
POTASSIUM SERPL-SCNC: 4.5 MMOL/L
PROT SERPL-MCNC: 7.3 G/DL
SODIUM SERPL-SCNC: 138 MMOL/L

## 2020-03-03 RX ORDER — PACLITAXEL 6 MG/ML
290 INJECTION, SOLUTION, CONCENTRATE INTRAVENOUS ONCE
Refills: 0 | Status: COMPLETED | OUTPATIENT
Start: 2020-03-03 | End: 2020-03-03

## 2020-03-03 RX ORDER — FOSAPREPITANT DIMEGLUMINE 150 MG/5ML
150 INJECTION, POWDER, LYOPHILIZED, FOR SOLUTION INTRAVENOUS ONCE
Refills: 0 | Status: COMPLETED | OUTPATIENT
Start: 2020-03-03 | End: 2020-03-03

## 2020-03-03 RX ORDER — FAMOTIDINE 10 MG/ML
20 INJECTION INTRAVENOUS ONCE
Refills: 0 | Status: COMPLETED | OUTPATIENT
Start: 2020-03-03 | End: 2020-03-03

## 2020-03-03 RX ORDER — DEXAMETHASONE 0.5 MG/5ML
12 ELIXIR ORAL ONCE
Refills: 0 | Status: COMPLETED | OUTPATIENT
Start: 2020-03-03 | End: 2020-03-03

## 2020-03-03 RX ORDER — CARBOPLATIN 50 MG
790 VIAL (EA) INTRAVENOUS ONCE
Refills: 0 | Status: COMPLETED | OUTPATIENT
Start: 2020-03-03 | End: 2020-03-03

## 2020-03-03 RX ORDER — DIPHENHYDRAMINE HCL 50 MG
50 CAPSULE ORAL ONCE
Refills: 0 | Status: COMPLETED | OUTPATIENT
Start: 2020-03-03 | End: 2020-03-03

## 2020-03-03 RX ADMIN — Medication 50 MILLIGRAM(S): at 10:35

## 2020-03-03 RX ADMIN — Medication 153 MILLIGRAM(S): at 10:14

## 2020-03-03 RX ADMIN — Medication 12 MILLIGRAM(S): at 09:55

## 2020-03-03 RX ADMIN — PACLITAXEL 182.78 MILLIGRAM(S): 6 INJECTION, SOLUTION, CONCENTRATE INTRAVENOUS at 10:43

## 2020-03-03 RX ADMIN — FOSAPREPITANT DIMEGLUMINE 450 MILLIGRAM(S): 150 INJECTION, POWDER, LYOPHILIZED, FOR SOLUTION INTRAVENOUS at 10:35

## 2020-03-03 RX ADMIN — Medication 329 MILLIGRAM(S): at 10:44

## 2020-03-03 RX ADMIN — FAMOTIDINE 156 MILLIGRAM(S): 10 INJECTION INTRAVENOUS at 09:54

## 2020-03-03 RX ADMIN — Medication 183 MILLIGRAM(S): at 09:34

## 2020-03-03 RX ADMIN — FOSAPREPITANT DIMEGLUMINE 150 MILLIGRAM(S): 150 INJECTION, POWDER, LYOPHILIZED, FOR SOLUTION INTRAVENOUS at 10:55

## 2020-03-03 RX ADMIN — FAMOTIDINE 20 MILLIGRAM(S): 10 INJECTION INTRAVENOUS at 10:15

## 2020-03-10 ENCOUNTER — APPOINTMENT (OUTPATIENT)
Dept: OBGYN | Facility: CLINIC | Age: 55
End: 2020-03-10

## 2020-03-10 ENCOUNTER — OUTPATIENT (OUTPATIENT)
Dept: OUTPATIENT SERVICES | Facility: HOSPITAL | Age: 55
LOS: 1 days | Discharge: HOME | End: 2020-03-10

## 2020-03-10 VITALS
WEIGHT: 146 LBS | BODY MASS INDEX: 27.92 KG/M2 | HEIGHT: 60.5 IN | DIASTOLIC BLOOD PRESSURE: 76 MMHG | SYSTOLIC BLOOD PRESSURE: 120 MMHG

## 2020-03-10 DIAGNOSIS — Z02.9 ENCOUNTER FOR ADMINISTRATIVE EXAMINATIONS, UNSPECIFIED: ICD-10-CM

## 2020-03-10 DIAGNOSIS — C54.1 MALIGNANT NEOPLASM OF ENDOMETRIUM: ICD-10-CM

## 2020-03-23 ENCOUNTER — APPOINTMENT (OUTPATIENT)
Dept: HEMATOLOGY ONCOLOGY | Facility: CLINIC | Age: 55
End: 2020-03-23
Payer: MEDICAID

## 2020-03-23 ENCOUNTER — LABORATORY RESULT (OUTPATIENT)
Age: 55
End: 2020-03-23

## 2020-03-23 ENCOUNTER — OUTPATIENT (OUTPATIENT)
Dept: OUTPATIENT SERVICES | Facility: HOSPITAL | Age: 55
LOS: 1 days | Discharge: HOME | End: 2020-03-23

## 2020-03-23 VITALS
TEMPERATURE: 98.2 F | HEIGHT: 60.5 IN | WEIGHT: 147 LBS | SYSTOLIC BLOOD PRESSURE: 135 MMHG | HEART RATE: 74 BPM | DIASTOLIC BLOOD PRESSURE: 60 MMHG | BODY MASS INDEX: 28.12 KG/M2

## 2020-03-23 DIAGNOSIS — C54.1 MALIGNANT NEOPLASM OF ENDOMETRIUM: ICD-10-CM

## 2020-03-23 DIAGNOSIS — Z51.11 ENCOUNTER FOR ANTINEOPLASTIC CHEMOTHERAPY: ICD-10-CM

## 2020-03-23 DIAGNOSIS — R79.89 OTHER SPECIFIED ABNORMAL FINDINGS OF BLOOD CHEMISTRY: ICD-10-CM

## 2020-03-23 DIAGNOSIS — B00.1 HERPESVIRAL VESICULAR DERMATITIS: ICD-10-CM

## 2020-03-23 DIAGNOSIS — D50.9 IRON DEFICIENCY ANEMIA, UNSPECIFIED: ICD-10-CM

## 2020-03-23 LAB
HCT VFR BLD CALC: 37.4 %
HGB BLD-MCNC: 12.8 G/DL
MCHC RBC-ENTMCNC: 28.3 PG
MCHC RBC-ENTMCNC: 34.2 G/DL
MCV RBC AUTO: 82.7 FL
PLATELET # BLD AUTO: 264 K/UL
PMV BLD: 10.5 FL
RBC # BLD: 4.52 M/UL
RBC # FLD: 20.6 %
WBC # FLD AUTO: 9.92 K/UL

## 2020-03-23 PROCEDURE — 99215 OFFICE O/P EST HI 40 MIN: CPT

## 2020-03-23 NOTE — REVIEW OF SYSTEMS
[Dysmenorrhea/Abn Vaginal Bleeding] : dysmenorrhea/abnormal vaginal bleeding [Negative] : Heme/Lymph [de-identified] : numbness in fingers

## 2020-03-23 NOTE — ASSESSMENT
[FreeTextEntry1] : 54 yof, PMH of DM II,  diagnosed with FIGO IVB well to moderately differentiated endometrioid cancer with focal squamous differentiation and focal clear cell changes.  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 FIGO IVB endometrial cancer\par - s/p 4 cycles of  carboplatin (AUC 6) and taxol (175 mg/m2) every 3 weeks, and tolerating well so far\par - CT C/A/P after cycle 3 reviewed and showing mixed response. Ca 125 is 20 from 2/15/2020\par - We will proceed with cycle 5 tomorrow . \par - NGS testing sent and reviewed , she is a candidate for keytruda\par \par The chemotherapy dose was adjusted according to pt's height, weight, labs and anticipated tolerance.\par The high risk of complications and complexity associated with antineoplastic therapy administration has been explained to the pt and family.\par Chemotherapy will be administered under attending supervision\par - Repeat CMP and Ca125  today\par -  Although pathologic findings are not suggestive of Koch Syndrome, family history is significant for history of uterine cancer in paternal grandmother (per patient, diagnosed at around 55 years), we offered  genetic testing - \par  used to explain it to the patient, but patient refused to be tested \par \par 2. Microcytic anemia-hemoglobin of 9.5 with MCV of 74.2 on 11/15/2019. secondary to iron deficiency from vaginal bleeding. s/p 2 ferraheme , her iron studies were repeated, and are WNL\par 3.  Low serum vitamin D level-started vitamin D2 50,000 units weekly x 8 weeks. completed, continue Vid 2000 units daily\par \par Patient was seen and examined with Dr. Willard, who agreed with the above plan of care.\par \par RTC in 3 weeks

## 2020-03-23 NOTE — REASON FOR VISIT
[Follow-Up Visit] : a follow-up [Family Member] : family member [Patient Declined  Services] : - None: Patient declined  services [FreeTextEntry2] : FIGO IVB endometrial cancer [FreeTextEntry3] : Patient requested that son translate for her. [TWNoteComboBox1] : Dominican

## 2020-03-23 NOTE — PHYSICAL EXAM
[Fully active, able to carry on all pre-disease performance without restriction] : Status 0 - Fully active, able to carry on all pre-disease performance without restriction [Normal] : affect appropriate [de-identified] : NO pain on palpation

## 2020-03-23 NOTE — HISTORY OF PRESENT ILLNESS
[de-identified] : 54 yof, PMH of DM II, presented to ED in 10/2019 after experiencing worsening of chronic, intermittent pelvic pain and vaginal bleeding.  Patient underwent transvaginal ultrasound, which showed 3.5 x 2.7 x 4.4 cm cervical mass.  CT abdomen/pelvis revealed R iliac chain Ln 1.5 x 1.4 cm, along with 2.2 x 1.8 cm portacaval LNs, and enlargement of uterus.  Endometrial biopsy revealed well to moderately differentiated endometrioid cancer with focal squamous differentiation and focal clear cell changes.  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   CT chest negative for evidence of intrathoracic metastases.  Patient was initially scheduled for ALESHA/BSO on 11/25/2019.  However, MRI pelvis revealed L adnexal peritoneal tumor implants, enlarged pelvic and retroperitoneal lymph nodes, FIGO stage IVB.  ALESHA/BSO was subsequently canceled.  Patient recently saw Dr. Perez, who recommended chemotherapy alone.  Patient is currently only having occasional, mild vaginal bleeding and intermittent pelvic pain, which responds to tylenol.\par \par Family history is significant for history of uterine cancer in paternal grandmother (per patient, diagnosed at around 55 years) and "blood cancer" in maternal grandmother.\par \par Patient lives with her family and is fully functional at baseline.  She used to work as a  but is now no longer working.  She is also is in the process of applying for Medicaid. [de-identified] : 1/20/20 : The patient is here for follow up .She received 2 doses of ferraheme prior to chemo . She tolerated carbo, taxol with mild symptoms of nausea and vomiting. She lost her hair , and reported mild numbness of fingers. She reported also minimal vaginal bleed. Her abdominal pain resolved and she is able to ambulate with no problems\par \par 2/10/2020: The patient is here for follow up , she tolerated her second cycle of chemotherapy with no problems , except for mild nausea and vomiting for the first few days. She also reported some mucousy discharge from vagina. She is eating well , no abdominal pain. \par \par 3/2/2020: The patient is here for follow up . She is tolerating chemotherapy with no major side effects except for nausea in the following days.  She had a CT C/A/P after her 3rd cycle which showed mixed response. She denies any vaginal bleed, no rectal pain on defecation, no abdominal pain. \par \par 3/23/2020: The patient is here for follow up. She is tolerating chemotherapy with no major side effects . She is reporting nausea in the following days after chemotherapy. She denies fever, chills, abdominal pain , no constipation or diarrhea . No vaginal bleeding

## 2020-03-23 NOTE — RESULTS/DATA
[FreeTextEntry1] : EXAM:  CT ABDOMEN AND PELVIS OC IC      \par \par \par PROCEDURE DATE:  02/27/2020  \par \par \par \par \par INTERPRETATION:  CLINICAL STATEMENT: Endometrial cancer  \par \par TECHNIQUE: Contiguous axial CT images were obtained from the lower chest to the pubic symphysis after 100 cc of Optiray 320 intravenous contrast.  Oral contrast was given.  Reformatted images in the coronal and sagittal planes were acquired.\par \par COMPARISON CT: 9/17/2018 and 10/12/2019; chest CT 11/22/2019\par \par FINDINGS:\par \par LOWER CHEST: Please see chest CT report for chest findings.\par \par HEPATOBILIARY: Unremarkable..\par \par SPLEEN: Unremarkable..\par \par PANCREAS: Unremarkable..\par \par ADRENAL GLANDS: Unremarkable..\par \par KIDNEYS: Subcentimeter hypodensities are too small to characterize. There is no hydronephrosis.\par \par ABDOMINOPELVIC NODES: There is new common iliac adenopathy on the right measuring 2.7 x 1.8 cm in image 85 of series 2. Other areas of retroperitoneal adenopathy have improved since prior examination with residual disease still present. Infiltrative findings within the upper abdomen and peritoneal compartments as seen on prior chest CT have resolved.\par \par PELVIC ORGANS: Enlarged and heterogeneous uterus appears to have decreased in size slightly since prior examination now measuring 9.6  x 12.5 x 6.8 cm and previously measured 11 x 15 x 9 cm. The area of cervical cavitation is no longer seen. However, now posterior to the ureters is diffuse soft tissue nodularity with associated fluid for example on image 100 series 2. There is also a new small pocket of loculated ascites anterior to the rectum measuring 4.4 x 3.4 cm. Uterus and adnexa are incompletely evaluated on CT.\par \par PERITONEUM/MESENTERY/BOWEL: There is no free air or obstruction.\par \par BONES/SOFT TISSUES: Degenerative change\par \par IMPRESSION:\par \par Findings consistent with mixed response to treatment.\par \par Please see chest CT report for chest findings.\par \par \par \par \par \par \par AMANDA BAH M.D., ATTENDING RADIOLOGIST\par This document has been electronically signed. Feb 28 2020 10:25AM\par   \par \par   \par \par \par [id number]^RI^DC\par \par  EXAM:  CT CHEST IC      \par \par \par PROCEDURE DATE:  02/27/2020  \par \par \par \par \par INTERPRETATION:  Reason for study: Endometrial carcinoma.\par \par Technique: On the day of this examination, an IV cannula was placed in the radiology department.  Approximately 100 cc of Optiray 320 was administered intravenously and multiple transaxial images of the thorax was obtained.  0 cc of contrast material was discarded.\par \par Coronal and sagittal reformatted images were performed to better evaluate anatomic relationships and for possible preoperative planning.\par \par CT abdomen and pelvis  performed on the same day, see separate report.\par \par \par \par \par Comparison: CT thorax 11/22/2019.\par \par Findings:\par Tubes/lines:none\par \par Central airways/ Lung parenchyma/ pleura : No endobronchial obstruction, mass or pleural effusions are noted. There is no evidence of bronchiectasis or honeycombing.Upper lung zone predominant emphysema, unchanged. Nonspecific mosaic attenuation left lower lobe (4/137).\par \par Pulmonary nodules: No suspicious pulmonary nodules.\par \par \par \par \par Chest wall/axilla: unremarkable\par \par Mediastinum/Great vessels: No adenopathy or mass. No circumferential pericardial effusions. Stable trace left-sided pericardial fluid. Great vessels are normal in caliber.\par \par \par Osseous structures:No suspicious osseous lesions.\par \par \par Impression:\par \par No current CT evidence of thoracic metastatic disease.\par \par Stable upper lung zone emphysema.\par \par \par CT abdomen and pelvis  performed on the same day, see separate report.\par \par \par \par \par \par \par \par \par MONTRELL ESCAMILLA M.D., ATTENDING RADIOLOGIST\par This document has been electronically signed. Feb 28 2020 12:21PM\par   \par \par   \par \par \par [id number]^RI^DC

## 2020-03-24 ENCOUNTER — APPOINTMENT (OUTPATIENT)
Dept: INFUSION THERAPY | Facility: CLINIC | Age: 55
End: 2020-03-24

## 2020-04-03 ENCOUNTER — APPOINTMENT (OUTPATIENT)
Dept: GYNECOLOGIC ONCOLOGY | Facility: CLINIC | Age: 55
End: 2020-04-03

## 2020-04-03 NOTE — ASSESSMENT
[FreeTextEntry1] : 55yo stage IVb endometrial ca s/p chemo x5 cycles with mixed response,\par \par -\par \par NO SHOW

## 2020-04-03 NOTE — HISTORY OF PRESENT ILLNESS
[FreeTextEntry1] : This is a 55yo..\par \par Diagnosis: endometrioid carcinoma stage IVb\par Surgery: none\par Treatment: carbo/taxol x5 cycles\par \par Pt was first evaluated by gynonc 11/15/19 for endometrial cancer, referred to rad/onc and heme/onc for mgmt.  Pt presents today per heme/onc recommendations for reevaluation for adjuvant surgery.\par \par Pt followed up with radiation oncology on 12/13/19 who recommended primary chemotherapy.  \par \par Pt began seeing heme/onc 12/16/19, underwent carbo/taxol x5 cycles last cycle 3/23/20 which she tolerated well.  She had a CT abd/pelvis after her 3rd cycle which showed mixed response.  NGS testing sent and reviewed, pt is candidate for keytruda.\par \par  \par 2/11/20 20\par 1/21/20 30\par 12/16/19 50\par \par Imaging: CT abd pelvis: EXAM: CT ABDOMEN AND PELVIS OC IC \par PROCEDURE DATE: 02/27/2020 \par \par INTERPRETATION: CLINICAL STATEMENT: Endometrial cancer \par \par TECHNIQUE: Contiguous axial CT images were obtained from the lower chest to the pubic symphysis after 100 cc of Optiray 320 intravenous contrast. Oral contrast was given. Reformatted images in the coronal and sagittal planes were acquired.\par \par COMPARISON CT: 9/17/2018 and 10/12/2019; chest CT 11/22/2019\par \par FINDINGS:\par \par LOWER CHEST: Please see chest CT report for chest findings.\par \par HEPATOBILIARY: Unremarkable..\par \par SPLEEN: Unremarkable..\par \par PANCREAS: Unremarkable..\par \par ADRENAL GLANDS: Unremarkable..\par \par KIDNEYS: Subcentimeter hypodensities are too small to characterize. There is no hydronephrosis.\par \par ABDOMINOPELVIC NODES: There is new common iliac adenopathy on the right measuring 2.7 x 1.8 cm in image 85 of series 2. Other areas of retroperitoneal adenopathy have improved since prior examination with residual disease still present. Infiltrative findings within the upper abdomen and peritoneal compartments as seen on prior chest CT have resolved.\par \par PELVIC ORGANS: Enlarged and heterogeneous uterus appears to have decreased in size slightly since prior examination now measuring 9.6 x 12.5 x 6.8 cm and previously measured 11 x 15 x 9 cm. The area of cervical cavitation is no longer seen. However, now posterior to the ureters is diffuse soft tissue nodularity with associated fluid for example on image 100 series 2. There is also a new small pocket of loculated ascites anterior to the rectum measuring 4.4 x 3.4 cm. Uterus and adnexa are incompletely evaluated on CT.\par \par PERITONEUM/MESENTERY/BOWEL: There is no free air or obstruction.\par \par BONES/SOFT TISSUES: Degenerative change\par \par IMPRESSION:\par \par Findings consistent with mixed response to treatment.\par \par Please see chest CT report for chest findings.\par

## 2020-04-07 ENCOUNTER — APPOINTMENT (OUTPATIENT)
Dept: HEMATOLOGY ONCOLOGY | Facility: CLINIC | Age: 55
End: 2020-04-07
Payer: SELF-PAY

## 2020-04-07 ENCOUNTER — LABORATORY RESULT (OUTPATIENT)
Age: 55
End: 2020-04-07

## 2020-04-07 ENCOUNTER — APPOINTMENT (OUTPATIENT)
Dept: INFUSION THERAPY | Facility: CLINIC | Age: 55
End: 2020-04-07
Payer: SELF-PAY

## 2020-04-07 VITALS
WEIGHT: 142 LBS | BODY MASS INDEX: 26.81 KG/M2 | DIASTOLIC BLOOD PRESSURE: 69 MMHG | SYSTOLIC BLOOD PRESSURE: 130 MMHG | HEIGHT: 61 IN | HEART RATE: 91 BPM | TEMPERATURE: 97.6 F | RESPIRATION RATE: 16 BRPM

## 2020-04-07 LAB
HCT VFR BLD CALC: 36.9 %
HGB BLD-MCNC: 12.8 G/DL
MCHC RBC-ENTMCNC: 30 PG
MCHC RBC-ENTMCNC: 34.7 G/DL
MCV RBC AUTO: 86.4 FL
PLATELET # BLD AUTO: 192 K/UL
PMV BLD: 10.4 FL
RBC # BLD: 4.27 M/UL
RBC # FLD: 17.1 %
WBC # FLD AUTO: 8.6 K/UL

## 2020-04-07 PROCEDURE — 99215 OFFICE O/P EST HI 40 MIN: CPT

## 2020-04-07 RX ORDER — PEMBROLIZUMAB 25 MG/ML
200 INJECTION, SOLUTION INTRAVENOUS ONCE
Refills: 0 | Status: COMPLETED | OUTPATIENT
Start: 2020-04-07 | End: 2020-04-07

## 2020-04-07 RX ADMIN — PEMBROLIZUMAB 216 MILLIGRAM(S): 25 INJECTION, SOLUTION INTRAVENOUS at 15:21

## 2020-04-07 RX ADMIN — PEMBROLIZUMAB 200 MILLIGRAM(S): 25 INJECTION, SOLUTION INTRAVENOUS at 15:51

## 2020-04-07 NOTE — REASON FOR VISIT
[Follow-Up Visit] : a follow-up [Family Member] : family member [Patient Declined  Services] : - None: Patient declined  services [FreeTextEntry2] : FIGO IVB endometrial cancer [FreeTextEntry3] : Patient requested that son translate for her. [TWNoteComboBox1] : Micronesian

## 2020-04-07 NOTE — REVIEW OF SYSTEMS
[Dysmenorrhea/Abn Vaginal Bleeding] : dysmenorrhea/abnormal vaginal bleeding [Negative] : Heme/Lymph [de-identified] : numbness in fingers

## 2020-04-07 NOTE — PHYSICAL EXAM
[Fully active, able to carry on all pre-disease performance without restriction] : Status 0 - Fully active, able to carry on all pre-disease performance without restriction [Normal] : affect appropriate [de-identified] : mild tenderness to palpation to LLQ, normal BS. nlarged pelvic mass.

## 2020-04-07 NOTE — HISTORY OF PRESENT ILLNESS
[de-identified] : 54 yof, PMH of DM II, presented to ED in 10/2019 after experiencing worsening of chronic, intermittent pelvic pain and vaginal bleeding.  Patient underwent transvaginal ultrasound, which showed 3.5 x 2.7 x 4.4 cm cervical mass.  CT abdomen/pelvis revealed R iliac chain Ln 1.5 x 1.4 cm, along with 2.2 x 1.8 cm portacaval LNs, and enlargement of uterus.  Endometrial biopsy revealed well to moderately differentiated endometrioid cancer with focal squamous differentiation and focal clear cell changes.  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   CT chest negative for evidence of intrathoracic metastases.  Patient was initially scheduled for ALESHA/BSO on 11/25/2019.  However, MRI pelvis revealed L adnexal peritoneal tumor implants, enlarged pelvic and retroperitoneal lymph nodes, FIGO stage IVB.  ALESHA/BSO was subsequently canceled.  Patient recently saw Dr. Perez, who recommended chemotherapy alone.  Patient is currently only having occasional, mild vaginal bleeding and intermittent pelvic pain, which responds to tylenol.\par \par Family history is significant for history of uterine cancer in paternal grandmother (per patient, diagnosed at around 55 years) and "blood cancer" in maternal grandmother.\par \par Patient lives with her family and is fully functional at baseline.  She used to work as a  but is now no longer working.  She is also is in the process of applying for Medicaid. [de-identified] : 1/20/20 : The patient is here for follow up .She received 2 doses of ferraheme prior to chemo . She tolerated carbo, taxol with mild symptoms of nausea and vomiting. She lost her hair , and reported mild numbness of fingers. She reported also minimal vaginal bleed. Her abdominal pain resolved and she is able to ambulate with no problems\par \par 2/10/2020: The patient is here for follow up , she tolerated her second cycle of chemotherapy with no problems , except for mild nausea and vomiting for the first few days. She also reported some mucousy discharge from vagina. She is eating well , no abdominal pain. \par \par 3/2/2020: The patient is here for follow up . She is tolerating chemotherapy with no major side effects except for nausea in the following days.  She had a CT C/A/P after her 3rd cycle which showed mixed response. She denies any vaginal bleed, no rectal pain on defecation, no abdominal pain. \par \par 3/23/2020: The patient is here for follow up. She is tolerating chemotherapy with no major side effects . She is reporting nausea in the following days after chemotherapy. She denies fever, chills, abdominal pain , no constipation or diarrhea . No vaginal bleeding\par \par 04/07/2020\par JAY BOWMAN a 54 year F is here today for follow up of FIGO IVB endometrial cancer.\par Had  Ronnie number [id number].\par S/p 4 cycles of carbo and taxol, tolerating well. Cancelled last chemo appt due to corona virus. \par Reports lower abdominal cramping 2/10 started today. Denies vaginal bleeding today. Did have spotting x 1 since last treatment.\par Denies NVD, fever, chills. \par Pt reports peripheral neuropathy in fingers b/l not bothersome \par Had GYN f/up 3 weeks ago. \par CBC reviewed: WBC 8.6, h/H 12.8/36.9%, , Neutrophils 5.59\par \par

## 2020-04-07 NOTE — ASSESSMENT
[FreeTextEntry1] : 54 yof, PMH of DM II,  diagnosed with FIGO IVB well to moderately differentiated endometrioid cancer with focal squamous differentiation and focal clear cell changes.  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 FIGO IVB endometrial cancer\par - s/p 4 cycles of  carboplatin (AUC 6) and taxol (175 mg/m2) every 3 weeks. \par - CT C/A/P after cycle 3 reviewed and showing mixed response. Ca 125 is 20 from 2/15/2020\par - We will switch carboplatin/taxol to Keytruda.  NGS testing sent and reviewed , she is a candidate for keytruda\par Proceed with cycle # 1 of Keytruda IV: 200 mg once every 3 weeks until disease progression or unacceptable toxicity. \par \par Side effects discussed:\par Peripheral edema, cardiac arrhythmia, Fatigue, Pruritus, skin rash, Hyperglycemia, hypoalbuminemia, hyperypertriglyceridemia , hyponatremia , hypophosphatemia, hypocalcemia, decreased serum bicarbonate , hypercholesterolemia hypokalemia, hypoglycemia, hypercalcemia, hypothyroidism , hyperkalemia, Diarrhea , decreased appetite, constipation , abdominal pain , nausea, vomiting hematuria, anemia, lymphocytopenia, leukopenia, neutropenia, thrombocytopenia, hemorrhage, increased INR, prolonged partial thromboplastin time \par abnormal liver enzymes\par \par MONITORING as follows\par  liver enzymes at baseline and periodically during treatment; renal function; thyroid function (at baseline, periodically during treatment and as clinically indicated); glucose; CBC with differential;\par signs/symptoms of colitis, dermatologic toxicity, hypophysitis, thyroid disorders, pneumonitis, infusion reactions\par \par The immunotherapy dose was adjusted according to pt's height, weight, labs and anticipated tolerance.\par The high risk of complications and complexity associated with antineoplastic therapy administration has been explained to the pt and family.\par Treatment will be administered under my direct supervision\par - Repeat CMP, Ca125, TSH, Free T4  today\par -  Although pathologic findings are not suggestive of Koch Syndrome, family history is significant for history of uterine cancer in paternal grandmother (per patient, diagnosed at around 55 years), we offered  genetic testing - \par  used to explain it to the patient, but patient refused to be tested \par \par 2. Microcytic anemia-hemoglobin of 9.5 with MCV of 74.2 on 11/15/2019. secondary to iron deficiency from vaginal bleeding. s/p 2 ferraheme , her iron studies were repeated, and are WNL\par \par 3.  Low serum vitamin D level-started vitamin D2 50,000 units weekly x 8 weeks. completed, continue Vitamin d 2000 units daily\par \par 4. Follow up with GYN/ONC \par \par RTC in 3 weeks\par \par Patient was seen and examined with Dr. Willard, who agreed with the above plan of care.\par \par

## 2020-04-08 LAB
ALBUMIN SERPL ELPH-MCNC: 3.9 G/DL
ALP BLD-CCNC: 69 U/L
ALT SERPL-CCNC: 13 U/L
ANION GAP SERPL CALC-SCNC: 15 MMOL/L
AST SERPL-CCNC: 43 U/L
BILIRUB SERPL-MCNC: 0.2 MG/DL
BUN SERPL-MCNC: 9 MG/DL
CALCIUM SERPL-MCNC: 9.6 MG/DL
CANCER AG125 SERPL-ACNC: 31 U/ML
CHLORIDE SERPL-SCNC: 98 MMOL/L
CO2 SERPL-SCNC: 22 MMOL/L
CREAT SERPL-MCNC: 0.5 MG/DL
GLUCOSE SERPL-MCNC: 166 MG/DL
POTASSIUM SERPL-SCNC: 5.6 MMOL/L
PROT SERPL-MCNC: 7.8 G/DL
SODIUM SERPL-SCNC: 135 MMOL/L
T4 FREE SERPL-MCNC: 1.1 NG/DL
TSH SERPL-ACNC: 1.8 UIU/ML

## 2020-04-28 ENCOUNTER — LABORATORY RESULT (OUTPATIENT)
Age: 55
End: 2020-04-28

## 2020-04-28 ENCOUNTER — APPOINTMENT (OUTPATIENT)
Dept: HEMATOLOGY ONCOLOGY | Facility: CLINIC | Age: 55
End: 2020-04-28
Payer: SELF-PAY

## 2020-04-28 ENCOUNTER — APPOINTMENT (OUTPATIENT)
Dept: INFUSION THERAPY | Facility: CLINIC | Age: 55
End: 2020-04-28
Payer: SELF-PAY

## 2020-04-28 VITALS
DIASTOLIC BLOOD PRESSURE: 53 MMHG | RESPIRATION RATE: 18 BRPM | TEMPERATURE: 98.6 F | BODY MASS INDEX: 26.83 KG/M2 | WEIGHT: 142 LBS | HEART RATE: 73 BPM | SYSTOLIC BLOOD PRESSURE: 112 MMHG

## 2020-04-28 LAB
ALBUMIN SERPL ELPH-MCNC: 3.9 G/DL
ALP BLD-CCNC: 50 U/L
ALT SERPL-CCNC: 20 U/L
ANION GAP SERPL CALC-SCNC: 12 MMOL/L
AST SERPL-CCNC: 22 U/L
BILIRUB SERPL-MCNC: <0.2 MG/DL
BUN SERPL-MCNC: 11 MG/DL
CALCIUM SERPL-MCNC: 9.2 MG/DL
CHLORIDE SERPL-SCNC: 102 MMOL/L
CO2 SERPL-SCNC: 23 MMOL/L
CREAT SERPL-MCNC: 0.5 MG/DL
GLUCOSE SERPL-MCNC: 113 MG/DL
HCT VFR BLD CALC: 35.8 %
HGB BLD-MCNC: 12.1 G/DL
MCHC RBC-ENTMCNC: 29.9 PG
MCHC RBC-ENTMCNC: 33.8 G/DL
MCV RBC AUTO: 88.4 FL
PLATELET # BLD AUTO: 275 K/UL
PMV BLD: 10.8 FL
POTASSIUM SERPL-SCNC: 4.3 MMOL/L
PROT SERPL-MCNC: 7.6 G/DL
RBC # BLD: 4.05 M/UL
RBC # FLD: 15.9 %
SODIUM SERPL-SCNC: 137 MMOL/L
WBC # FLD AUTO: 8.23 K/UL

## 2020-04-28 PROCEDURE — 99215 OFFICE O/P EST HI 40 MIN: CPT

## 2020-04-28 RX ORDER — PEMBROLIZUMAB 25 MG/ML
200 INJECTION, SOLUTION INTRAVENOUS ONCE
Refills: 0 | Status: COMPLETED | OUTPATIENT
Start: 2020-04-28 | End: 2020-04-28

## 2020-04-28 RX ADMIN — PEMBROLIZUMAB 216 MILLIGRAM(S): 25 INJECTION, SOLUTION INTRAVENOUS at 14:31

## 2020-04-28 RX ADMIN — PEMBROLIZUMAB 200 MILLIGRAM(S): 25 INJECTION, SOLUTION INTRAVENOUS at 15:00

## 2020-04-28 NOTE — HISTORY OF PRESENT ILLNESS
[de-identified] : 54 yof, PMH of DM II, presented to ED in 10/2019 after experiencing worsening of chronic, intermittent pelvic pain and vaginal bleeding.  Patient underwent transvaginal ultrasound, which showed 3.5 x 2.7 x 4.4 cm cervical mass.  CT abdomen/pelvis revealed R iliac chain Ln 1.5 x 1.4 cm, along with 2.2 x 1.8 cm portacaval LNs, and enlargement of uterus.  Endometrial biopsy revealed well to moderately differentiated endometrioid cancer with focal squamous differentiation and focal clear cell changes.  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   CT chest negative for evidence of intrathoracic metastases.  Patient was initially scheduled for ALESHA/BSO on 11/25/2019.  However, MRI pelvis revealed L adnexal peritoneal tumor implants, enlarged pelvic and retroperitoneal lymph nodes, FIGO stage IVB.  ALESHA/BSO was subsequently canceled.  Patient recently saw Dr. Perez, who recommended chemotherapy alone.  Patient is currently only having occasional, mild vaginal bleeding and intermittent pelvic pain, which responds to tylenol.\par \par Family history is significant for history of uterine cancer in paternal grandmother (per patient, diagnosed at around 55 years) and "blood cancer" in maternal grandmother.\par \par Patient lives with her family and is fully functional at baseline.  She used to work as a  but is now no longer working.  She is also is in the process of applying for Medicaid. [de-identified] : 1/20/20 : The patient is here for follow up .She received 2 doses of ferraheme prior to chemo . She tolerated carbo, taxol with mild symptoms of nausea and vomiting. She lost her hair , and reported mild numbness of fingers. She reported also minimal vaginal bleed. Her abdominal pain resolved and she is able to ambulate with no problems\par \par 2/10/2020: The patient is here for follow up , she tolerated her second cycle of chemotherapy with no problems , except for mild nausea and vomiting for the first few days. She also reported some mucousy discharge from vagina. She is eating well , no abdominal pain. \par \par 3/2/2020: The patient is here for follow up . She is tolerating chemotherapy with no major side effects except for nausea in the following days.  She had a CT C/A/P after her 3rd cycle which showed mixed response. She denies any vaginal bleed, no rectal pain on defecation, no abdominal pain. \par \par 3/23/2020: The patient is here for follow up. She is tolerating chemotherapy with no major side effects . She is reporting nausea in the following days after chemotherapy. She denies fever, chills, abdominal pain , no constipation or diarrhea . No vaginal bleeding\par \par 04/07/2020\par JAY BOWMAN a 54 year F is here today for follow up of FIGO IVB endometrial cancer.\par Had  Ronnie number [id number].\par S/p 4 cycles of carbo and taxol, tolerating well. Cancelled last chemo appt due to corona virus. \par Reports lower abdominal cramping 2/10 started today. Denies vaginal bleeding today. Did have spotting x 1 since last treatment.\par Denies NVD, fever, chills. \par Pt reports peripheral neuropathy in fingers b/l not bothersome \par Had GYN f/up 3 weeks ago. \par CBC reviewed: WBC 8.6, h/H 12.8/36.9%, , Neutrophils 5.59\par \par 4/28/20: \par History obtained through  [facility] from [facility]. ID# [id number]\par Pt returns for a f/u vist. She is S/P 1st cycle of keytruda. She tolerated it fairly well. Abdominal pain has resolved. She continues to have mild yellowish vaginal discharge. No vaginal bleeding.\par \par

## 2020-04-28 NOTE — ASSESSMENT
[FreeTextEntry1] : 54 yof, PMH of DM II,  diagnosed with FIGO IVB well to moderately differentiated endometrioid cancer with focal squamous differentiation and focal clear cell changes.  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 FIGO IVB endometrial cancer\par - s/p 4 cycles of  carboplatin (AUC 6) and taxol (175 mg/m2) every 3 weeks. \par - CT C/A/P after cycle 3 reviewed and showing mixed response. Ca 125 is 20 from 2/15/2020\par - Was switched carboplatin/taxol to Keytruda.  NGS testing sent and reviewed.\par Proceed with cycle # 2 of Keytruda IV: 200 mg once every 3 weeks until disease progression or unacceptable toxicity. \par \par Side effects discussed:\par Peripheral edema, cardiac arrhythmia, Fatigue, Pruritus, skin rash, Hyperglycemia, hypoalbuminemia, hyperypertriglyceridemia , hyponatremia , hypophosphatemia, hypocalcemia, decreased serum bicarbonate , hypercholesterolemia hypokalemia, hypoglycemia, hypercalcemia, hypothyroidism , hyperkalemia, Diarrhea , decreased appetite, constipation , abdominal pain , nausea, vomiting hematuria, anemia, lymphocytopenia, leukopenia, neutropenia, thrombocytopenia, hemorrhage, increased INR, prolonged partial thromboplastin time \par abnormal liver enzymes\par \par MONITORING as follows\par  liver enzymes at baseline and periodically during treatment; renal function; thyroid function (at baseline, periodically during treatment and as clinically indicated); glucose; CBC with differential;\par signs/symptoms of colitis, dermatologic toxicity, hypophysitis, thyroid disorders, pneumonitis, infusion reactions\par \par The immunotherapy dose was adjusted according to pt's height, weight, labs and anticipated tolerance.\par The high risk of complications and complexity associated with antineoplastic therapy administration has been explained to the pt and family.\par Treatment will be administered under my direct supervision\par - Repeat CMP, Ca125, TSH, Free T4  today\par -  Although pathologic findings are not suggestive of Koch Syndrome, family history is significant for history of uterine cancer in paternal grandmother (per patient, diagnosed at around 55 years), we offered  genetic testing - \par  used to explain it to the patient, but patient refused to be tested \par \par 2. Microcytic anemia-hemoglobin of 9.5 with MCV of 74.2 on 11/15/2019. secondary to iron deficiency from vaginal bleeding. s/p 2 ferraheme , her iron studies were repeated, and are WNL\par \par 3.  Low serum vitamin D level-started vitamin D2 50,000 units weekly x 8 weeks. completed, continue Vitamin d 2000 units daily\par \par 4. Follow up with GYN/ONC \par \par RTC in 3 weeks\par \par Patient was seen and examined with Dr. Willard, who agreed with the above plan of care.\par \par

## 2020-04-28 NOTE — END OF VISIT
[] : Fellow [FreeTextEntry3] : I was physically present for the key portions of the evaluation and management service provided.  I agree with the history and physical, and plan which I have reviewed and edited where appropriate.\par

## 2020-04-28 NOTE — REVIEW OF SYSTEMS
[Fatigue] : fatigue [Vaginal Discharge] : vaginal discharge [Negative] : Heme/Lymph [de-identified] : numbness in fingers

## 2020-04-28 NOTE — REASON FOR VISIT
[Follow-Up Visit] : a follow-up [[employer] ] : provided by [employer]   [FreeTextEntry2] : FIGO IVB endometrial cancer [FreeTextEntry1] : [id number] [TWNoteComboBox1] : Trinidadian

## 2020-04-29 LAB — TSH SERPL-ACNC: 1.66 UIU/ML

## 2020-05-19 ENCOUNTER — LABORATORY RESULT (OUTPATIENT)
Age: 55
End: 2020-05-19

## 2020-05-19 ENCOUNTER — APPOINTMENT (OUTPATIENT)
Dept: HEMATOLOGY ONCOLOGY | Facility: CLINIC | Age: 55
End: 2020-05-19
Payer: SELF-PAY

## 2020-05-19 ENCOUNTER — APPOINTMENT (OUTPATIENT)
Dept: INFUSION THERAPY | Facility: CLINIC | Age: 55
End: 2020-05-19
Payer: SELF-PAY

## 2020-05-19 VITALS
DIASTOLIC BLOOD PRESSURE: 70 MMHG | BODY MASS INDEX: 27.19 KG/M2 | WEIGHT: 144 LBS | TEMPERATURE: 96.5 F | HEART RATE: 69 BPM | SYSTOLIC BLOOD PRESSURE: 115 MMHG | RESPIRATION RATE: 14 BRPM | HEIGHT: 61 IN

## 2020-05-19 PROCEDURE — 99215 OFFICE O/P EST HI 40 MIN: CPT

## 2020-05-19 RX ORDER — PEMBROLIZUMAB 25 MG/ML
200 INJECTION, SOLUTION INTRAVENOUS ONCE
Refills: 0 | Status: COMPLETED | OUTPATIENT
Start: 2020-05-19 | End: 2020-05-19

## 2020-05-19 RX ADMIN — PEMBROLIZUMAB 216 MILLIGRAM(S): 25 INJECTION, SOLUTION INTRAVENOUS at 15:56

## 2020-05-19 RX ADMIN — PEMBROLIZUMAB 200 MILLIGRAM(S): 25 INJECTION, SOLUTION INTRAVENOUS at 16:26

## 2020-05-20 LAB
ALBUMIN SERPL ELPH-MCNC: 4.6 G/DL
ALP BLD-CCNC: 58 U/L
ALT SERPL-CCNC: 22 U/L
ANION GAP SERPL CALC-SCNC: 17 MMOL/L
AST SERPL-CCNC: 48 U/L
BILIRUB SERPL-MCNC: 0.2 MG/DL
BUN SERPL-MCNC: 12 MG/DL
CALCIUM SERPL-MCNC: 9.6 MG/DL
CANCER AG125 SERPL-ACNC: 68 U/ML
CHLORIDE SERPL-SCNC: 102 MMOL/L
CO2 SERPL-SCNC: 22 MMOL/L
CREAT SERPL-MCNC: 0.5 MG/DL
GLUCOSE SERPL-MCNC: 92 MG/DL
HCT VFR BLD CALC: 39.6 %
HGB BLD-MCNC: 13.4 G/DL
MCHC RBC-ENTMCNC: 29.6 PG
MCHC RBC-ENTMCNC: 33.8 G/DL
MCV RBC AUTO: 87.6 FL
PLATELET # BLD AUTO: 215 K/UL
PMV BLD: 11.3 FL
POTASSIUM SERPL-SCNC: 5.4 MMOL/L
PROT SERPL-MCNC: 8.9 G/DL
RBC # BLD: 4.52 M/UL
RBC # FLD: 14.6 %
SODIUM SERPL-SCNC: 141 MMOL/L
TSH SERPL-ACNC: 2.03 UIU/ML
WBC # FLD AUTO: 9.8 K/UL

## 2020-05-22 NOTE — HISTORY OF PRESENT ILLNESS
[de-identified] : 54 yof, PMH of DM II, presented to ED in 10/2019 after experiencing worsening of chronic, intermittent pelvic pain and vaginal bleeding.  Patient underwent transvaginal ultrasound, which showed 3.5 x 2.7 x 4.4 cm cervical mass.  CT abdomen/pelvis revealed R iliac chain Ln 1.5 x 1.4 cm, along with 2.2 x 1.8 cm portacaval LNs, and enlargement of uterus.  Endometrial biopsy revealed well to moderately differentiated endometrioid cancer with focal squamous differentiation and focal clear cell changes.  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   CT chest negative for evidence of intrathoracic metastases.  Patient was initially scheduled for ALESHA/BSO on 11/25/2019.  However, MRI pelvis revealed L adnexal peritoneal tumor implants, enlarged pelvic and retroperitoneal lymph nodes, FIGO stage IVB.  ALESHA/BSO was subsequently canceled.  Patient recently saw Dr. Perez, who recommended chemotherapy alone.  Patient is currently only having occasional, mild vaginal bleeding and intermittent pelvic pain, which responds to tylenol.\par \par Family history is significant for history of uterine cancer in paternal grandmother (per patient, diagnosed at around 55 years) and "blood cancer" in maternal grandmother.\par \par Patient lives with her family and is fully functional at baseline.  She used to work as a  but is now no longer working.  She is also is in the process of applying for Medicaid. [de-identified] : 1/20/20 : The patient is here for follow up .She received 2 doses of ferraheme prior to chemo . She tolerated carbo, taxol with mild symptoms of nausea and vomiting. She lost her hair , and reported mild numbness of fingers. She reported also minimal vaginal bleed. Her abdominal pain resolved and she is able to ambulate with no problems\par \par 2/10/2020: The patient is here for follow up , she tolerated her second cycle of chemotherapy with no problems , except for mild nausea and vomiting for the first few days. She also reported some mucousy discharge from vagina. She is eating well , no abdominal pain. \par \par 3/2/2020: The patient is here for follow up . She is tolerating chemotherapy with no major side effects except for nausea in the following days.  She had a CT C/A/P after her 3rd cycle which showed mixed response. She denies any vaginal bleed, no rectal pain on defecation, no abdominal pain. \par \par 3/23/2020: The patient is here for follow up. She is tolerating chemotherapy with no major side effects . She is reporting nausea in the following days after chemotherapy. She denies fever, chills, abdominal pain , no constipation or diarrhea . No vaginal bleeding\par \par 04/07/2020\par JAY BOWMAN a 54 year F is here today for follow up of FIGO IVB endometrial cancer.\par Had  Ronnie number [id number].\par S/p 4 cycles of carbo and taxol, tolerating well. Cancelled last chemo appt due to corona virus. \par Reports lower abdominal cramping 2/10 started today. Denies vaginal bleeding today. Did have spotting x 1 since last treatment.\par Denies NVD, fever, chills. \par Pt reports peripheral neuropathy in fingers b/l not bothersome \par Had GYN f/up 3 weeks ago. \par CBC reviewed: WBC 8.6, h/H 12.8/36.9%, , Neutrophils 5.59\par \par 4/28/20: \par History obtained through  Manish from [employer]. ID# [id number]\par Pt returns for a f/u vist. She is S/P 1st cycle of keytruda. She tolerated it fairly well. Abdominal pain has resolved. She continues to have mild yellowish vaginal discharge. No vaginal bleeding.\par \par 05/19/2020\par JAY BOWMAN a 54 year F is here today with son for follow up of FIGO IVB endometrial cancer. \par Pt prefers her son translates today's visit. \par Pt denies abdominal pain, vaginal bleeding, NVD, fever, chills. \par She is s/p 2nd cycle of Keytruda, tolerated well. \par  : 31\par \par

## 2020-05-22 NOTE — REVIEW OF SYSTEMS
[Fatigue] : fatigue [Vaginal Discharge] : vaginal discharge [Negative] : Heme/Lymph [de-identified] : numbness in fingers

## 2020-05-22 NOTE — ASSESSMENT
[FreeTextEntry1] : 54 yof, PMH of DM II,  diagnosed with FIGO IVB well to moderately differentiated endometrioid cancer with focal squamous differentiation and focal clear cell changes.  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 FIGO IVB endometrial cancer\par - s/p 4 cycles of  carboplatin (AUC 6) and taxol (175 mg/m2) every 3 weeks. \par - CT C/A/P after cycle 3 reviewed and showing mixed response. Ca 125 is 20 from 2/15/2020\par - Was switched carboplatin/taxol to Keytruda.  NGS testing sent and reviewed.\par Proceed with cycle # 3 of Keytruda IV: 200 mg once every 3 weeks until disease progression or unacceptable toxicity. \par - Will repeat CT CAP  trending up. Labs ordered CBC, , CMP, TSH\par \par Side effects discussed:\par Peripheral edema, cardiac arrhythmia, Fatigue, Pruritus, skin rash, Hyperglycemia, hypoalbuminemia, hyperypertriglyceridemia , hyponatremia , hypophosphatemia, hypocalcemia, decreased serum bicarbonate , hypercholesterolemia hypokalemia, hypoglycemia, hypercalcemia, hypothyroidism , hyperkalemia, Diarrhea , decreased appetite, constipation , abdominal pain , nausea, vomiting hematuria, anemia, lymphocytopenia, leukopenia, neutropenia, thrombocytopenia, hemorrhage, increased INR, prolonged partial thromboplastin time \par abnormal liver enzymes\par \par MONITORING as follows\par  liver enzymes at baseline and periodically during treatment; renal function; thyroid function (at baseline, periodically during treatment and as clinically indicated); glucose; CBC with differential;\par signs/symptoms of colitis, dermatologic toxicity, hypophysitis, thyroid disorders, pneumonitis, infusion reactions\par \par The immunotherapy dose was adjusted according to pt's height, weight, labs and anticipated tolerance.\par The high risk of complications and complexity associated with antineoplastic therapy administration has been explained to the pt and family.\par Treatment will be administered under my direct supervision\par -  Although pathologic findings are not suggestive of Koch Syndrome, family history is significant for history of uterine cancer in paternal grandmother (per patient, diagnosed at around 55 years), we offered  genetic testing - \par  used to explain it to the patient, but patient refused to be tested \par \par 2. Microcytic anemia-hemoglobin of 9.5 with MCV of 74.2 on 11/15/2019. secondary to iron deficiency from vaginal bleeding. s/p 2 Feraheme , her iron studies were repeated, and are WNL\par \par 3.  Low serum vitamin D level-started vitamin D2 50,000 units weekly x 8 weeks. completed, continue Vitamin d 2000 units daily\par \par 4. Follow up with GYN/ONC \par \par Pt and son aware she needs COVID testing 2 days prior to next keytruda. \par \par RTC in 3 weeks\par \par Patient was seen and examined with Dr. Willard, who agreed with the above plan of care.\par \par 
denies pain/discomfort

## 2020-05-22 NOTE — REASON FOR VISIT
[Follow-Up Visit] : a follow-up [[employer] ] : provided by [employer]   [FreeTextEntry1] : [id number] [FreeTextEntry2] : Manish [TWNoteComboBox1] : Gabonese

## 2020-05-29 ENCOUNTER — OUTPATIENT (OUTPATIENT)
Dept: OUTPATIENT SERVICES | Facility: HOSPITAL | Age: 55
LOS: 1 days | Discharge: HOME | End: 2020-05-29
Payer: SUBSIDIZED

## 2020-05-29 ENCOUNTER — RESULT REVIEW (OUTPATIENT)
Age: 55
End: 2020-05-29

## 2020-05-29 DIAGNOSIS — C54.1 MALIGNANT NEOPLASM OF ENDOMETRIUM: ICD-10-CM

## 2020-05-29 PROCEDURE — 71260 CT THORAX DX C+: CPT | Mod: 26

## 2020-05-29 PROCEDURE — 74177 CT ABD & PELVIS W/CONTRAST: CPT | Mod: 26

## 2020-06-04 ENCOUNTER — LABORATORY RESULT (OUTPATIENT)
Age: 55
End: 2020-06-04

## 2020-06-05 ENCOUNTER — OUTPATIENT (OUTPATIENT)
Dept: OUTPATIENT SERVICES | Facility: HOSPITAL | Age: 55
LOS: 1 days | Discharge: HOME | End: 2020-06-05

## 2020-06-05 ENCOUNTER — APPOINTMENT (OUTPATIENT)
Dept: HEMATOLOGY ONCOLOGY | Facility: CLINIC | Age: 55
End: 2020-06-05

## 2020-06-09 ENCOUNTER — APPOINTMENT (OUTPATIENT)
Dept: INFUSION THERAPY | Facility: CLINIC | Age: 55
End: 2020-06-09
Payer: SELF-PAY

## 2020-06-09 ENCOUNTER — APPOINTMENT (OUTPATIENT)
Dept: HEMATOLOGY ONCOLOGY | Facility: CLINIC | Age: 55
End: 2020-06-09
Payer: SELF-PAY

## 2020-06-09 ENCOUNTER — LABORATORY RESULT (OUTPATIENT)
Age: 55
End: 2020-06-09

## 2020-06-09 VITALS
HEIGHT: 61 IN | TEMPERATURE: 96.8 F | HEART RATE: 71 BPM | BODY MASS INDEX: 27.94 KG/M2 | SYSTOLIC BLOOD PRESSURE: 104 MMHG | DIASTOLIC BLOOD PRESSURE: 74 MMHG | WEIGHT: 148 LBS

## 2020-06-09 LAB
HCT VFR BLD CALC: 39.5 %
HGB BLD-MCNC: 13.5 G/DL
MCHC RBC-ENTMCNC: 29.9 PG
MCHC RBC-ENTMCNC: 34.2 G/DL
MCV RBC AUTO: 87.6 FL
PLATELET # BLD AUTO: 186 K/UL
PMV BLD: 11.7 FL
RBC # BLD: 4.51 M/UL
RBC # FLD: 13.9 %
WBC # FLD AUTO: 8.27 K/UL

## 2020-06-09 PROCEDURE — 99215 OFFICE O/P EST HI 40 MIN: CPT

## 2020-06-09 RX ORDER — PEMBROLIZUMAB 25 MG/ML
200 INJECTION, SOLUTION INTRAVENOUS ONCE
Refills: 0 | Status: COMPLETED | OUTPATIENT
Start: 2020-06-09 | End: 2020-06-09

## 2020-06-09 RX ADMIN — PEMBROLIZUMAB 200 MILLIGRAM(S): 25 INJECTION, SOLUTION INTRAVENOUS at 14:50

## 2020-06-09 RX ADMIN — PEMBROLIZUMAB 216 MILLIGRAM(S): 25 INJECTION, SOLUTION INTRAVENOUS at 14:19

## 2020-06-10 LAB
ALBUMIN SERPL ELPH-MCNC: 4.3 G/DL
ALP BLD-CCNC: 58 U/L
ALT SERPL-CCNC: 18 U/L
ANION GAP SERPL CALC-SCNC: 11 MMOL/L
AST SERPL-CCNC: 18 U/L
BILIRUB SERPL-MCNC: 0.2 MG/DL
BUN SERPL-MCNC: 12 MG/DL
CALCIUM SERPL-MCNC: 9.2 MG/DL
CANCER AG125 SERPL-ACNC: 29 U/ML
CHLORIDE SERPL-SCNC: 99 MMOL/L
CO2 SERPL-SCNC: 25 MMOL/L
CREAT SERPL-MCNC: 0.6 MG/DL
GLUCOSE SERPL-MCNC: 158 MG/DL
POTASSIUM SERPL-SCNC: 4.2 MMOL/L
PROT SERPL-MCNC: 8 G/DL
SODIUM SERPL-SCNC: 135 MMOL/L
TSH SERPL-ACNC: 1.73 UIU/ML

## 2020-06-10 NOTE — REASON FOR VISIT
[Follow-Up Visit] : a follow-up [[employer] ] : provided by [employer]   [FreeTextEntry1] : [id number] [FreeTextEntry2] : Manish [TWNoteComboBox1] : Bahamian

## 2020-06-10 NOTE — HISTORY OF PRESENT ILLNESS
[de-identified] : 54 yof, PMH of DM II, presented to ED in 10/2019 after experiencing worsening of chronic, intermittent pelvic pain and vaginal bleeding.  Patient underwent transvaginal ultrasound, which showed 3.5 x 2.7 x 4.4 cm cervical mass.  CT abdomen/pelvis revealed R iliac chain Ln 1.5 x 1.4 cm, along with 2.2 x 1.8 cm portacaval LNs, and enlargement of uterus.  Endometrial biopsy revealed well to moderately differentiated endometrioid cancer with focal squamous differentiation and focal clear cell changes.  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   CT chest negative for evidence of intrathoracic metastases.  Patient was initially scheduled for ALESHA/BSO on 11/25/2019.  However, MRI pelvis revealed L adnexal peritoneal tumor implants, enlarged pelvic and retroperitoneal lymph nodes, FIGO stage IVB.  ALESHA/BSO was subsequently canceled.  Patient recently saw Dr. Perez, who recommended chemotherapy alone.  Patient is currently only having occasional, mild vaginal bleeding and intermittent pelvic pain, which responds to tylenol.\par \par Family history is significant for history of uterine cancer in paternal grandmother (per patient, diagnosed at around 55 years) and "blood cancer" in maternal grandmother.\par \par Patient lives with her family and is fully functional at baseline.  She used to work as a  but is now no longer working.  She is also is in the process of applying for Medicaid. [de-identified] : 1/20/20 : The patient is here for follow up .She received 2 doses of ferraheme prior to chemo . She tolerated carbo, taxol with mild symptoms of nausea and vomiting. She lost her hair , and reported mild numbness of fingers. She reported also minimal vaginal bleed. Her abdominal pain resolved and she is able to ambulate with no problems\par \par 2/10/2020: The patient is here for follow up , she tolerated her second cycle of chemotherapy with no problems , except for mild nausea and vomiting for the first few days. She also reported some mucousy discharge from vagina. She is eating well , no abdominal pain. \par \par 3/2/2020: The patient is here for follow up . She is tolerating chemotherapy with no major side effects except for nausea in the following days.  She had a CT C/A/P after her 3rd cycle which showed mixed response. She denies any vaginal bleed, no rectal pain on defecation, no abdominal pain. \par \par 3/23/2020: The patient is here for follow up. She is tolerating chemotherapy with no major side effects . She is reporting nausea in the following days after chemotherapy. She denies fever, chills, abdominal pain , no constipation or diarrhea . No vaginal bleeding\par \par 04/07/2020\par JAY BOWMAN a 54 year F is here today for follow up of FIGO IVB endometrial cancer.\par Had  Ronnie number [id number].\par S/p 4 cycles of carbo and taxol, tolerating well. Cancelled last chemo appt due to corona virus. \par Reports lower abdominal cramping 2/10 started today. Denies vaginal bleeding today. Did have spotting x 1 since last treatment.\par Denies NVD, fever, chills. \par Pt reports peripheral neuropathy in fingers b/l not bothersome \par Had GYN f/up 3 weeks ago. \par CBC reviewed: WBC 8.6, h/H 12.8/36.9%, , Neutrophils 5.59\par \par 4/28/20: \par History obtained through  Manish from [employer]. ID# [id number]\par Pt returns for a f/u vist. She is S/P 1st cycle of keytruda. She tolerated it fairly well. Abdominal pain has resolved. She continues to have mild yellowish vaginal discharge. No vaginal bleeding.\par \par 05/19/2020\par JAY BOWMAN a 54 year F is here today with son for follow up of FIGO IVB endometrial cancer. \par Pt prefers her son translates today's visit. \par Pt denies abdominal pain, vaginal bleeding, NVD, fever, chills. \par She is s/p 2nd cycle of Keytruda, tolerated well. \par  : 31\par \par 6/9/20\par Pt is here for follow up.\par No new complaints.\par Feels well. Denies any abd pain, nausea, vomiting, rash or fever.\par Has mild vag spotting

## 2020-06-10 NOTE — ASSESSMENT
[FreeTextEntry1] : 54 yof, PMH of DM II,  diagnosed with FIGO IVB well to moderately differentiated endometrioid cancer with focal squamous differentiation and focal clear cell changes.  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 FIGO IVB endometrial cancer\par - s/p 4 cycles of  carboplatin (AUC 6) and taxol (175 mg/m2) every 3 weeks. \par - CT C/A/P after cycle 3 reviewed and showing mixed response. Ca 125 is 20 from 2/15/2020\par - Was switched from carboplatin/taxol to Keytruda.  NGS testing sent and reviewed.\par \par Ct scan shows mixed response, pt is clinically stable, will continue on immunotherapy with repeat assessment in 2 months\par Proceed with cycle # 4 of Keytruda IV: 200 mg once every 3 weeks until disease progression or unacceptable toxicity. \par - Results of  CT CAP discussed.. Labs ordered CBC, , CMP, TSH\par \par Side effects discussed:\par Peripheral edema, cardiac arrhythmia, Fatigue, Pruritus, skin rash, Hyperglycemia, hypoalbuminemia, hyperypertriglyceridemia , hyponatremia , hypophosphatemia, hypocalcemia, decreased serum bicarbonate , hypercholesterolemia hypokalemia, hypoglycemia, hypercalcemia, hypothyroidism , hyperkalemia, Diarrhea , decreased appetite, constipation , abdominal pain , nausea, vomiting hematuria, anemia, lymphocytopenia, leukopenia, neutropenia, thrombocytopenia, hemorrhage, increased INR, prolonged partial thromboplastin time \par abnormal liver enzymes\par \par MONITORING as follows\par  liver enzymes at baseline and periodically during treatment; renal function; thyroid function (at baseline, periodically during treatment and as clinically indicated); glucose; CBC with differential;\par signs/symptoms of colitis, dermatologic toxicity, hypophysitis, thyroid disorders, pneumonitis, infusion reactions\par \par The immunotherapy dose was adjusted according to pt's height, weight, labs and anticipated tolerance.\par The high risk of complications and complexity associated with antineoplastic therapy administration has been explained to the pt and family.\par Treatment will be administered under my direct supervision\par -  Although pathologic findings are not suggestive of Koch Syndrome, family history is significant for history of uterine cancer in paternal grandmother (per patient, diagnosed at around 55 years), we offered  genetic testing - \par  used to explain it to the patient, but patient refused to be tested \par \par 2. Microcytic anemia-hemoglobin of 9.5 with MCV of 74.2 on 11/15/2019. secondary to iron deficiency from vaginal bleeding. s/p 2 Feraheme , her iron studies were repeated, and are WNL\par \par 3.  Low serum vitamin D level-started vitamin D2 50,000 units weekly x 8 weeks. completed, continue Vitamin d 2000 units daily\par \par 4. Follow up with GYN/ONC \par \par Pt and son aware she needs COVID testing 2 days prior to next keytruda. \par Ct results d/w pt's son and plan also discussed\par \par RTC in 3 weeks\par \par \par

## 2020-06-10 NOTE — REVIEW OF SYSTEMS
[Fatigue] : fatigue [Vaginal Discharge] : vaginal discharge [Negative] : Heme/Lymph [de-identified] : numbness in fingers

## 2020-06-16 DIAGNOSIS — Z11.59 ENCOUNTER FOR SCREENING FOR OTHER VIRAL DISEASES: ICD-10-CM

## 2020-06-27 ENCOUNTER — LABORATORY RESULT (OUTPATIENT)
Age: 55
End: 2020-06-27

## 2020-06-27 ENCOUNTER — OUTPATIENT (OUTPATIENT)
Dept: OUTPATIENT SERVICES | Facility: HOSPITAL | Age: 55
LOS: 1 days | Discharge: HOME | End: 2020-06-27

## 2020-06-27 DIAGNOSIS — Z11.59 ENCOUNTER FOR SCREENING FOR OTHER VIRAL DISEASES: ICD-10-CM

## 2020-06-30 ENCOUNTER — LABORATORY RESULT (OUTPATIENT)
Age: 55
End: 2020-06-30

## 2020-06-30 ENCOUNTER — APPOINTMENT (OUTPATIENT)
Dept: INFUSION THERAPY | Facility: CLINIC | Age: 55
End: 2020-06-30
Payer: SELF-PAY

## 2020-06-30 ENCOUNTER — APPOINTMENT (OUTPATIENT)
Dept: HEMATOLOGY ONCOLOGY | Facility: CLINIC | Age: 55
End: 2020-06-30
Payer: SELF-PAY

## 2020-06-30 VITALS
WEIGHT: 145 LBS | BODY MASS INDEX: 27.38 KG/M2 | DIASTOLIC BLOOD PRESSURE: 63 MMHG | SYSTOLIC BLOOD PRESSURE: 121 MMHG | HEIGHT: 61 IN | HEART RATE: 66 BPM | TEMPERATURE: 97.5 F

## 2020-06-30 LAB
HCT VFR BLD CALC: 39.2 %
HGB BLD-MCNC: 13.5 G/DL
MCHC RBC-ENTMCNC: 30 PG
MCHC RBC-ENTMCNC: 34.4 G/DL
MCV RBC AUTO: 87.1 FL
PLATELET # BLD AUTO: 209 K/UL
PMV BLD: 11.5 FL
RBC # BLD: 4.5 M/UL
RBC # FLD: 13.8 %
WBC # FLD AUTO: 9.52 K/UL

## 2020-06-30 PROCEDURE — 99215 OFFICE O/P EST HI 40 MIN: CPT

## 2020-06-30 RX ORDER — PEMBROLIZUMAB 25 MG/ML
200 INJECTION, SOLUTION INTRAVENOUS ONCE
Refills: 0 | Status: COMPLETED | OUTPATIENT
Start: 2020-06-30 | End: 2020-06-30

## 2020-06-30 RX ADMIN — PEMBROLIZUMAB 200 MILLIGRAM(S): 25 INJECTION, SOLUTION INTRAVENOUS at 14:15

## 2020-06-30 RX ADMIN — PEMBROLIZUMAB 216 MILLIGRAM(S): 25 INJECTION, SOLUTION INTRAVENOUS at 13:36

## 2020-06-30 NOTE — ASSESSMENT
[FreeTextEntry1] : 54 yof, PMH of DM II,  diagnosed with FIGO IVB well to moderately differentiated endometrioid cancer with focal squamous differentiation and focal clear cell changes.  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 FIGO IVB endometrial cancer\par - s/p 4 cycles of  carboplatin (AUC 6) and taxol (175 mg/m2) every 3 weeks. \par - CT C/A/P after cycle 3 reviewed and showing mixed response. Ca 125 is 20 from 2/15/2020\par - Was switched from carboplatin/taxol to Keytruda.  NGS testing sent and reviewed.\par \par Ct scan shows mixed response, pt is clinically stable, will continue on immunotherapy with repeat assessment in 2 months\par Proceed with cycle # 5 of Keytruda IV: 200 mg once every 3 weeks until disease progression or unacceptable toxicity. \par - Labs ordered CBC, CMP, , TSH\par \par Side effects discussed:\par Peripheral edema, cardiac arrhythmia, Fatigue, Pruritus, skin rash, Hyperglycemia, hypoalbuminemia, hyperypertriglyceridemia , hyponatremia , hypophosphatemia, hypocalcemia, decreased serum bicarbonate , hypercholesterolemia hypokalemia, hypoglycemia, hypercalcemia, hypothyroidism , hyperkalemia, Diarrhea , decreased appetite, constipation , abdominal pain , nausea, vomiting hematuria, anemia, lymphocytopenia, leukopenia, neutropenia, thrombocytopenia, hemorrhage, increased INR, prolonged partial thromboplastin time \par abnormal liver enzymes\par \par MONITORING as follows\par  liver enzymes at baseline and periodically during treatment; renal function; thyroid function (at baseline, periodically during treatment and as clinically indicated); glucose; CBC with differential;\par signs/symptoms of colitis, dermatologic toxicity, hypophysitis, thyroid disorders, pneumonitis, infusion reactions\par \par The immunotherapy dose was adjusted according to pt's height, weight, labs and anticipated tolerance.\par The high risk of complications and complexity associated with antineoplastic therapy administration has been explained to the pt and family.\par Treatment will be administered under my direct supervision\par -  Although pathologic findings are not suggestive of Koch Syndrome, family history is significant for history of uterine cancer in paternal grandmother (per patient, diagnosed at around 55 years), we offered  genetic testing - \par  used to explain it to the patient, but patient refused to be tested \par \par 2. Microcytic anemia-hemoglobin of 9.5 with MCV of 74.2 on 11/15/2019. secondary to iron deficiency from vaginal bleeding. s/p 2 Feraheme , her iron studies were repeated, and are WNL\par \par 3.  Low serum vitamin D level-started vitamin D2 50,000 units weekly x 8 weeks. completed, continue Vitamin d 2000 units daily\par \par 4. Follow up with GYN/ONC \par \par Pt and son aware she needs COVID testing 2 days prior to next keytruda. \par Ct results d/w pt's son and plan also discussed\par \par RTC in 3 weeks\par \par Case was seen and discussed with Dr. Willard who agreed with the assessment and plan.\par \par

## 2020-06-30 NOTE — REASON FOR VISIT
[Follow-Up Visit] : a follow-up [[employer] ] : provided by [employer]   [FreeTextEntry1] : [id number] [FreeTextEntry2] : Manish [TWNoteComboBox1] : Mauritanian

## 2020-06-30 NOTE — HISTORY OF PRESENT ILLNESS
[de-identified] : 54 yof, PMH of DM II, presented to ED in 10/2019 after experiencing worsening of chronic, intermittent pelvic pain and vaginal bleeding.  Patient underwent transvaginal ultrasound, which showed 3.5 x 2.7 x 4.4 cm cervical mass.  CT abdomen/pelvis revealed R iliac chain Ln 1.5 x 1.4 cm, along with 2.2 x 1.8 cm portacaval LNs, and enlargement of uterus.  Endometrial biopsy revealed well to moderately differentiated endometrioid cancer with focal squamous differentiation and focal clear cell changes.  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   CT chest negative for evidence of intrathoracic metastases.  Patient was initially scheduled for ALESHA/BSO on 11/25/2019.  However, MRI pelvis revealed L adnexal peritoneal tumor implants, enlarged pelvic and retroperitoneal lymph nodes, FIGO stage IVB.  ALESHA/BSO was subsequently canceled.  Patient recently saw Dr. Perez, who recommended chemotherapy alone.  Patient is currently only having occasional, mild vaginal bleeding and intermittent pelvic pain, which responds to tylenol.\par \par Family history is significant for history of uterine cancer in paternal grandmother (per patient, diagnosed at around 55 years) and "blood cancer" in maternal grandmother.\par \par Patient lives with her family and is fully functional at baseline.  She used to work as a  but is now no longer working.  She is also is in the process of applying for Medicaid. [de-identified] : 1/20/20 : The patient is here for follow up .She received 2 doses of ferraheme prior to chemo . She tolerated carbo, taxol with mild symptoms of nausea and vomiting. She lost her hair , and reported mild numbness of fingers. She reported also minimal vaginal bleed. Her abdominal pain resolved and she is able to ambulate with no problems\par \par 2/10/2020: The patient is here for follow up , she tolerated her second cycle of chemotherapy with no problems , except for mild nausea and vomiting for the first few days. She also reported some mucousy discharge from vagina. She is eating well , no abdominal pain. \par \par 3/2/2020: The patient is here for follow up . She is tolerating chemotherapy with no major side effects except for nausea in the following days.  She had a CT C/A/P after her 3rd cycle which showed mixed response. She denies any vaginal bleed, no rectal pain on defecation, no abdominal pain. \par \par 3/23/2020: The patient is here for follow up. She is tolerating chemotherapy with no major side effects . She is reporting nausea in the following days after chemotherapy. She denies fever, chills, abdominal pain , no constipation or diarrhea . No vaginal bleeding\par \par 04/07/2020\par JAY BOWMAN a 54 year F is here today for follow up of FIGO IVB endometrial cancer.\par Had  Ronnie number [id number].\par S/p 4 cycles of carbo and taxol, tolerating well. Cancelled last chemo appt due to corona virus. \par Reports lower abdominal cramping 2/10 started today. Denies vaginal bleeding today. Did have spotting x 1 since last treatment.\par Denies NVD, fever, chills. \par Pt reports peripheral neuropathy in fingers b/l not bothersome \par Had GYN f/up 3 weeks ago. \par CBC reviewed: WBC 8.6, h/H 12.8/36.9%, , Neutrophils 5.59\par \par 4/28/20: \par History obtained through  Manish from [employer]. ID# [id number]\par Pt returns for a f/u vist. She is S/P 1st cycle of keytruda. She tolerated it fairly well. Abdominal pain has resolved. She continues to have mild yellowish vaginal discharge. No vaginal bleeding.\par \par 05/19/2020\par JAY BOWMAN a 54 year F is here today with son for follow up of FIGO IVB endometrial cancer. \par Pt prefers her son translates today's visit. \par Pt denies abdominal pain, vaginal bleeding, NVD, fever, chills. \par She is s/p 2nd cycle of Keytruda, tolerated well. \par  : 31\par \par 6/9/20\par Pt is here for follow up.\par No new complaints.\par Feels well. Denies any abd pain, nausea, vomiting, rash or fever.\par Has mild vag spotting\par \par 06/30/2020\par JAY BOWMAN a 54 year F is here today for follow up of FIGO IVB endometrial cancer. \par S/p 4 cycles of Keytruda.\par She feels well. Denies any new complaints.\par Denies fever, chills, SOB, vaginal bleeding, abdominal pain, distention. \par Last : 29\par CBC reviewed: H/H 13.5/39.2%.\par

## 2020-06-30 NOTE — REVIEW OF SYSTEMS
[Vaginal Discharge] : vaginal discharge [Fatigue] : fatigue [Negative] : Constitutional [de-identified] : numbness in fingers

## 2020-07-01 LAB
ALBUMIN SERPL ELPH-MCNC: 4.5 G/DL
ALP BLD-CCNC: 61 U/L
ALT SERPL-CCNC: 15 U/L
ANION GAP SERPL CALC-SCNC: 14 MMOL/L
AST SERPL-CCNC: 18 U/L
BILIRUB SERPL-MCNC: 0.2 MG/DL
BUN SERPL-MCNC: 10 MG/DL
CALCIUM SERPL-MCNC: 9.7 MG/DL
CANCER AG125 SERPL-ACNC: 35 U/ML
CHLORIDE SERPL-SCNC: 100 MMOL/L
CO2 SERPL-SCNC: 26 MMOL/L
CREAT SERPL-MCNC: 0.5 MG/DL
GLUCOSE SERPL-MCNC: 130 MG/DL
POTASSIUM SERPL-SCNC: 4.3 MMOL/L
PROT SERPL-MCNC: 8.2 G/DL
SODIUM SERPL-SCNC: 140 MMOL/L
TSH SERPL-ACNC: 1.39 UIU/ML

## 2020-07-17 ENCOUNTER — OUTPATIENT (OUTPATIENT)
Dept: OUTPATIENT SERVICES | Facility: HOSPITAL | Age: 55
LOS: 1 days | Discharge: HOME | End: 2020-07-17

## 2020-07-17 ENCOUNTER — LABORATORY RESULT (OUTPATIENT)
Age: 55
End: 2020-07-17

## 2020-07-17 DIAGNOSIS — Z11.59 ENCOUNTER FOR SCREENING FOR OTHER VIRAL DISEASES: ICD-10-CM

## 2020-07-21 ENCOUNTER — LABORATORY RESULT (OUTPATIENT)
Age: 55
End: 2020-07-21

## 2020-07-21 ENCOUNTER — APPOINTMENT (OUTPATIENT)
Dept: HEMATOLOGY ONCOLOGY | Facility: CLINIC | Age: 55
End: 2020-07-21
Payer: COMMERCIAL

## 2020-07-21 ENCOUNTER — APPOINTMENT (OUTPATIENT)
Dept: INFUSION THERAPY | Facility: CLINIC | Age: 55
End: 2020-07-21
Payer: COMMERCIAL

## 2020-07-21 ENCOUNTER — OUTPATIENT (OUTPATIENT)
Dept: OUTPATIENT SERVICES | Facility: HOSPITAL | Age: 55
LOS: 1 days | Discharge: HOME | End: 2020-07-21

## 2020-07-21 VITALS
RESPIRATION RATE: 18 BRPM | BODY MASS INDEX: 27.56 KG/M2 | HEART RATE: 57 BPM | DIASTOLIC BLOOD PRESSURE: 69 MMHG | HEIGHT: 61 IN | WEIGHT: 146 LBS | SYSTOLIC BLOOD PRESSURE: 117 MMHG | TEMPERATURE: 98 F

## 2020-07-21 DIAGNOSIS — C54.1 MALIGNANT NEOPLASM OF ENDOMETRIUM: ICD-10-CM

## 2020-07-21 LAB
HCT VFR BLD CALC: 40.2 %
HGB BLD-MCNC: 13.8 G/DL
MCHC RBC-ENTMCNC: 29.4 PG
MCHC RBC-ENTMCNC: 34.3 G/DL
MCV RBC AUTO: 85.5 FL
PLATELET # BLD AUTO: 213 K/UL
PMV BLD: 11.3 FL
RBC # BLD: 4.7 M/UL
RBC # FLD: 13.7 %
WBC # FLD AUTO: 9.11 K/UL

## 2020-07-21 PROCEDURE — 99215 OFFICE O/P EST HI 40 MIN: CPT

## 2020-07-21 RX ORDER — PEMBROLIZUMAB 25 MG/ML
200 INJECTION, SOLUTION INTRAVENOUS ONCE
Refills: 0 | Status: COMPLETED | OUTPATIENT
Start: 2020-07-21 | End: 2020-07-21

## 2020-07-21 RX ADMIN — PEMBROLIZUMAB 216 MILLIGRAM(S): 25 INJECTION, SOLUTION INTRAVENOUS at 14:36

## 2020-07-21 RX ADMIN — PEMBROLIZUMAB 200 MILLIGRAM(S): 25 INJECTION, SOLUTION INTRAVENOUS at 15:10

## 2020-07-22 LAB
ALBUMIN SERPL ELPH-MCNC: 4.7 G/DL
ALP BLD-CCNC: 59 U/L
ALT SERPL-CCNC: 17 U/L
ANION GAP SERPL CALC-SCNC: 14 MMOL/L
AST SERPL-CCNC: 18 U/L
BILIRUB SERPL-MCNC: 0.3 MG/DL
BUN SERPL-MCNC: 10 MG/DL
CALCIUM SERPL-MCNC: 10.1 MG/DL
CANCER AG125 SERPL-ACNC: 39 U/ML
CHLORIDE SERPL-SCNC: 101 MMOL/L
CO2 SERPL-SCNC: 25 MMOL/L
CREAT SERPL-MCNC: 0.5 MG/DL
GLUCOSE SERPL-MCNC: 92 MG/DL
POTASSIUM SERPL-SCNC: 4.3 MMOL/L
PROT SERPL-MCNC: 8.4 G/DL
SODIUM SERPL-SCNC: 140 MMOL/L
TSH SERPL-ACNC: 1.55 UIU/ML

## 2020-07-25 NOTE — ASSESSMENT
[FreeTextEntry1] : 54 yof, PMH of DM II,  diagnosed with FIGO IVB well to moderately differentiated endometrioid cancer with focal squamous differentiation and focal clear cell changes.  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 FIGO IVB endometrial cancer\par - s/p 4 cycles of  carboplatin (AUC 6) and taxol (175 mg/m2) every 3 weeks. \par - CT C/A/P after cycle 3 reviewed and showing mixed response. Ca 125 is 20 from 2/15/2020. =35 from 6/30/2020.\par - Was switched from carboplatin/taxol to Keytruda.  NGS testing sent and reviewed.\par \par Ct scan shows mixed response, pt is clinically stable, will continue on immunotherapy with repeat assessment in 2 months\par Proceed with cycle # 6 of Keytruda IV: 200 mg once every 3 weeks until disease progression or unacceptable toxicity. \par - Labs ordered CBC, CMP, , TSH\par \par Side effects discussed:\par Peripheral edema, cardiac arrhythmia, Fatigue, Pruritus, skin rash, Hyperglycemia, hypoalbuminemia, hyperypertriglyceridemia , hyponatremia , hypophosphatemia, hypocalcemia, decreased serum bicarbonate , hypercholesterolemia hypokalemia, hypoglycemia, hypercalcemia, hypothyroidism , hyperkalemia, Diarrhea , decreased appetite, constipation , abdominal pain , nausea, vomiting hematuria, anemia, lymphocytopenia, leukopenia, neutropenia, thrombocytopenia, hemorrhage, increased INR, prolonged partial thromboplastin time \par abnormal liver enzymes\par \par MONITORING as follows\par  liver enzymes at baseline and periodically during treatment; renal function; thyroid function (at baseline, periodically during treatment and as clinically indicated); glucose; CBC with differential;\par signs/symptoms of colitis, dermatologic toxicity, hypophysitis, thyroid disorders, pneumonitis, infusion reactions\par \par The immunotherapy dose was adjusted according to pt's height, weight, labs and anticipated tolerance.\par The high risk of complications and complexity associated with antineoplastic therapy administration has been explained to the pt and family.\par Treatment will be administered under my direct supervision\par -  Although pathologic findings are not suggestive of Koch Syndrome, family history is significant for history of uterine cancer in paternal grandmother (per patient, diagnosed at around 55 years), we offered  genetic testing - \par  used to explain it to the patient, but patient refused to be tested \par \par 2. Microcytic anemia-hemoglobin of 9.5 with MCV of 74.2 on 11/15/2019. secondary to iron deficiency from vaginal bleeding. s/p 2 Feraheme , her iron studies were repeated, and are WNL\par \par 3.  Low serum vitamin D level-started vitamin D2 50,000 units weekly x 8 weeks. completed, continue Vitamin d 2000 units daily\par \par 4. Follow up with GYN/ONC \par \par Pt is aware she needs COVID testing 2 days prior to next keytruda. \par Ct results and plan also discussed\par \par RTC in 3 weeks\par \par Case was seen and discussed with Dr. Willard who agreed with the assessment and plan.\par \par

## 2020-07-25 NOTE — REVIEW OF SYSTEMS
[Fatigue] : fatigue [Vaginal Discharge] : vaginal discharge [Negative] : Heme/Lymph [de-identified] : numbness in fingers

## 2020-07-25 NOTE — HISTORY OF PRESENT ILLNESS
[de-identified] : 1/20/20 : The patient is here for follow up .She received 2 doses of ferraheme prior to chemo . She tolerated carbo, taxol with mild symptoms of nausea and vomiting. She lost her hair , and reported mild numbness of fingers. She reported also minimal vaginal bleed. Her abdominal pain resolved and she is able to ambulate with no problems\par \par 2/10/2020: The patient is here for follow up , she tolerated her second cycle of chemotherapy with no problems , except for mild nausea and vomiting for the first few days. She also reported some mucousy discharge from vagina. She is eating well , no abdominal pain. \par \par 3/2/2020: The patient is here for follow up . She is tolerating chemotherapy with no major side effects except for nausea in the following days.  She had a CT C/A/P after her 3rd cycle which showed mixed response. She denies any vaginal bleed, no rectal pain on defecation, no abdominal pain. \par \par 3/23/2020: The patient is here for follow up. She is tolerating chemotherapy with no major side effects . She is reporting nausea in the following days after chemotherapy. She denies fever, chills, abdominal pain , no constipation or diarrhea . No vaginal bleeding\par \par 04/07/2020\par JAY BOWMAN a 54 year F is here today for follow up of FIGO IVB endometrial cancer.\par Had  Ronnie number [id number].\par S/p 4 cycles of carbo and taxol, tolerating well. Cancelled last chemo appt due to corona virus. \par Reports lower abdominal cramping 2/10 started today. Denies vaginal bleeding today. Did have spotting x 1 since last treatment.\par Denies NVD, fever, chills. \par Pt reports peripheral neuropathy in fingers b/l not bothersome \par Had GYN f/up 3 weeks ago. \par CBC reviewed: WBC 8.6, h/H 12.8/36.9%, , Neutrophils 5.59\par \par 4/28/20: \par History obtained through  Manish from [employer]. ID# [id number]\par Pt returns for a f/u vist. She is S/P 1st cycle of keytruda. She tolerated it fairly well. Abdominal pain has resolved. She continues to have mild yellowish vaginal discharge. No vaginal bleeding.\par \par 05/19/2020\par JAY BOWMAN a 54 year F is here today with son for follow up of FIGO IVB endometrial cancer. \par Pt prefers her son translates today's visit. \par Pt denies abdominal pain, vaginal bleeding, NVD, fever, chills. \par She is s/p 2nd cycle of Keytruda, tolerated well. \par  : 31\par \par 6/9/20\par Pt is here for follow up.\par No new complaints.\par Feels well. Denies any abd pain, nausea, vomiting, rash or fever.\par Has mild vag spotting\par \par 06/30/2020\par JAY BOWMAN a 54 year F is here today for follow up of FIGO IVB endometrial cancer. \par S/p 4 cycles of Keytruda.\par She feels well. Denies any new complaints.\par Denies fever, chills, SOB, vaginal bleeding, abdominal pain, distention. \par Last : 29\par CBC reviewed: H/H 13.5/39.2%.\par \par 7/21/2020\par 55 yo female accompanied by her son for follow up visit for endometrial cancer and evaluation prior to Keytruda #6.  She offers no new complaints.  \par Denies any fever, chills, SOB, vaginal bleeding, abdominal pain or distention.  Last : 35/39/68.\par CBC shows wbc=9.11, Hgb=13.8, jjh=520, ANC=4.89.  H=1.39 on 6/30/2020. She was COVID tested 7/17/2020 not detected. [de-identified] : 54 yof, PMH of DM II, presented to ED in 10/2019 after experiencing worsening of chronic, intermittent pelvic pain and vaginal bleeding.  Patient underwent transvaginal ultrasound, which showed 3.5 x 2.7 x 4.4 cm cervical mass.  CT abdomen/pelvis revealed R iliac chain Ln 1.5 x 1.4 cm, along with 2.2 x 1.8 cm portacaval LNs, and enlargement of uterus.  Endometrial biopsy revealed well to moderately differentiated endometrioid cancer with focal squamous differentiation and focal clear cell changes.  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   CT chest negative for evidence of intrathoracic metastases.  Patient was initially scheduled for ALESHA/BSO on 11/25/2019.  However, MRI pelvis revealed L adnexal peritoneal tumor implants, enlarged pelvic and retroperitoneal lymph nodes, FIGO stage IVB.  ALESHA/BSO was subsequently canceled.  Patient recently saw Dr. Perez, who recommended chemotherapy alone.  Patient is currently only having occasional, mild vaginal bleeding and intermittent pelvic pain, which responds to tylenol.\par \par Family history is significant for history of uterine cancer in paternal grandmother (per patient, diagnosed at around 55 years) and "blood cancer" in maternal grandmother.\par \par Patient lives with her family and is fully functional at baseline.  She used to work as a  but is now no longer working.  She is also is in the process of applying for Medicaid.

## 2020-08-07 ENCOUNTER — LABORATORY RESULT (OUTPATIENT)
Age: 55
End: 2020-08-07

## 2020-08-07 ENCOUNTER — OUTPATIENT (OUTPATIENT)
Dept: OUTPATIENT SERVICES | Facility: HOSPITAL | Age: 55
LOS: 1 days | Discharge: HOME | End: 2020-08-07

## 2020-08-07 DIAGNOSIS — Z11.59 ENCOUNTER FOR SCREENING FOR OTHER VIRAL DISEASES: ICD-10-CM

## 2020-08-11 ENCOUNTER — APPOINTMENT (OUTPATIENT)
Dept: HEMATOLOGY ONCOLOGY | Facility: CLINIC | Age: 55
End: 2020-08-11
Payer: COMMERCIAL

## 2020-08-11 ENCOUNTER — LABORATORY RESULT (OUTPATIENT)
Age: 55
End: 2020-08-11

## 2020-08-11 ENCOUNTER — APPOINTMENT (OUTPATIENT)
Dept: INFUSION THERAPY | Facility: CLINIC | Age: 55
End: 2020-08-11
Payer: COMMERCIAL

## 2020-08-11 VITALS
TEMPERATURE: 98.2 F | SYSTOLIC BLOOD PRESSURE: 117 MMHG | HEART RATE: 59 BPM | BODY MASS INDEX: 26.81 KG/M2 | RESPIRATION RATE: 16 BRPM | HEIGHT: 61 IN | WEIGHT: 142 LBS | DIASTOLIC BLOOD PRESSURE: 62 MMHG

## 2020-08-11 LAB
HCT VFR BLD CALC: 41.6 %
HGB BLD-MCNC: 14.4 G/DL
MCHC RBC-ENTMCNC: 29.4 PG
MCHC RBC-ENTMCNC: 34.6 G/DL
MCV RBC AUTO: 84.9 FL
PLATELET # BLD AUTO: 196 K/UL
PMV BLD: 11.7 FL
RBC # BLD: 4.9 M/UL
RBC # FLD: 14.2 %
WBC # FLD AUTO: 9.67 K/UL

## 2020-08-11 PROCEDURE — 99215 OFFICE O/P EST HI 40 MIN: CPT

## 2020-08-11 RX ORDER — PEMBROLIZUMAB 25 MG/ML
200 INJECTION, SOLUTION INTRAVENOUS ONCE
Refills: 0 | Status: COMPLETED | OUTPATIENT
Start: 2020-08-11 | End: 2020-08-11

## 2020-08-11 RX ADMIN — PEMBROLIZUMAB 216 MILLIGRAM(S): 25 INJECTION, SOLUTION INTRAVENOUS at 16:09

## 2020-08-11 NOTE — REASON FOR VISIT
[Follow-Up Visit] : a follow-up [[employer] ] : provided by [employer]   [FreeTextEntry1] : [id number] [FreeTextEntry2] : Manish [TWNoteComboBox1] : Portuguese

## 2020-08-11 NOTE — REVIEW OF SYSTEMS
[Fatigue] : fatigue [Vaginal Discharge] : vaginal discharge [Negative] : Heme/Lymph [de-identified] : numbness in fingers

## 2020-08-11 NOTE — HISTORY OF PRESENT ILLNESS
[de-identified] : 54 yof, PMH of DM II, presented to ED in 10/2019 after experiencing worsening of chronic, intermittent pelvic pain and vaginal bleeding.  Patient underwent transvaginal ultrasound, which showed 3.5 x 2.7 x 4.4 cm cervical mass.  CT abdomen/pelvis revealed R iliac chain Ln 1.5 x 1.4 cm, along with 2.2 x 1.8 cm portacaval LNs, and enlargement of uterus.  Endometrial biopsy revealed well to moderately differentiated endometrioid cancer with focal squamous differentiation and focal clear cell changes.  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   CT chest negative for evidence of intrathoracic metastases.  Patient was initially scheduled for ALESHA/BSO on 11/25/2019.  However, MRI pelvis revealed L adnexal peritoneal tumor implants, enlarged pelvic and retroperitoneal lymph nodes, FIGO stage IVB.  ALESHA/BSO was subsequently canceled.  Patient recently saw Dr. Perez, who recommended chemotherapy alone.  Patient is currently only having occasional, mild vaginal bleeding and intermittent pelvic pain, which responds to tylenol.\par \par Family history is significant for history of uterine cancer in paternal grandmother (per patient, diagnosed at around 55 years) and "blood cancer" in maternal grandmother.\par \par Patient lives with her family and is fully functional at baseline.  She used to work as a  but is now no longer working.  She is also is in the process of applying for Medicaid. [de-identified] : 1/20/20 : The patient is here for follow up .She received 2 doses of ferraheme prior to chemo . She tolerated carbo, taxol with mild symptoms of nausea and vomiting. She lost her hair , and reported mild numbness of fingers. She reported also minimal vaginal bleed. Her abdominal pain resolved and she is able to ambulate with no problems\par \par 2/10/2020: The patient is here for follow up , she tolerated her second cycle of chemotherapy with no problems , except for mild nausea and vomiting for the first few days. She also reported some mucousy discharge from vagina. She is eating well , no abdominal pain. \par \par 3/2/2020: The patient is here for follow up . She is tolerating chemotherapy with no major side effects except for nausea in the following days.  She had a CT C/A/P after her 3rd cycle which showed mixed response. She denies any vaginal bleed, no rectal pain on defecation, no abdominal pain. \par \par 3/23/2020: The patient is here for follow up. She is tolerating chemotherapy with no major side effects . She is reporting nausea in the following days after chemotherapy. She denies fever, chills, abdominal pain , no constipation or diarrhea . No vaginal bleeding\par \par 04/07/2020\par JAY BOWMAN a 54 year F is here today for follow up of FIGO IVB endometrial cancer.\par Had  Ronnie number [id number].\par S/p 4 cycles of carbo and taxol, tolerating well. Cancelled last chemo appt due to corona virus. \par Reports lower abdominal cramping 2/10 started today. Denies vaginal bleeding today. Did have spotting x 1 since last treatment.\par Denies NVD, fever, chills. \par Pt reports peripheral neuropathy in fingers b/l not bothersome \par Had GYN f/up 3 weeks ago. \par CBC reviewed: WBC 8.6, h/H 12.8/36.9%, , Neutrophils 5.59\par \par 4/28/20: \par History obtained through  Manish from [employer]. ID# [id number]\par Pt returns for a f/u vist. She is S/P 1st cycle of keytruda. She tolerated it fairly well. Abdominal pain has resolved. She continues to have mild yellowish vaginal discharge. No vaginal bleeding.\par \par 05/19/2020\par JAY BOWMAN a 54 year F is here today with son for follow up of FIGO IVB endometrial cancer. \par Pt prefers her son translates today's visit. \par Pt denies abdominal pain, vaginal bleeding, NVD, fever, chills. \par She is s/p 2nd cycle of Keytruda, tolerated well. \par  : 31\par \par 6/9/20\par Pt is here for follow up.\par No new complaints.\par Feels well. Denies any abd pain, nausea, vomiting, rash or fever.\par Has mild vag spotting\par \par 06/30/2020\par JAY BOWMAN a 54 year F is here today for follow up of FIGO IVB endometrial cancer. \par S/p 4 cycles of Keytruda.\par She feels well. Denies any new complaints.\par Denies fever, chills, SOB, vaginal bleeding, abdominal pain, distention. \par Last : 29\par CBC reviewed: H/H 13.5/39.2%.\par \par 7/21/2020\par 53 yo female accompanied by her son for follow up visit for endometrial cancer and evaluation prior to Keytruda #6.  She offers no new complaints.  \par Denies any fever, chills, SOB, vaginal bleeding, abdominal pain or distention.  Last : 35/39/68.\par CBC shows wbc=9.11, Hgb=13.8, pcv=086, ANC=4.89.  H=1.39 on 6/30/2020. She was COVID tested 7/17/2020 not detected.\par \par 8/11/20\par Pt is here for follow up.\par No complaints of abd pain, N, v, D or vaginal bleeding.\par Has mild tingling of feet and finger tips.\par No fever.

## 2020-08-11 NOTE — ASSESSMENT
[FreeTextEntry1] : 54 yof, PMH of DM II,  diagnosed with FIGO IVB well to moderately differentiated endometrioid cancer with focal squamous differentiation and focal clear cell changes.  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 FIGO IVB endometrial cancer\par - s/p 4 cycles of  carboplatin (AUC 6) and taxol (175 mg/m2) every 3 weeks. \par - CT C/A/P after cycle 3 reviewed and showing mixed response. Ca 125 is 20 from 2/15/2020. =35 from 6/30/2020.\par - Was switched from carboplatin/taxol to Keytruda.  NGS testing sent and reviewed.\par \par Ct scan shows mixed response, pt is clinically stable, will continue on immunotherapy with repeat assessment in 2 months\par Proceed with cycle # 7 of Keytruda IV: 200 mg once every 3 weeks until disease progression or unacceptable toxicity. \par - Labs ordered CBC, CMP, ,HgbA1c\par \par Side effects discussed:\par Peripheral edema, cardiac arrhythmia, Fatigue, Pruritus, skin rash, Hyperglycemia, hypoalbuminemia, hyperypertriglyceridemia , hyponatremia , hypophosphatemia, hypocalcemia, decreased serum bicarbonate , hypercholesterolemia hypokalemia, hypoglycemia, hypercalcemia, hypothyroidism , hyperkalemia, Diarrhea , decreased appetite, constipation , abdominal pain , nausea, vomiting hematuria, anemia, lymphocytopenia, leukopenia, neutropenia, thrombocytopenia, hemorrhage, increased INR, prolonged partial thromboplastin time \par abnormal liver enzymes\par \par MONITORING as follows\par  liver enzymes at baseline and periodically during treatment; renal function; thyroid function (at baseline, periodically during treatment and as clinically indicated); glucose; CBC with differential;\par signs/symptoms of colitis, dermatologic toxicity, hypophysitis, thyroid disorders, pneumonitis, infusion reactions\par \par The immunotherapy dose was adjusted according to pt's height, weight, labs and anticipated tolerance.\par The high risk of complications and complexity associated with antineoplastic therapy administration has been explained to the pt and family.\par Treatment will be administered under my direct supervision\par -  Although pathologic findings are not suggestive of Koch Syndrome, family history is significant for history of uterine cancer in paternal grandmother (per patient, diagnosed at around 55 years), we offered  genetic testing - \par  used to explain it to the patient, but patient refused to be tested \par \par 2.  Low serum vitamin D level-started vitamin D2 50,000 units weekly x 8 weeks. completed, continue Vitamin d 2000 units daily\par \par  Follow up with GYN/ONC \par \par Pt is aware she needs COVID testing 2 days prior to next keytruda. \par Pt advised to go for CT chest abd and pelvis prior to next follow up.\par RTC in 3 weeks\par \par

## 2020-08-12 LAB
ALBUMIN SERPL ELPH-MCNC: 4.5 G/DL
ALP BLD-CCNC: 61 U/L
ALT SERPL-CCNC: 19 U/L
ANION GAP SERPL CALC-SCNC: 13 MMOL/L
AST SERPL-CCNC: 22 U/L
BILIRUB SERPL-MCNC: 0.2 MG/DL
BUN SERPL-MCNC: 11 MG/DL
CALCIUM SERPL-MCNC: 9.6 MG/DL
CHLORIDE SERPL-SCNC: 102 MMOL/L
CO2 SERPL-SCNC: 23 MMOL/L
CREAT SERPL-MCNC: 0.6 MG/DL
GLUCOSE SERPL-MCNC: 151 MG/DL
POTASSIUM SERPL-SCNC: 4.4 MMOL/L
PROT SERPL-MCNC: 7.7 G/DL
SODIUM SERPL-SCNC: 138 MMOL/L

## 2020-08-14 LAB — TSH SERPL-ACNC: 1.19 UIU/ML

## 2020-08-22 ENCOUNTER — RESULT REVIEW (OUTPATIENT)
Age: 55
End: 2020-08-22

## 2020-08-22 ENCOUNTER — OUTPATIENT (OUTPATIENT)
Dept: OUTPATIENT SERVICES | Facility: HOSPITAL | Age: 55
LOS: 1 days | Discharge: HOME | End: 2020-08-22
Payer: SUBSIDIZED

## 2020-08-22 DIAGNOSIS — R10.2 PELVIC AND PERINEAL PAIN: ICD-10-CM

## 2020-08-22 DIAGNOSIS — C54.1 MALIGNANT NEOPLASM OF ENDOMETRIUM: ICD-10-CM

## 2020-08-22 DIAGNOSIS — R10.9 UNSPECIFIED ABDOMINAL PAIN: ICD-10-CM

## 2020-08-22 PROCEDURE — 71260 CT THORAX DX C+: CPT | Mod: 26

## 2020-08-22 PROCEDURE — 74177 CT ABD & PELVIS W/CONTRAST: CPT | Mod: 26

## 2020-08-29 ENCOUNTER — LABORATORY RESULT (OUTPATIENT)
Age: 55
End: 2020-08-29

## 2020-08-29 ENCOUNTER — OUTPATIENT (OUTPATIENT)
Dept: OUTPATIENT SERVICES | Facility: HOSPITAL | Age: 55
LOS: 1 days | Discharge: HOME | End: 2020-08-29

## 2020-08-29 DIAGNOSIS — Z11.59 ENCOUNTER FOR SCREENING FOR OTHER VIRAL DISEASES: ICD-10-CM

## 2020-09-01 ENCOUNTER — LABORATORY RESULT (OUTPATIENT)
Age: 55
End: 2020-09-01

## 2020-09-01 ENCOUNTER — APPOINTMENT (OUTPATIENT)
Dept: INFUSION THERAPY | Facility: CLINIC | Age: 55
End: 2020-09-01
Payer: COMMERCIAL

## 2020-09-01 ENCOUNTER — APPOINTMENT (OUTPATIENT)
Dept: HEMATOLOGY ONCOLOGY | Facility: CLINIC | Age: 55
End: 2020-09-01
Payer: COMMERCIAL

## 2020-09-01 VITALS
SYSTOLIC BLOOD PRESSURE: 114 MMHG | HEART RATE: 57 BPM | BODY MASS INDEX: 26.43 KG/M2 | DIASTOLIC BLOOD PRESSURE: 69 MMHG | WEIGHT: 140 LBS | TEMPERATURE: 98.3 F | HEIGHT: 61 IN

## 2020-09-01 LAB
HCT VFR BLD CALC: 41.2 %
HGB BLD-MCNC: 14 G/DL
MCHC RBC-ENTMCNC: 28.8 PG
MCHC RBC-ENTMCNC: 34 G/DL
MCV RBC AUTO: 84.8 FL
PLATELET # BLD AUTO: 231 K/UL
PMV BLD: 11.6 FL
RBC # BLD: 4.86 M/UL
RBC # FLD: 14.3 %
WBC # FLD AUTO: 9.91 K/UL

## 2020-09-01 PROCEDURE — 99215 OFFICE O/P EST HI 40 MIN: CPT

## 2020-09-01 RX ORDER — PEMBROLIZUMAB 25 MG/ML
200 INJECTION, SOLUTION INTRAVENOUS ONCE
Refills: 0 | Status: COMPLETED | OUTPATIENT
Start: 2020-09-01 | End: 2020-09-01

## 2020-09-01 RX ADMIN — PEMBROLIZUMAB 216 MILLIGRAM(S): 25 INJECTION, SOLUTION INTRAVENOUS at 15:29

## 2020-09-01 NOTE — HISTORY OF PRESENT ILLNESS
[de-identified] : 54 yof, PMH of DM II, presented to ED in 10/2019 after experiencing worsening of chronic, intermittent pelvic pain and vaginal bleeding.  Patient underwent transvaginal ultrasound, which showed 3.5 x 2.7 x 4.4 cm cervical mass.  CT abdomen/pelvis revealed R iliac chain Ln 1.5 x 1.4 cm, along with 2.2 x 1.8 cm portacaval LNs, and enlargement of uterus.  Endometrial biopsy revealed well to moderately differentiated endometrioid cancer with focal squamous differentiation and focal clear cell changes.  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   CT chest negative for evidence of intrathoracic metastases.  Patient was initially scheduled for ALESHA/BSO on 11/25/2019.  However, MRI pelvis revealed L adnexal peritoneal tumor implants, enlarged pelvic and retroperitoneal lymph nodes, FIGO stage IVB.  ALESHA/BSO was subsequently canceled.  Patient recently saw Dr. Perez, who recommended chemotherapy alone.  Patient is currently only having occasional, mild vaginal bleeding and intermittent pelvic pain, which responds to tylenol.\par \par Family history is significant for history of uterine cancer in paternal grandmother (per patient, diagnosed at around 55 years) and "blood cancer" in maternal grandmother.\par \par Patient lives with her family and is fully functional at baseline.  She used to work as a  but is now no longer working.  She is also is in the process of applying for Medicaid. [de-identified] : 1/20/20 : The patient is here for follow up .She received 2 doses of ferraheme prior to chemo . She tolerated carbo, taxol with mild symptoms of nausea and vomiting. She lost her hair , and reported mild numbness of fingers. She reported also minimal vaginal bleed. Her abdominal pain resolved and she is able to ambulate with no problems\par \par 2/10/2020: The patient is here for follow up , she tolerated her second cycle of chemotherapy with no problems , except for mild nausea and vomiting for the first few days. She also reported some mucousy discharge from vagina. She is eating well , no abdominal pain. \par \par 3/2/2020: The patient is here for follow up . She is tolerating chemotherapy with no major side effects except for nausea in the following days.  She had a CT C/A/P after her 3rd cycle which showed mixed response. She denies any vaginal bleed, no rectal pain on defecation, no abdominal pain. \par \par 3/23/2020: The patient is here for follow up. She is tolerating chemotherapy with no major side effects . She is reporting nausea in the following days after chemotherapy. She denies fever, chills, abdominal pain , no constipation or diarrhea . No vaginal bleeding\par \par 04/07/2020\par JAY BOWMAN a 54 year F is here today for follow up of FIGO IVB endometrial cancer.\par Had  Ronnie number [id number].\par S/p 4 cycles of carbo and taxol, tolerating well. Cancelled last chemo appt due to corona virus. \par Reports lower abdominal cramping 2/10 started today. Denies vaginal bleeding today. Did have spotting x 1 since last treatment.\par Denies NVD, fever, chills. \par Pt reports peripheral neuropathy in fingers b/l not bothersome \par Had GYN f/up 3 weeks ago. \par CBC reviewed: WBC 8.6, h/H 12.8/36.9%, , Neutrophils 5.59\par \par 4/28/20: \par History obtained through  Manish from [employer]. ID# [id number]\par Pt returns for a f/u vist. She is S/P 1st cycle of keytruda. She tolerated it fairly well. Abdominal pain has resolved. She continues to have mild yellowish vaginal discharge. No vaginal bleeding.\par \par 05/19/2020\par JAY BOWMAN a 54 year F is here today with son for follow up of FIGO IVB endometrial cancer. \par Pt prefers her son translates today's visit. \par Pt denies abdominal pain, vaginal bleeding, NVD, fever, chills. \par She is s/p 2nd cycle of Keytruda, tolerated well. \par  : 31\par \par 6/9/20\par Pt is here for follow up.\par No new complaints.\par Feels well. Denies any abd pain, nausea, vomiting, rash or fever.\par Has mild vag spotting\par \par 06/30/2020\par JAY BOWMAN a 54 year F is here today for follow up of FIGO IVB endometrial cancer. \par S/p 4 cycles of Keytruda.\par She feels well. Denies any new complaints.\par Denies fever, chills, SOB, vaginal bleeding, abdominal pain, distention. \par Last : 29\par CBC reviewed: H/H 13.5/39.2%.\par \par 7/21/2020\par 55 yo female accompanied by her son for follow up visit for endometrial cancer and evaluation prior to Keytruda #6.  She offers no new complaints.  \par Denies any fever, chills, SOB, vaginal bleeding, abdominal pain or distention.  Last : 35/39/68.\par CBC shows wbc=9.11, Hgb=13.8, ela=239, ANC=4.89.  H=1.39 on 6/30/2020. She was COVID tested 7/17/2020 not detected.\par \par 8/11/20\par Pt is here for follow up.\par No complaints of abd pain, N, v, D or vaginal bleeding.\par Has mild tingling of feet and finger tips.\par No fever.\par \par 9/1/20\par Pt is here for follow up.\par Tolerating Keytruda well.\par Patient denies abdominal pain or bloating, denies vaginal bleeding or discharge.\par Patient denies shortness of breath, denies fever, denies bone pain.\par Has occasional vaginal discomfort.\par

## 2020-09-01 NOTE — REVIEW OF SYSTEMS
[Fatigue] : fatigue [Vaginal Discharge] : vaginal discharge [Negative] : Heme/Lymph [de-identified] : numbness in fingers

## 2020-09-01 NOTE — ASSESSMENT
[FreeTextEntry1] : 54 yof, PMH of DM II,  diagnosed with FIGO IVB well to moderately differentiated endometrioid cancer with focal squamous differentiation and focal clear cell changes.  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 FIGO IVB endometrial cancer\par - s/p 4 cycles of  carboplatin (AUC 6) and taxol (175 mg/m2) every 3 weeks. \par \par - Was switched from carboplatin/taxol to Keytruda.  NGS testing sent and reviewed.\par \par Ct scan from 8/22/20 shows stable diseases, pt is clinically stable, will continue on immunotherapy with repeat assessment in 2 months\par Proceed with cycle # 8 of Keytruda IV: 200 mg once every 3 weeks until disease progression or unacceptable toxicity. \par - Labs ordered CBC, CMP, , ca125, 25 Oh Vit D\par \par Side effects discussed:\par Peripheral edema, cardiac arrhythmia, Fatigue, Pruritus, skin rash, Hyperglycemia, hypoalbuminemia, hyperypertriglyceridemia , hyponatremia , hypophosphatemia, hypocalcemia, decreased serum bicarbonate , hypercholesterolemia hypokalemia, hypoglycemia, hypercalcemia, hypothyroidism , hyperkalemia, Diarrhea , decreased appetite, constipation , abdominal pain , nausea, vomiting hematuria, anemia, lymphocytopenia, leukopenia, neutropenia, thrombocytopenia, hemorrhage, increased INR, prolonged partial thromboplastin time \par abnormal liver enzymes\par \par MONITORING as follows\par  liver enzymes at baseline and periodically during treatment; renal function; thyroid function (at baseline, periodically during treatment and as clinically indicated); glucose; CBC with differential;\par signs/symptoms of colitis, dermatologic toxicity, hypophysitis, thyroid disorders, pneumonitis, infusion reactions\par \par The immunotherapy dose was adjusted according to pt's height, weight, labs and anticipated tolerance.\par The high risk of complications and complexity associated with antineoplastic therapy administration has been explained to the pt and family.\par Treatment will be administered under my direct supervision\par -  Although pathologic findings are not suggestive of Koch Syndrome, family history is significant for history of uterine cancer in paternal grandmother (per patient, diagnosed at around 55 years), we offered  genetic testing - \par  used to explain it to the patient, but patient refused to be tested \par \par 2.  Low serum vitamin D level-started vitamin D2 50,000 units weekly x 8 weeks. completed, continue Vitamin d 2000 units daily\par \par  Follow up with GYN/ONC \par \par RTC in 3 weeks\par \par

## 2020-09-01 NOTE — REASON FOR VISIT
[Follow-Up Visit] : a follow-up [[employer] ] : provided by [employer]   [FreeTextEntry1] : [id number] [FreeTextEntry2] : Manish [TWNoteComboBox1] : [place]

## 2020-09-03 LAB
25(OH)D3 SERPL-MCNC: 32 NG/ML
CANCER AG125 SERPL-ACNC: 26 U/ML

## 2020-09-22 ENCOUNTER — APPOINTMENT (OUTPATIENT)
Dept: INFUSION THERAPY | Facility: CLINIC | Age: 55
End: 2020-09-22
Payer: COMMERCIAL

## 2020-09-22 ENCOUNTER — LABORATORY RESULT (OUTPATIENT)
Age: 55
End: 2020-09-22

## 2020-09-22 ENCOUNTER — APPOINTMENT (OUTPATIENT)
Dept: HEMATOLOGY ONCOLOGY | Facility: CLINIC | Age: 55
End: 2020-09-22
Payer: COMMERCIAL

## 2020-09-22 VITALS
TEMPERATURE: 98 F | HEART RATE: 74 BPM | RESPIRATION RATE: 18 BRPM | BODY MASS INDEX: 28.51 KG/M2 | SYSTOLIC BLOOD PRESSURE: 121 MMHG | DIASTOLIC BLOOD PRESSURE: 75 MMHG | WEIGHT: 151 LBS | HEIGHT: 61 IN

## 2020-09-22 PROCEDURE — 99215 OFFICE O/P EST HI 40 MIN: CPT

## 2020-09-22 RX ORDER — PEMBROLIZUMAB 25 MG/ML
200 INJECTION, SOLUTION INTRAVENOUS ONCE
Refills: 0 | Status: DISCONTINUED | OUTPATIENT
Start: 2020-09-22 | End: 2020-09-22

## 2020-09-22 RX ORDER — PEMBROLIZUMAB 25 MG/ML
200 INJECTION, SOLUTION INTRAVENOUS ONCE
Refills: 0 | Status: COMPLETED | OUTPATIENT
Start: 2020-09-22 | End: 2020-09-22

## 2020-09-22 RX ADMIN — PEMBROLIZUMAB 200 MILLIGRAM(S): 25 INJECTION, SOLUTION INTRAVENOUS at 15:31

## 2020-09-22 RX ADMIN — PEMBROLIZUMAB 216 MILLIGRAM(S): 25 INJECTION, SOLUTION INTRAVENOUS at 15:00

## 2020-09-22 NOTE — HISTORY OF PRESENT ILLNESS
[de-identified] : 54 yof, PMH of DM II, presented to ED in 10/2019 after experiencing worsening of chronic, intermittent pelvic pain and vaginal bleeding.  Patient underwent transvaginal ultrasound, which showed 3.5 x 2.7 x 4.4 cm cervical mass.  CT abdomen/pelvis revealed R iliac chain Ln 1.5 x 1.4 cm, along with 2.2 x 1.8 cm portacaval LNs, and enlargement of uterus.  Endometrial biopsy revealed well to moderately differentiated endometrioid cancer with focal squamous differentiation and focal clear cell changes.  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   CT chest negative for evidence of intrathoracic metastases.  Patient was initially scheduled for ALESHA/BSO on 11/25/2019.  However, MRI pelvis revealed L adnexal peritoneal tumor implants, enlarged pelvic and retroperitoneal lymph nodes, FIGO stage IVB.  ALESHA/BSO was subsequently canceled.  Patient recently saw Dr. Perez, who recommended chemotherapy alone.  Patient is currently only having occasional, mild vaginal bleeding and intermittent pelvic pain, which responds to tylenol.\par \par Family history is significant for history of uterine cancer in paternal grandmother (per patient, diagnosed at around 55 years) and "blood cancer" in maternal grandmother.\par \par Patient lives with her family and is fully functional at baseline.  She used to work as a  but is now no longer working.  She is also is in the process of applying for Medicaid. [de-identified] : Microsatellite status MSI-High §\par Tumor Mutational [place] 42 Muts/Mb §\par ARID1A P427fs*6\par ARID1A W1740ku*15\par ATRX I127fs*7\par CASP8 splice site 305+1G>A\par CCND1 P287A\par CDC73 E325*\par CREBBP A146fs*6\par CTCF T204fs*26\par FGFR3 V630M\par FLCN H429fs*39\par JAK1 K860fs*16\par KRAS G12D\par MET E033hsj\par MSH3 K383fs*32\par NFE2L2 E82D\par PDGFRB R919W\par PIK3CA R38C\par PIK3R1 S071gde\par PTEN V166fs*14\par RNF43 G659fs*41\par TP53 D7H  [de-identified] : 1/20/20 : The patient is here for follow up .She received 2 doses of ferraheme prior to chemo . She tolerated carbo, taxol with mild symptoms of nausea and vomiting. She lost her hair , and reported mild numbness of fingers. She reported also minimal vaginal bleed. Her abdominal pain resolved and she is able to ambulate with no problems\par \par 2/10/2020: The patient is here for follow up , she tolerated her second cycle of chemotherapy with no problems , except for mild nausea and vomiting for the first few days. She also reported some mucousy discharge from vagina. She is eating well , no abdominal pain. \par \par 3/2/2020: The patient is here for follow up . She is tolerating chemotherapy with no major side effects except for nausea in the following days.  She had a CT C/A/P after her 3rd cycle which showed mixed response. She denies any vaginal bleed, no rectal pain on defecation, no abdominal pain. \par \par 3/23/2020: The patient is here for follow up. She is tolerating chemotherapy with no major side effects . She is reporting nausea in the following days after chemotherapy. She denies fever, chills, abdominal pain , no constipation or diarrhea . No vaginal bleeding\par \par 04/07/2020\par JAY BOWMAN a 54 year F is here today for follow up of FIGO IVB endometrial cancer.\par Had  Ronnie number [id number].\par S/p 4 cycles of carbo and taxol, tolerating well. Cancelled last chemo appt due to corona virus. \par Reports lower abdominal cramping 2/10 started today. Denies vaginal bleeding today. Did have spotting x 1 since last treatment.\par Denies NVD, fever, chills. \par Pt reports peripheral neuropathy in fingers b/l not bothersome \par Had GYN f/up 3 weeks ago. \par CBC reviewed: WBC 8.6, h/H 12.8/36.9%, , Neutrophils 5.59\par \par 4/28/20: \par History obtained through  Manish from [employer]. ID# [id number]\par Pt returns for a f/u vist. She is S/P 1st cycle of keytruda. She tolerated it fairly well. Abdominal pain has resolved. She continues to have mild yellowish vaginal discharge. No vaginal bleeding.\par \par 05/19/2020\par JAY BOWMAN a 54 year F is here today with son for follow up of FIGO IVB endometrial cancer. \par Pt prefers her son translates today's visit. \par Pt denies abdominal pain, vaginal bleeding, NVD, fever, chills. \par She is s/p 2nd cycle of Keytruda, tolerated well. \par  : 31\par \par 6/9/20\par Pt is here for follow up.\par No new complaints.\par Feels well. Denies any abd pain, nausea, vomiting, rash or fever.\par Has mild vag spotting\par \par 06/30/2020\par JAY BOWMAN a 54 year F is here today for follow up of FIGO IVB endometrial cancer. \par S/p 4 cycles of Keytruda.\par She feels well. Denies any new complaints.\par Denies fever, chills, SOB, vaginal bleeding, abdominal pain, distention. \par Last : 29\par CBC reviewed: H/H 13.5/39.2%.\par \par 7/21/2020\par 53 yo female accompanied by her son for follow up visit for endometrial cancer and evaluation prior to Keytruda #6.  She offers no new complaints.  \par Denies any fever, chills, SOB, vaginal bleeding, abdominal pain or distention.  Last : 35/39/68.\par CBC shows wbc=9.11, Hgb=13.8, izc=645, ANC=4.89.  H=1.39 on 6/30/2020. She was COVID tested 7/17/2020 not detected.\par \par 8/11/20\par Pt is here for follow up.\par No complaints of abd pain, N, v, D or vaginal bleeding.\par Has mild tingling of feet and finger tips.\par No fever.\par \par 9/1/20\par Pt is here for follow up.\par Tolerating Keytruda well.\par Patient denies abdominal pain or bloating, denies vaginal bleeding or discharge.\par Patient denies shortness of breath, denies fever, denies bone pain.\par Has occasional vaginal discomfort.\par \par 9/22/20\par Pt is here  for follow up.\par No new complaints, Denies abdominal pain, N, V, d\par No vag bleeding\par No SOB.

## 2020-09-22 NOTE — ASSESSMENT
[FreeTextEntry1] : 54 yof, PMH of DM II,  diagnosed with FIGO IVB well to moderately differentiated endometrioid cancer with focal squamous differentiation and focal clear cell changes.  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 FIGO IVB endometrial cancer\par - s/p 4 cycles of  carboplatin (AUC 6) and taxol (175 mg/m2) every 3 weeks. \par \par - Was switched from carboplatin/taxol to Keytruda.  NGS testing sent and reviewed.\par \par Ct scan from 8/22/20 shows stable diseases, pt is clinically stable, will continue on immunotherapy with repeat assessment in 2 months\par Proceed with cycle # 9 of Keytruda IV: 200 mg once every 3 weeks until disease progression or unacceptable toxicity. \par - Labs ordered CBC, CMP, , ca125, \par Side effects discussed:\par Peripheral edema, cardiac arrhythmia, Fatigue, Pruritus, skin rash, Hyperglycemia, hypoalbuminemia, hyperypertriglyceridemia , hyponatremia , hypophosphatemia, hypocalcemia, decreased serum bicarbonate , hypercholesterolemia hypokalemia, hypoglycemia, hypercalcemia, hypothyroidism , hyperkalemia, Diarrhea , decreased appetite, constipation , abdominal pain , nausea, vomiting hematuria, anemia, lymphocytopenia, leukopenia, neutropenia, thrombocytopenia, hemorrhage, increased INR, prolonged partial thromboplastin time \par abnormal liver enzymes\par \par MONITORING as follows\par  liver enzymes at baseline and periodically during treatment; renal function; thyroid function (at baseline, periodically during treatment and as clinically indicated); glucose; CBC with differential;\par signs/symptoms of colitis, dermatologic toxicity, hypophysitis, thyroid disorders, pneumonitis, infusion reactions\par \par The immunotherapy dose was adjusted according to pt's height, weight, labs and anticipated tolerance.\par The high risk of complications and complexity associated with antineoplastic therapy administration has been explained to the pt and family.\par Treatment will be administered under my direct supervision\par -  Although pathologic findings are not suggestive of Koch Syndrome, family history is significant for history of uterine cancer in paternal grandmother (per patient, diagnosed at around 55 years), we offered  genetic testing - \par  used to explain it to the patient, but patient refused to be tested \par \par 2.  Low serum vitamin D level-started vitamin D2 50,000 units weekly x 8 weeks. completed, continue Vitamin d 2000 units daily\par \par  Follow up with GYN/ONC \par \par RTC in 3 weeks\par \par

## 2020-09-22 NOTE — REASON FOR VISIT
[Follow-Up Visit] : a follow-up [[employer] ] : provided by [employer]   [FreeTextEntry1] : [id number] [FreeTextEntry2] : Manish [TWNoteComboBox1] : [place]

## 2020-09-22 NOTE — REVIEW OF SYSTEMS
[Fatigue] : fatigue [Vaginal Discharge] : vaginal discharge [Negative] : Heme/Lymph [de-identified] : numbness in fingers

## 2020-09-23 ENCOUNTER — APPOINTMENT (OUTPATIENT)
Dept: HEMATOLOGY ONCOLOGY | Facility: CLINIC | Age: 55
End: 2020-09-23

## 2020-09-23 LAB
ALBUMIN SERPL ELPH-MCNC: 4.2 G/DL
ALBUMIN SERPL ELPH-MCNC: 4.4 G/DL
ALP BLD-CCNC: 68 U/L
ALP BLD-CCNC: 71 U/L
ALT SERPL-CCNC: 27 U/L
ALT SERPL-CCNC: 28 U/L
ANION GAP SERPL CALC-SCNC: 10 MMOL/L
ANION GAP SERPL CALC-SCNC: 15 MMOL/L
AST SERPL-CCNC: 20 U/L
AST SERPL-CCNC: 29 U/L
BILIRUB SERPL-MCNC: 0.4 MG/DL
BILIRUB SERPL-MCNC: 0.5 MG/DL
BUN SERPL-MCNC: 15 MG/DL
BUN SERPL-MCNC: 17 MG/DL
CALCIUM SERPL-MCNC: 9.2 MG/DL
CALCIUM SERPL-MCNC: 9.5 MG/DL
CHLORIDE SERPL-SCNC: 95 MMOL/L
CHLORIDE SERPL-SCNC: 98 MMOL/L
CO2 SERPL-SCNC: 21 MMOL/L
CO2 SERPL-SCNC: 25 MMOL/L
CREAT SERPL-MCNC: 0.7 MG/DL
CREAT SERPL-MCNC: 0.7 MG/DL
GLUCOSE SERPL-MCNC: 353 MG/DL
GLUCOSE SERPL-MCNC: 465 MG/DL
HCT VFR BLD CALC: 34.5 %
HGB BLD-MCNC: 11.8 G/DL
MCHC RBC-ENTMCNC: 28.9 PG
MCHC RBC-ENTMCNC: 34.2 G/DL
MCV RBC AUTO: 84.4 FL
PLATELET # BLD AUTO: 125 K/UL
PMV BLD: 12.1 FL
POTASSIUM SERPL-SCNC: 4.3 MMOL/L
POTASSIUM SERPL-SCNC: 5.2 MMOL/L
PROT SERPL-MCNC: 7.4 G/DL
PROT SERPL-MCNC: 8 G/DL
RBC # BLD: 4.09 M/UL
RBC # FLD: 14 %
SODIUM SERPL-SCNC: 131 MMOL/L
SODIUM SERPL-SCNC: 133 MMOL/L
TSH SERPL-ACNC: 1.59 UIU/ML
WBC # FLD AUTO: 10.33 K/UL

## 2020-09-25 NOTE — ED ADULT TRIAGE NOTE - ACCOMPANIED BY
Self
Patient Specific Counseling (Will Not Stick From Patient To Patient): Gave patient picture of a “back wand” that has a pad on the end, for sale at [employer] for $7.00. He can use this to apply triamcinolone to hard to reach places on his back.
Detail Level: Detailed
Patient Specific Counseling (Will Not Stick From Patient To Patient): Gave patient samples of Bacitracin.

## 2020-10-13 ENCOUNTER — APPOINTMENT (OUTPATIENT)
Dept: INFUSION THERAPY | Facility: CLINIC | Age: 55
End: 2020-10-13
Payer: COMMERCIAL

## 2020-10-13 ENCOUNTER — LABORATORY RESULT (OUTPATIENT)
Age: 55
End: 2020-10-13

## 2020-10-13 ENCOUNTER — APPOINTMENT (OUTPATIENT)
Dept: HEMATOLOGY ONCOLOGY | Facility: CLINIC | Age: 55
End: 2020-10-13
Payer: COMMERCIAL

## 2020-10-13 VITALS
HEIGHT: 61 IN | BODY MASS INDEX: 27.75 KG/M2 | WEIGHT: 147 LBS | DIASTOLIC BLOOD PRESSURE: 78 MMHG | TEMPERATURE: 98.7 F | SYSTOLIC BLOOD PRESSURE: 113 MMHG | HEART RATE: 66 BPM | RESPIRATION RATE: 18 BRPM

## 2020-10-13 LAB
HCT VFR BLD CALC: 42.6 %
HGB BLD-MCNC: 14.9 G/DL
MCHC RBC-ENTMCNC: 29.6 PG
MCHC RBC-ENTMCNC: 35 G/DL
MCV RBC AUTO: 84.7 FL
PLATELET # BLD AUTO: 218 K/UL
PMV BLD: 11.6 FL
RBC # BLD: 5.03 M/UL
RBC # FLD: 13.9 %
WBC # FLD AUTO: 9.09 K/UL

## 2020-10-13 PROCEDURE — 99215 OFFICE O/P EST HI 40 MIN: CPT

## 2020-10-13 RX ORDER — PEMBROLIZUMAB 25 MG/ML
200 INJECTION, SOLUTION INTRAVENOUS ONCE
Refills: 0 | Status: COMPLETED | OUTPATIENT
Start: 2020-10-13 | End: 2020-10-13

## 2020-10-13 RX ADMIN — PEMBROLIZUMAB 216 MILLIGRAM(S): 25 INJECTION, SOLUTION INTRAVENOUS at 15:16

## 2020-10-14 LAB
ALBUMIN SERPL ELPH-MCNC: 4.8 G/DL
ALP BLD-CCNC: 71 U/L
ALT SERPL-CCNC: 24 U/L
ANION GAP SERPL CALC-SCNC: 15 MMOL/L
AST SERPL-CCNC: 24 U/L
BILIRUB SERPL-MCNC: 0.3 MG/DL
BUN SERPL-MCNC: 10 MG/DL
CALCIUM SERPL-MCNC: 10.4 MG/DL
CANCER AG125 SERPL-ACNC: 23 U/ML
CHLORIDE SERPL-SCNC: 99 MMOL/L
CO2 SERPL-SCNC: 22 MMOL/L
CREAT SERPL-MCNC: 0.6 MG/DL
GLUCOSE SERPL-MCNC: 207 MG/DL
POTASSIUM SERPL-SCNC: 4.7 MMOL/L
PROT SERPL-MCNC: 8.5 G/DL
SODIUM SERPL-SCNC: 136 MMOL/L
T4 FREE SERPL-MCNC: 1.4 NG/DL
TSH SERPL-ACNC: 1.6 UIU/ML

## 2020-10-15 ENCOUNTER — APPOINTMENT (OUTPATIENT)
Dept: INTERNAL MEDICINE | Facility: CLINIC | Age: 55
End: 2020-10-15
Payer: COMMERCIAL

## 2020-10-15 ENCOUNTER — OUTPATIENT (OUTPATIENT)
Dept: OUTPATIENT SERVICES | Facility: HOSPITAL | Age: 55
LOS: 1 days | Discharge: HOME | End: 2020-10-15

## 2020-10-15 VITALS
WEIGHT: 147 LBS | HEIGHT: 61 IN | HEART RATE: 70 BPM | SYSTOLIC BLOOD PRESSURE: 118 MMHG | DIASTOLIC BLOOD PRESSURE: 70 MMHG | RESPIRATION RATE: 20 BRPM | TEMPERATURE: 98.3 F | BODY MASS INDEX: 27.75 KG/M2

## 2020-10-15 DIAGNOSIS — Z85.42 PERSONAL HISTORY OF MALIGNANT NEOPLASM OF OTHER PARTS OF UTERUS: ICD-10-CM

## 2020-10-15 DIAGNOSIS — Z87.42 PERSONAL HISTORY OF OTHER DISEASES OF THE FEMALE GENITAL TRACT: ICD-10-CM

## 2020-10-15 DIAGNOSIS — D50.9 IRON DEFICIENCY ANEMIA, UNSPECIFIED: ICD-10-CM

## 2020-10-15 DIAGNOSIS — R92.2 INCONCLUSIVE MAMMOGRAM: ICD-10-CM

## 2020-10-15 DIAGNOSIS — Z86.39 PERSONAL HISTORY OF OTHER ENDOCRINE, NUTRITIONAL AND METABOLIC DISEASE: ICD-10-CM

## 2020-10-15 DIAGNOSIS — R79.89 OTHER SPECIFIED ABNORMAL FINDINGS OF BLOOD CHEMISTRY: ICD-10-CM

## 2020-10-15 PROCEDURE — 99213 OFFICE O/P EST LOW 20 MIN: CPT | Mod: GC

## 2020-10-15 NOTE — PHYSICAL EXAM
[No Acute Distress] : no acute distress [No Respiratory Distress] : no respiratory distress  [Well Nourished] : well nourished [No Accessory Muscle Use] : no accessory muscle use [Normal Rate] : normal rate  [Clear to Auscultation] : lungs were clear to auscultation bilaterally [Non Tender] : non-tender [Soft] : abdomen soft [Non-distended] : non-distended [No Rash] : no rash [Normal] : normal gait, coordination grossly intact, no focal deficits and deep tendon reflexes were 2+ and symmetric

## 2020-10-15 NOTE — ASSESSMENT
[FreeTextEntry1] : 53 yo F with pmhx DLD, DM II (hrb2t=7%), adenocarcinoma of the uterus stage 4 (on chemotherapy - s/p 4 cycles of carboplatin and taxol every 3 weeks + immunotherapy)\par \par # DMII (wit9k=1%)\par - repeat hba1c\par - patient has not been taking metformin. Will re-start metformin\par - opthalmo+ poditory refferal \par \par # adenocarcinoma of the uterus\par - being followed up by onco \par - on chemotherapy + immunotherapy. Oncology will evaluate for response to chemo+ immuno then decide on surgery. \par \par # HCM\par - mammogram Jan 2020: WNL\par - PAP smear in 2018 WNL\par \par Vit D, hba1c, urine microalbumin for next visit\par f/u in 3 months\par

## 2020-10-15 NOTE — HISTORY OF PRESENT ILLNESS
[de-identified] : 53 yo F with pmhx DLD, DM II (jzt3j=0%), adenocarcinoma of the uterus stage 4 (on chemotherapy - s/p 4 cycles of carboplatin and taxol every 3 weeks)\par Patient is presenting for follow up\par Interval history.  \par patient has been getting chemotherapy + immunotherapy\par Reports having a high finger stick reading.  [FreeTextEntry1] : f/up visit.

## 2020-10-15 NOTE — REVIEW OF SYSTEMS
[Fever] : no fever [Chills] : no chills [Chest Pain] : no chest pain [Shortness Of Breath] : no shortness of breath [Abdominal Pain] : no abdominal pain [Palpitations] : no palpitations [Skin Rash] : no skin rash [Headache] : no headache

## 2020-10-16 NOTE — REVIEW OF SYSTEMS
[Fatigue] : fatigue [Vaginal Discharge] : vaginal discharge [Negative] : Heme/Lymph [de-identified] : numbness in fingers

## 2020-10-16 NOTE — REASON FOR VISIT
[Follow-Up Visit] : a follow-up [[employer] ] : provided by [employer]   [FreeTextEntry1] : [id number] [TWNoteComboBox1] : [place] [FreeTextEntry2] : Manish

## 2020-10-16 NOTE — HISTORY OF PRESENT ILLNESS
[de-identified] : 1/20/20 : The patient is here for follow up .She received 2 doses of ferraheme prior to chemo . She tolerated carbo, taxol with mild symptoms of nausea and vomiting. She lost her hair , and reported mild numbness of fingers. She reported also minimal vaginal bleed. Her abdominal pain resolved and she is able to ambulate with no problems\par \par 2/10/2020: The patient is here for follow up , she tolerated her second cycle of chemotherapy with no problems , except for mild nausea and vomiting for the first few days. She also reported some mucousy discharge from vagina. She is eating well , no abdominal pain. \par \par 3/2/2020: The patient is here for follow up . She is tolerating chemotherapy with no major side effects except for nausea in the following days.  She had a CT C/A/P after her 3rd cycle which showed mixed response. She denies any vaginal bleed, no rectal pain on defecation, no abdominal pain. \par \par 3/23/2020: The patient is here for follow up. She is tolerating chemotherapy with no major side effects . She is reporting nausea in the following days after chemotherapy. She denies fever, chills, abdominal pain , no constipation or diarrhea . No vaginal bleeding\par \par 04/07/2020\par JAY BOWMAN a 54 year F is here today for follow up of FIGO IVB endometrial cancer.\par Had  Ronnie number [id number].\par S/p 4 cycles of carbo and taxol, tolerating well. Cancelled last chemo appt due to corona virus. \par Reports lower abdominal cramping 2/10 started today. Denies vaginal bleeding today. Did have spotting x 1 since last treatment.\par Denies NVD, fever, chills. \par Pt reports peripheral neuropathy in fingers b/l not bothersome \par Had GYN f/up 3 weeks ago. \par CBC reviewed: WBC 8.6, h/H 12.8/36.9%, , Neutrophils 5.59\par \par 4/28/20: \par History obtained through  Manish from [facility] [facility] [employer]. ID# [id number]\par Pt returns for a f/u vist. She is S/P 1st cycle of keytruda. She tolerated it fairly well. Abdominal pain has resolved. She continues to have mild yellowish vaginal discharge. No vaginal bleeding.\par \par 05/19/2020\par JAY BOWMAN a 54 year F is here today with son for follow up of FIGO IVB endometrial cancer. \par Pt prefers her son translates today's visit. \par Pt denies abdominal pain, vaginal bleeding, NVD, fever, chills. \par She is s/p 2nd cycle of Keytruda, tolerated well. \par  : 31\par \par 6/9/20\par Pt is here for follow up.\par No new complaints.\par Feels well. Denies any abd pain, nausea, vomiting, rash or fever.\par Has mild vag spotting\par \par 06/30/2020\par JAY BOWMAN a 54 year F is here today for follow up of FIGO IVB endometrial cancer. \par S/p 4 cycles of Keytruda.\par She feels well. Denies any new complaints.\par Denies fever, chills, SOB, vaginal bleeding, abdominal pain, distention. \par Last : 29\par CBC reviewed: H/H 13.5/39.2%.\par \par 7/21/2020\par 55 yo female accompanied by her son for follow up visit for endometrial cancer and evaluation prior to Keytruda #6.  She offers no new complaints.  \par Denies any fever, chills, SOB, vaginal bleeding, abdominal pain or distention.  Last : 35/39/68.\par CBC shows wbc=9.11, Hgb=13.8, zqp=349, ANC=4.89.  H=1.39 on 6/30/2020. She was COVID tested 7/17/2020 not detected.\par \par 8/11/20\par Pt is here for follow up.\par No complaints of abd pain, N, v, D or vaginal bleeding.\par Has mild tingling of feet and finger tips.\par No fever.\par \par 9/1/20\par Pt is here for follow up.\par Tolerating Keytruda well.\par Patient denies abdominal pain or bloating, denies vaginal bleeding or discharge.\par Patient denies shortness of breath, denies fever, denies bone pain.\par Has occasional vaginal discomfort.\par \par 9/22/20\par Pt is here  for follow up.\par No new complaints, Denies abdominal pain, N, V, d\par No vag bleeding\par No SOB.\par \par 10/13/20\par Pt is here for follow up.\par No vag bleeding, N, V, D, abd pain.\par No side effects from immunotherapy [de-identified] : Microsatellite status MSI-High §\par Tumor Mutational [place] 42 Muts/Mb §\par ARID1A P427fs*6\par ARID1A F2373cl*15\par ATRX I127fs*7\par CASP8 splice site 305+1G>A\par CCND1 P287A\par CDC73 E325*\par CREBBP A146fs*6\par CTCF T204fs*26\par FGFR3 V630M\par FLCN H429fs*39\par JAK1 K860fs*16\par KRAS G12D\par MET D857bad\par MSH3 K383fs*32\par NFE2L2 E82D\par PDGFRB R919W\par PIK3CA R38C\par PIK3R1 O774rkq\par PTEN V166fs*14\par RNF43 G659fs*41\par TP53 D7H  [de-identified] : 54 yof, PMH of DM II, presented to ED in 10/2019 after experiencing worsening of chronic, intermittent pelvic pain and vaginal bleeding.  Patient underwent transvaginal ultrasound, which showed 3.5 x 2.7 x 4.4 cm cervical mass.  CT abdomen/pelvis revealed R iliac chain Ln 1.5 x 1.4 cm, along with 2.2 x 1.8 cm portacaval LNs, and enlargement of uterus.  Endometrial biopsy revealed well to moderately differentiated endometrioid cancer with focal squamous differentiation and focal clear cell changes.  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   CT chest negative for evidence of intrathoracic metastases.  Patient was initially scheduled for ALESHA/BSO on 11/25/2019.  However, MRI pelvis revealed L adnexal peritoneal tumor implants, enlarged pelvic and retroperitoneal lymph nodes, FIGO stage IVB.  LAESHA/BSO was subsequently canceled.  Patient recently saw Dr. Perez, who recommended chemotherapy alone.  Patient is currently only having occasional, mild vaginal bleeding and intermittent pelvic pain, which responds to tylenol.\par \par Family history is significant for history of uterine cancer in paternal grandmother (per patient, diagnosed at around 55 years) and "blood cancer" in maternal grandmother.\par \par Patient lives with her family and is fully functional at baseline.  She used to work as a  but is now no longer working.  She is also is in the process of applying for Medicaid.

## 2020-10-16 NOTE — ASSESSMENT
[FreeTextEntry1] : 54 yof, PMH of DM II,  diagnosed with FIGO IVB well to moderately differentiated endometrioid cancer with focal squamous differentiation and focal clear cell changes.  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 FIGO IVB endometrial cancer\par - s/p 4 cycles of  carboplatin (AUC 6) and taxol (175 mg/m2) every 3 weeks. \par \par - Was switched from carboplatin/taxol to Keytruda.  NGS testing sent and reviewed.\par \par Ct scan from 8/22/20 shows stable diseases, pt is clinically stable, will continue on immunotherapy with repeat assessment in 3 months\par Proceed with cycle # 10 of Keytruda IV: 200 mg once every 3 weeks until disease progression or unacceptable toxicity. \par - Labs ordered CBC, CMP, , ca125, \par Side effects discussed:\par Peripheral edema, cardiac arrhythmia, Fatigue, Pruritus, skin rash, Hyperglycemia, hypoalbuminemia, hyperypertriglyceridemia , hyponatremia , hypophosphatemia, hypocalcemia, decreased serum bicarbonate , hypercholesterolemia hypokalemia, hypoglycemia, hypercalcemia, hypothyroidism , hyperkalemia, Diarrhea , decreased appetite, constipation , abdominal pain , nausea, vomiting hematuria, anemia, lymphocytopenia, leukopenia, neutropenia, thrombocytopenia, hemorrhage, increased INR, prolonged partial thromboplastin time \par abnormal liver enzymes\par \par MONITORING as follows\par  liver enzymes at baseline and periodically during treatment; renal function; thyroid function (at baseline, periodically during treatment and as clinically indicated); glucose; CBC with differential;\par signs/symptoms of colitis, dermatologic toxicity, hypophysitis, thyroid disorders, pneumonitis, infusion reactions\par \par The immunotherapy dose was adjusted according to pt's height, weight, labs and anticipated tolerance.\par The high risk of complications and complexity associated with antineoplastic therapy administration has been explained to the pt and family.\par Treatment will be administered under my direct supervision\par -  Although pathologic findings are not suggestive of Koch Syndrome, family history is significant for history of uterine cancer in paternal grandmother (per patient, diagnosed at around 55 years), we offered  genetic testing - \par  used to explain it to the patient, but patient refused to be tested \par \par 2.  Low serum vitamin D level-started vitamin D2 50,000 units weekly x 8 weeks. completed, continue Vitamin d 2000 units daily\par \par  Follow up with GYN/ONC \par \par RTC in 3 weeks\par \par

## 2020-10-20 ENCOUNTER — APPOINTMENT (OUTPATIENT)
Dept: OPHTHALMOLOGY | Facility: CLINIC | Age: 55
End: 2020-10-20

## 2020-10-26 LAB
CHOLEST SERPL-MCNC: 222 MG/DL
CHOLEST/HDLC SERPL: 4.7 RATIO
CREAT SPEC-SCNC: 16 MG/DL
ESTIMATED AVERAGE GLUCOSE: 209 MG/DL
HBA1C MFR BLD HPLC: 8.9 %
HDLC SERPL-MCNC: 47 MG/DL
LDLC SERPL CALC-MCNC: 145 MG/DL
MICROALBUMIN 24H UR DL<=1MG/L-MCNC: <1.2 MG/DL
MICROALBUMIN/CREAT 24H UR-RTO: NORMAL MG/G
TRIGL SERPL-MCNC: 172 MG/DL

## 2020-10-29 ENCOUNTER — NON-APPOINTMENT (OUTPATIENT)
Age: 55
End: 2020-10-29

## 2020-11-03 ENCOUNTER — APPOINTMENT (OUTPATIENT)
Dept: HEMATOLOGY ONCOLOGY | Facility: CLINIC | Age: 55
End: 2020-11-03
Payer: COMMERCIAL

## 2020-11-03 ENCOUNTER — LABORATORY RESULT (OUTPATIENT)
Age: 55
End: 2020-11-03

## 2020-11-03 ENCOUNTER — OUTPATIENT (OUTPATIENT)
Dept: OUTPATIENT SERVICES | Facility: HOSPITAL | Age: 55
LOS: 1 days | Discharge: HOME | End: 2020-11-03

## 2020-11-03 ENCOUNTER — APPOINTMENT (OUTPATIENT)
Dept: INFUSION THERAPY | Facility: CLINIC | Age: 55
End: 2020-11-03
Payer: COMMERCIAL

## 2020-11-03 VITALS
HEIGHT: 61 IN | SYSTOLIC BLOOD PRESSURE: 113 MMHG | BODY MASS INDEX: 27.75 KG/M2 | HEART RATE: 61 BPM | WEIGHT: 147 LBS | DIASTOLIC BLOOD PRESSURE: 71 MMHG | TEMPERATURE: 99.3 F

## 2020-11-03 DIAGNOSIS — C54.1 MALIGNANT NEOPLASM OF ENDOMETRIUM: ICD-10-CM

## 2020-11-03 DIAGNOSIS — Z51.12 ENCOUNTER FOR ANTINEOPLASTIC IMMUNOTHERAPY: ICD-10-CM

## 2020-11-03 LAB
HCT VFR BLD CALC: 41.1 %
HGB BLD-MCNC: 14.2 G/DL
MCHC RBC-ENTMCNC: 29.3 PG
MCHC RBC-ENTMCNC: 34.5 G/DL
MCV RBC AUTO: 84.9 FL
PLATELET # BLD AUTO: 224 K/UL
PMV BLD: 11.4 FL
RBC # BLD: 4.84 M/UL
RBC # FLD: 13.9 %
WBC # FLD AUTO: 9.95 K/UL

## 2020-11-03 PROCEDURE — 99215 OFFICE O/P EST HI 40 MIN: CPT

## 2020-11-03 RX ORDER — PEMBROLIZUMAB 25 MG/ML
200 INJECTION, SOLUTION INTRAVENOUS ONCE
Refills: 0 | Status: COMPLETED | OUTPATIENT
Start: 2020-11-03 | End: 2020-11-03

## 2020-11-03 RX ADMIN — PEMBROLIZUMAB 216 MILLIGRAM(S): 25 INJECTION, SOLUTION INTRAVENOUS at 13:26

## 2020-11-03 RX ADMIN — PEMBROLIZUMAB 200 MILLIGRAM(S): 25 INJECTION, SOLUTION INTRAVENOUS at 13:55

## 2020-11-03 NOTE — REASON FOR VISIT
[Follow-Up Visit] : a follow-up [[employer] ] : provided by [employer]   [FreeTextEntry1] : [id number] [FreeTextEntry2] : Manish [TWNoteComboBox1] : Lebanese

## 2020-11-03 NOTE — ASSESSMENT
[FreeTextEntry1] : 54 yof, PMH of DM II,  diagnosed with FIGO IVB well to moderately differentiated endometrioid cancer with focal squamous differentiation and focal clear cell changes.  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 FIGO IVB endometrial cancer\par - s/p 4 cycles of  carboplatin (AUC 6) and taxol (175 mg/m2) every 3 weeks. \par \par - Was switched from carboplatin/taxol to Keytruda.  NGS testing sent and reviewed.\par \par Ct scan from 8/22/20 shows stable diseases, pt is clinically stable, will continue on immunotherapy with repeat assessment in 3 months\par Proceed with cycle # 11 of Keytruda IV: 200 mg once every 3 weeks until disease progression or unacceptable toxicity. \par - Labs ordered CBC, CMP, ,\par Side effects discussed:\par Peripheral edema, cardiac arrhythmia, Fatigue, Pruritus, skin rash, Hyperglycemia, hypoalbuminemia, hyperypertriglyceridemia , hyponatremia , hypophosphatemia, hypocalcemia, decreased serum bicarbonate , hypercholesterolemia hypokalemia, hypoglycemia, hypercalcemia, hypothyroidism , hyperkalemia, Diarrhea , decreased appetite, constipation , abdominal pain , nausea, vomiting hematuria, anemia, lymphocytopenia, leukopenia, neutropenia, thrombocytopenia, hemorrhage, increased INR, prolonged partial thromboplastin time \par abnormal liver enzymes\par \par MONITORING as follows\par  liver enzymes at baseline and periodically during treatment; renal function; thyroid function (at baseline, periodically during treatment and as clinically indicated); glucose; CBC with differential;\par signs/symptoms of colitis, dermatologic toxicity, hypophysitis, thyroid disorders, pneumonitis, infusion reactions\par \par The immunotherapy dose was adjusted according to pt's height, weight, labs and anticipated tolerance.\par The high risk of complications and complexity associated with antineoplastic therapy administration has been explained to the pt and family.\par Treatment will be administered under my direct supervision\par -  Although pathologic findings are not suggestive of Koch Syndrome, family history is significant for history of uterine cancer in paternal grandmother (per patient, diagnosed at around 55 years), we offered  genetic testing - \par  used to explain it to the patient, but patient refused to be tested \par \par 2.  Low serum vitamin D level-started vitamin D2 50,000 units weekly x 8 weeks. completed, continue Vitamin d 2000 units daily\par \par  Follow up with GYN/ONC \par \par RTC in 3 weeks\par \par

## 2020-11-03 NOTE — REVIEW OF SYSTEMS
[Fatigue] : fatigue [Vaginal Discharge] : vaginal discharge [Negative] : Heme/Lymph [de-identified] : numbness in fingers

## 2020-11-03 NOTE — HISTORY OF PRESENT ILLNESS
[de-identified] : 54 yof, PMH of DM II, presented to ED in 10/2019 after experiencing worsening of chronic, intermittent pelvic pain and vaginal bleeding.  Patient underwent transvaginal ultrasound, which showed 3.5 x 2.7 x 4.4 cm cervical mass.  CT abdomen/pelvis revealed R iliac chain Ln 1.5 x 1.4 cm, along with 2.2 x 1.8 cm portacaval LNs, and enlargement of uterus.  Endometrial biopsy revealed well to moderately differentiated endometrioid cancer with focal squamous differentiation and focal clear cell changes.  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   CT chest negative for evidence of intrathoracic metastases.  Patient was initially scheduled for ALESHA/BSO on 11/25/2019.  However, MRI pelvis revealed L adnexal peritoneal tumor implants, enlarged pelvic and retroperitoneal lymph nodes, FIGO stage IVB.  ALESHA/BSO was subsequently canceled.  Patient recently saw Dr. Perez, who recommended chemotherapy alone.  Patient is currently only having occasional, mild vaginal bleeding and intermittent pelvic pain, which responds to tylenol.\par \par Family history is significant for history of uterine cancer in paternal grandmother (per patient, diagnosed at around 55 years) and "blood cancer" in maternal grandmother.\par \par Patient lives with her family and is fully functional at baseline.  She used to work as a  but is now no longer working.  She is also is in the process of applying for Medicaid. [de-identified] : Microsatellite status MSI-High §\par Tumor Mutational [place] 42 Muts/Mb §\par ARID1A P427fs*6\par ARID1A Q6411ok*15\par ATRX I127fs*7\par CASP8 splice site 305+1G>A\par CCND1 P287A\par CDC73 E325*\par CREBBP A146fs*6\par CTCF T204fs*26\par FGFR3 V630M\par FLCN H429fs*39\par JAK1 K860fs*16\par KRAS G12D\par MET B304rmb\par MSH3 K383fs*32\par NFE2L2 E82D\par PDGFRB R919W\par PIK3CA R38C\par PIK3R1 E855kgy\par PTEN V166fs*14\par RNF43 G659fs*41\par TP53 D7H  [de-identified] : 1/20/20 : The patient is here for follow up .She received 2 doses of ferraheme prior to chemo . She tolerated carbo, taxol with mild symptoms of nausea and vomiting. She lost her hair , and reported mild numbness of fingers. She reported also minimal vaginal bleed. Her abdominal pain resolved and she is able to ambulate with no problems\par \par 2/10/2020: The patient is here for follow up , she tolerated her second cycle of chemotherapy with no problems , except for mild nausea and vomiting for the first few days. She also reported some mucousy discharge from vagina. She is eating well , no abdominal pain. \par \par 3/2/2020: The patient is here for follow up . She is tolerating chemotherapy with no major side effects except for nausea in the following days.  She had a CT C/A/P after her 3rd cycle which showed mixed response. She denies any vaginal bleed, no rectal pain on defecation, no abdominal pain. \par \par 3/23/2020: The patient is here for follow up. She is tolerating chemotherapy with no major side effects . She is reporting nausea in the following days after chemotherapy. She denies fever, chills, abdominal pain , no constipation or diarrhea . No vaginal bleeding\par \par 04/07/2020\par JAY BOWMAN a 54 year F is here today for follow up of FIGO IVB endometrial cancer.\par Had  Ronnie number [id number].\par S/p 4 cycles of carbo and taxol, tolerating well. Cancelled last chemo appt due to corona virus. \par Reports lower abdominal cramping 2/10 started today. Denies vaginal bleeding today. Did have spotting x 1 since last treatment.\par Denies NVD, fever, chills. \par Pt reports peripheral neuropathy in fingers b/l not bothersome \par Had GYN f/up 3 weeks ago. \par CBC reviewed: WBC 8.6, h/H 12.8/36.9%, , Neutrophils 5.59\par \par 4/28/20: \par History obtained through  Manish from linkedÃ¼. ID# [id number]\par Pt returns for a f/u vist. She is S/P 1st cycle of keytruda. She tolerated it fairly well. Abdominal pain has resolved. She continues to have mild yellowish vaginal discharge. No vaginal bleeding.\par \par 05/19/2020\par JAY BOWMAN a 54 year F is here today with son for follow up of FIGO IVB endometrial cancer. \par Pt prefers her son translates today's visit. \par Pt denies abdominal pain, vaginal bleeding, NVD, fever, chills. \par She is s/p 2nd cycle of Keytruda, tolerated well. \par  : 31\par \par 6/9/20\par Pt is here for follow up.\par No new complaints.\par Feels well. Denies any abd pain, nausea, vomiting, rash or fever.\par Has mild vag spotting\par \par 06/30/2020\par JAY BOWMAN a 54 year F is here today for follow up of FIGO IVB endometrial cancer. \par S/p 4 cycles of Keytruda.\par She feels well. Denies any new complaints.\par Denies fever, chills, SOB, vaginal bleeding, abdominal pain, distention. \par Last : 29\par CBC reviewed: H/H 13.5/39.2%.\par \par 7/21/2020\par 55 yo female accompanied by her son for follow up visit for endometrial cancer and evaluation prior to Keytruda #6.  She offers no new complaints.  \par Denies any fever, chills, SOB, vaginal bleeding, abdominal pain or distention.  Last : 35/39/68.\par CBC shows wbc=9.11, Hgb=13.8, tsd=228, ANC=4.89.  H=1.39 on 6/30/2020. She was COVID tested 7/17/2020 not detected.\par \par 8/11/20\par Pt is here for follow up.\par No complaints of abd pain, N, v, D or vaginal bleeding.\par Has mild tingling of feet and finger tips.\par No fever.\par \par 9/1/20\par Pt is here for follow up.\par Tolerating Keytruda well.\par Patient denies abdominal pain or bloating, denies vaginal bleeding or discharge.\par Patient denies shortness of breath, denies fever, denies bone pain.\par Has occasional vaginal discomfort.\par \par 9/22/20\par Pt is here  for follow up.\par No new complaints, Denies abdominal pain, N, V, d\par No vag bleeding\par No SOB.\par \par 10/13/20\par Pt is here for follow up.\par No vag bleeding, N, V, D, abd pain.\par No side effects from immunotherapy\par \par 11/3/20\par Pt is here for follow up.\par No new complaints.\par No N, V, D, Abd pain.\par No vag bleeding.\par Tolerating immunotherapy well

## 2020-11-04 LAB
ALBUMIN SERPL ELPH-MCNC: 4.3 G/DL
ALP BLD-CCNC: 59 U/L
ALT SERPL-CCNC: 19 U/L
ANION GAP SERPL CALC-SCNC: 14 MMOL/L
AST SERPL-CCNC: 31 U/L
BILIRUB SERPL-MCNC: 0.3 MG/DL
BUN SERPL-MCNC: 11 MG/DL
CALCIUM SERPL-MCNC: 9.5 MG/DL
CANCER AG125 SERPL-ACNC: 28 U/ML
CHLORIDE SERPL-SCNC: 100 MMOL/L
CO2 SERPL-SCNC: 19 MMOL/L
CREAT SERPL-MCNC: 0.6 MG/DL
GLUCOSE SERPL-MCNC: 142 MG/DL
POTASSIUM SERPL-SCNC: 5.9 MMOL/L
PROT SERPL-MCNC: 7.6 G/DL
SODIUM SERPL-SCNC: 133 MMOL/L
TSH SERPL-ACNC: 1.13 UIU/ML

## 2020-11-16 ENCOUNTER — APPOINTMENT (OUTPATIENT)
Dept: PODIATRY | Facility: CLINIC | Age: 55
End: 2020-11-16
Payer: COMMERCIAL

## 2020-11-16 ENCOUNTER — APPOINTMENT (OUTPATIENT)
Dept: OPHTHALMOLOGY | Facility: CLINIC | Age: 55
End: 2020-11-16
Payer: COMMERCIAL

## 2020-11-16 ENCOUNTER — OUTPATIENT (OUTPATIENT)
Dept: OUTPATIENT SERVICES | Facility: HOSPITAL | Age: 55
LOS: 1 days | Discharge: HOME | End: 2020-11-16
Payer: SUBSIDIZED

## 2020-11-16 ENCOUNTER — OUTPATIENT (OUTPATIENT)
Dept: OUTPATIENT SERVICES | Facility: HOSPITAL | Age: 55
LOS: 1 days | Discharge: HOME | End: 2020-11-16

## 2020-11-16 PROCEDURE — 92202 OPSCPY EXTND ON/MAC DRAW: CPT

## 2020-11-16 PROCEDURE — 92134 CPTRZ OPH DX IMG PST SGM RTA: CPT | Mod: 26

## 2020-11-16 PROCEDURE — 99203 OFFICE O/P NEW LOW 30 MIN: CPT

## 2020-11-16 PROCEDURE — 92004 COMPRE OPH EXAM NEW PT 1/>: CPT

## 2020-11-16 NOTE — PROCEDURE
[FreeTextEntry1] : Patient examined, history and chart reviewed\par Discussed risk of DM2 including Calluses, Pre-ulcerative lesions, ulcers, infections, bone infections, and amputation\par patient educated on diabetic foot health and maintenance at length \par Follow up in 3 months or PRN\par \par

## 2020-11-16 NOTE — HISTORY OF PRESENT ILLNESS
[FreeTextEntry1] : JAY BOWMAN   presents on 11/16/2020  for a Diabetic foot examination, patient was referred by PCP. Patient complains of numbness and pain of both feet and denies an acute injury.  Patient has been diabetic for 2 months.  Patient BS was 149 this morning.  Patient denies NVFC and denies SOB.\par

## 2020-11-16 NOTE — PHYSICAL EXAM
[General Appearance - Alert] : alert [General Appearance - In No Acute Distress] : in no acute distress [Ankle Swelling (On Exam)] : not present [Varicose Veins Of Lower Extremities] : not present [No Joint Swelling] : no joint swelling [Normal Foot/Ankle] : Both lower extremities were exposed and visualized. Standing exam demonstrates normal foot posture and alignment. Hindfoot exam shows no hindfoot valgus or varus [Skin Color & Pigmentation] : normal skin color and pigmentation [Skin Turgor] : normal skin turgor [] : no rash [Skin Lesions] : no skin lesions [Foot Ulcer] : no foot ulcer [Skin Induration] : no skin induration [Normal Appearance] : normal in appearance [Tenderness] : not tender [Erythema] : not erythematous [Delayed in the Right Toes] : normal in the toes [Normal in Appearance] : normal in appearance [Normal] : normal [Full ROM] : with full range of motion [Swelling] : not swollen [Delayed in the Left Toes] : normal in the toes [2+] : 2+ in the dorsalis pedis

## 2020-11-18 DIAGNOSIS — H02.88A MEIBOMIAN GLAND DYSFUNCTION RIGHT EYE, UPPER AND LOWER EYELIDS: ICD-10-CM

## 2020-11-18 DIAGNOSIS — H25.13 AGE-RELATED NUCLEAR CATARACT, BILATERAL: ICD-10-CM

## 2020-11-18 DIAGNOSIS — H52.4 PRESBYOPIA: ICD-10-CM

## 2020-11-18 DIAGNOSIS — E11.9 TYPE 2 DIABETES MELLITUS WITHOUT COMPLICATIONS: ICD-10-CM

## 2020-11-24 ENCOUNTER — LABORATORY RESULT (OUTPATIENT)
Age: 55
End: 2020-11-24

## 2020-11-24 ENCOUNTER — APPOINTMENT (OUTPATIENT)
Dept: HEMATOLOGY ONCOLOGY | Facility: CLINIC | Age: 55
End: 2020-11-24
Payer: COMMERCIAL

## 2020-11-24 ENCOUNTER — APPOINTMENT (OUTPATIENT)
Dept: INFUSION THERAPY | Facility: CLINIC | Age: 55
End: 2020-11-24
Payer: COMMERCIAL

## 2020-11-24 VITALS
BODY MASS INDEX: 26.81 KG/M2 | HEART RATE: 59 BPM | SYSTOLIC BLOOD PRESSURE: 99 MMHG | DIASTOLIC BLOOD PRESSURE: 65 MMHG | WEIGHT: 142 LBS | HEIGHT: 61 IN | TEMPERATURE: 98.7 F

## 2020-11-24 LAB
HCT VFR BLD CALC: 41.3 %
HGB BLD-MCNC: 14.3 G/DL
MCHC RBC-ENTMCNC: 29.2 PG
MCHC RBC-ENTMCNC: 34.6 G/DL
MCV RBC AUTO: 84.5 FL
PLATELET # BLD AUTO: 236 K/UL
PMV BLD: 11.1 FL
RBC # BLD: 4.89 M/UL
RBC # FLD: 13.6 %
WBC # FLD AUTO: 9.31 K/UL

## 2020-11-24 PROCEDURE — 99215 OFFICE O/P EST HI 40 MIN: CPT

## 2020-11-24 RX ORDER — PEMBROLIZUMAB 25 MG/ML
200 INJECTION, SOLUTION INTRAVENOUS ONCE
Refills: 0 | Status: COMPLETED | OUTPATIENT
Start: 2020-11-24 | End: 2020-11-24

## 2020-11-24 RX ADMIN — PEMBROLIZUMAB 216 MILLIGRAM(S): 25 INJECTION, SOLUTION INTRAVENOUS at 15:14

## 2020-11-24 RX ADMIN — PEMBROLIZUMAB 200 MILLIGRAM(S): 25 INJECTION, SOLUTION INTRAVENOUS at 15:45

## 2020-11-25 LAB
ALBUMIN SERPL ELPH-MCNC: 4.4 G/DL
ALP BLD-CCNC: 66 U/L
ALT SERPL-CCNC: 16 U/L
ANION GAP SERPL CALC-SCNC: 12 MMOL/L
AST SERPL-CCNC: 19 U/L
BILIRUB SERPL-MCNC: 0.3 MG/DL
BUN SERPL-MCNC: 14 MG/DL
CALCIUM SERPL-MCNC: 9.4 MG/DL
CANCER AG125 SERPL-ACNC: 57 U/ML
CHLORIDE SERPL-SCNC: 101 MMOL/L
CO2 SERPL-SCNC: 22 MMOL/L
CREAT SERPL-MCNC: 0.5 MG/DL
GLUCOSE SERPL-MCNC: 140 MG/DL
POTASSIUM SERPL-SCNC: 4.5 MMOL/L
PROT SERPL-MCNC: 8 G/DL
SODIUM SERPL-SCNC: 135 MMOL/L
TSH SERPL-ACNC: 1.52 UIU/ML

## 2020-12-01 NOTE — ASSESSMENT
[FreeTextEntry1] : 54 yof, PMH of DM II,  diagnosed with FIGO IVB well to moderately differentiated endometrioid cancer with focal squamous differentiation and focal clear cell changes.  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 FIGO IVB endometrial cancer\par - s/p 5 cycles of  carboplatin (AUC 6) and taxol (175 mg/m2) every 3 weeks. \par \par - Was switched from carboplatin/taxol to Keytruda.  NGS testing sent and reviewed.\par \par Ct scan from 8/22/20 shows stable diseases, pt is clinically stable, will continue on immunotherapy with repeat assessment in 3 months\par Proceed with cycle # 12 of Keytruda IV: 200 mg once every 3 weeks until disease progression or unacceptable toxicity. \par - Labs ordered CBC, CMP, \par Side effects discussed:\par Peripheral edema, cardiac arrhythmia, Fatigue, Pruritus, skin rash, Hyperglycemia, hypoalbuminemia, hyperypertriglyceridemia , hyponatremia , hypophosphatemia, hypocalcemia, decreased serum bicarbonate , hypercholesterolemia hypokalemia, hypoglycemia, hypercalcemia, hypothyroidism , hyperkalemia, Diarrhea , decreased appetite, constipation , abdominal pain , nausea, vomiting hematuria, anemia, lymphocytopenia, leukopenia, neutropenia, thrombocytopenia, hemorrhage, increased INR, prolonged partial thromboplastin time \par abnormal liver enzymes\par \par MONITORING as follows\par  liver enzymes at baseline and periodically during treatment; renal function; thyroid function (at baseline, periodically during treatment and as clinically indicated); glucose; CBC with differential;\par signs/symptoms of colitis, dermatologic toxicity, hypophysitis, thyroid disorders, pneumonitis, infusion reactions\par \par The immunotherapy dose was adjusted according to pt's height, weight, labs and anticipated tolerance.\par The high risk of complications and complexity associated with antineoplastic therapy administration has been explained to the pt and family.\par Treatment will be administered under my direct supervision\par -  Although pathologic findings are not suggestive of Koch Syndrome, family history is significant for history of uterine cancer in paternal grandmother (per patient, diagnosed at around 55 years), we offered  genetic testing - \par  used to explain it to the patient, but patient refused to be tested \par \par 2.  Low serum vitamin D level-started vitamin D2 50,000 units weekly x 8 weeks. completed, continue Vitamin d 2000 units daily\par \par  Follow up with GYN/ONC \par \par RTC in 3 weeks\par \par Case was seen and discussed with Dr. Willard who agreed with the assessment and plan.\par

## 2020-12-01 NOTE — REASON FOR VISIT
[Follow-Up Visit] : a follow-up [[employer] ] : provided by [employer]   [FreeTextEntry1] : [id number] [FreeTextEntry2] : Manish [TWNoteComboBox1] : Afghan

## 2020-12-01 NOTE — HISTORY OF PRESENT ILLNESS
[de-identified] : 54 yof, PMH of DM II, presented to ED in 10/2019 after experiencing worsening of chronic, intermittent pelvic pain and vaginal bleeding.  Patient underwent transvaginal ultrasound, which showed 3.5 x 2.7 x 4.4 cm cervical mass.  CT abdomen/pelvis revealed R iliac chain Ln 1.5 x 1.4 cm, along with 2.2 x 1.8 cm portacaval LNs, and enlargement of uterus.  Endometrial biopsy revealed well to moderately differentiated endometrioid cancer with focal squamous differentiation and focal clear cell changes.  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   CT chest negative for evidence of intrathoracic metastases.  Patient was initially scheduled for ALESHA/BSO on 11/25/2019.  However, MRI pelvis revealed L adnexal peritoneal tumor implants, enlarged pelvic and retroperitoneal lymph nodes, FIGO stage IVB.  ALESHA/BSO was subsequently canceled.  Patient recently saw Dr. Perez, who recommended chemotherapy alone.  Patient is currently only having occasional, mild vaginal bleeding and intermittent pelvic pain, which responds to tylenol.\par \par Family history is significant for history of uterine cancer in paternal grandmother (per patient, diagnosed at around 55 years) and "blood cancer" in maternal grandmother.\par \par Patient lives with her family and is fully functional at baseline.  She used to work as a  but is now no longer working.  She is also is in the process of applying for Medicaid. [de-identified] : Microsatellite status MSI-High §\par Tumor Mutational [place] 42 Muts/Mb §\par ARID1A P427fs*6\par ARID1A B8438gg*15\par ATRX I127fs*7\par CASP8 splice site 305+1G>A\par CCND1 P287A\par CDC73 E325*\par CREBBP A146fs*6\par CTCF T204fs*26\par FGFR3 V630M\par FLCN H429fs*39\par JAK1 K860fs*16\par KRAS G12D\par MET M037gwq\par MSH3 K383fs*32\par NFE2L2 E82D\par PDGFRB R919W\par PIK3CA R38C\par PIK3R1 S433kru\par PTEN V166fs*14\par RNF43 G659fs*41\par TP53 D7H  [de-identified] : 1/20/20 : The patient is here for follow up .She received 2 doses of ferraheme prior to chemo . She tolerated carbo, taxol with mild symptoms of nausea and vomiting. She lost her hair , and reported mild numbness of fingers. She reported also minimal vaginal bleed. Her abdominal pain resolved and she is able to ambulate with no problems\par \par 2/10/2020: The patient is here for follow up , she tolerated her second cycle of chemotherapy with no problems , except for mild nausea and vomiting for the first few days. She also reported some mucousy discharge from vagina. She is eating well , no abdominal pain. \par \par 3/2/2020: The patient is here for follow up . She is tolerating chemotherapy with no major side effects except for nausea in the following days.  She had a CT C/A/P after her 3rd cycle which showed mixed response. She denies any vaginal bleed, no rectal pain on defecation, no abdominal pain. \par \par 3/23/2020: The patient is here for follow up. She is tolerating chemotherapy with no major side effects . She is reporting nausea in the following days after chemotherapy. She denies fever, chills, abdominal pain , no constipation or diarrhea . No vaginal bleeding\par \par 04/07/2020\par JAY BOWMAN a 54 year F is here today for follow up of FIGO IVB endometrial cancer.\par Had  Ronnie number [id number].\par S/p 4 cycles of carbo and taxol, tolerating well. Cancelled last chemo appt due to corona virus. \par Reports lower abdominal cramping 2/10 started today. Denies vaginal bleeding today. Did have spotting x 1 since last treatment.\par Denies NVD, fever, chills. \par Pt reports peripheral neuropathy in fingers b/l not bothersome \par Had GYN f/up 3 weeks ago. \par CBC reviewed: WBC 8.6, h/H 12.8/36.9%, , Neutrophils 5.59\par \par 4/28/20: \par History obtained through  Manish from [employer]. ID# [id number]\par Pt returns for a f/u vist. She is S/P 1st cycle of keytruda. She tolerated it fairly well. Abdominal pain has resolved. She continues to have mild yellowish vaginal discharge. No vaginal bleeding.\par \par 05/19/2020\par JAY BOWMAN a 54 year F is here today with son for follow up of FIGO IVB endometrial cancer. \par Pt prefers her son translates today's visit. \par Pt denies abdominal pain, vaginal bleeding, NVD, fever, chills. \par She is s/p 2nd cycle of Keytruda, tolerated well. \par  : 31\par \par 6/9/20\par Pt is here for follow up.\par No new complaints.\par Feels well. Denies any abd pain, nausea, vomiting, rash or fever.\par Has mild vag spotting\par \par 06/30/2020\par JAY BOWMAN a 54 year F is here today for follow up of FIGO IVB endometrial cancer. \par S/p 4 cycles of Keytruda.\par She feels well. Denies any new complaints.\par Denies fever, chills, SOB, vaginal bleeding, abdominal pain, distention. \par Last : 29\par CBC reviewed: H/H 13.5/39.2%.\par \par 7/21/2020\par 55 yo female accompanied by her son for follow up visit for endometrial cancer and evaluation prior to Keytruda #6.  She offers no new complaints.  \par Denies any fever, chills, SOB, vaginal bleeding, abdominal pain or distention.  Last : 35/39/68.\par CBC shows wbc=9.11, Hgb=13.8, xob=175, ANC=4.89.  H=1.39 on 6/30/2020. She was COVID tested 7/17/2020 not detected.\par \par 8/11/20\par Pt is here for follow up.\par No complaints of abd pain, N, v, D or vaginal bleeding.\par Has mild tingling of feet and finger tips.\par No fever.\par \par 9/1/20\par Pt is here for follow up.\par Tolerating Keytruda well.\par Patient denies abdominal pain or bloating, denies vaginal bleeding or discharge.\par Patient denies shortness of breath, denies fever, denies bone pain.\par Has occasional vaginal discomfort.\par \par 9/22/20\par Pt is here  for follow up.\par No new complaints, Denies abdominal pain, N, V, d\par No vag bleeding\par No SOB.\par \par 10/13/20\par Pt is here for follow up.\par No vag bleeding, N, V, D, abd pain.\par No side effects from immunotherapy\par \par 11/3/20\par Pt is here for follow up.\par No new complaints.\par No N, V, D, Abd pain.\par No vag bleeding.\par Tolerating immunotherapy well.\par \par 11/24/2020\par Patient is here today for follow up visit for endometrial cancer.  She is tolerating immunotherapy-  Keytruda well.  She offers no new complaints except for mild paresthesias.  Denies any N/V/D/C or vaginal bleeding.  Last =28.

## 2020-12-01 NOTE — REVIEW OF SYSTEMS
[Fatigue] : fatigue [Vaginal Discharge] : vaginal discharge [Negative] : Heme/Lymph [de-identified] : numbness in fingers

## 2020-12-15 ENCOUNTER — APPOINTMENT (OUTPATIENT)
Dept: HEMATOLOGY ONCOLOGY | Facility: CLINIC | Age: 55
End: 2020-12-15
Payer: COMMERCIAL

## 2020-12-15 ENCOUNTER — APPOINTMENT (OUTPATIENT)
Dept: INFUSION THERAPY | Facility: CLINIC | Age: 55
End: 2020-12-15
Payer: COMMERCIAL

## 2020-12-15 ENCOUNTER — LABORATORY RESULT (OUTPATIENT)
Age: 55
End: 2020-12-15

## 2020-12-15 VITALS
HEART RATE: 64 BPM | BODY MASS INDEX: 26.81 KG/M2 | WEIGHT: 142 LBS | TEMPERATURE: 96.8 F | DIASTOLIC BLOOD PRESSURE: 69 MMHG | HEIGHT: 61 IN | SYSTOLIC BLOOD PRESSURE: 108 MMHG

## 2020-12-15 LAB
HCT VFR BLD CALC: 38.3 %
HGB BLD-MCNC: 13.4 G/DL
MCHC RBC-ENTMCNC: 29.1 PG
MCHC RBC-ENTMCNC: 35 G/DL
MCV RBC AUTO: 83.3 FL
PLATELET # BLD AUTO: 241 K/UL
PMV BLD: 11.8 FL
RBC # BLD: 4.6 M/UL
RBC # FLD: 13.8 %
WBC # FLD AUTO: 10.51 K/UL

## 2020-12-15 PROCEDURE — 99215 OFFICE O/P EST HI 40 MIN: CPT

## 2020-12-15 RX ORDER — PEMBROLIZUMAB 25 MG/ML
200 INJECTION, SOLUTION INTRAVENOUS ONCE
Refills: 0 | Status: COMPLETED | OUTPATIENT
Start: 2020-12-15 | End: 2020-12-15

## 2020-12-15 RX ADMIN — PEMBROLIZUMAB 216 MILLIGRAM(S): 25 INJECTION, SOLUTION INTRAVENOUS at 14:30

## 2020-12-15 NOTE — REVIEW OF SYSTEMS
[Fatigue] : fatigue [Vaginal Discharge] : vaginal discharge [Negative] : Heme/Lymph [de-identified] : numbness in fingers

## 2020-12-15 NOTE — REASON FOR VISIT
[Follow-Up Visit] : a follow-up [[employer] ] : provided by [employer]   [FreeTextEntry1] : [id number] [FreeTextEntry2] : Manish [TWNoteComboBox1] : [place]

## 2020-12-15 NOTE — ASSESSMENT
[FreeTextEntry1] : 54 yof, PMH of DM II,  diagnosed with FIGO IVB well to moderately differentiated endometrioid cancer with focal squamous differentiation and focal clear cell changes.  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 FIGO IVB endometrial cancer\par - s/p 5 cycles of  carboplatin (AUC 6) and taxol (175 mg/m2) every 3 weeks. \par \par - Was switched from carboplatin/taxol to Keytruda.  NGS testing sent and reviewed.\par \par Ct scan from 8/22/20 shows stable diseases, pt is clinically stable, will continue on immunotherapy with repeat assessment in 3 months\par Proceed with cycle # 13 of Keytruda IV: 200 mg once every 3 weeks until disease progression or unacceptable toxicity. \par - Labs ordered CBC, CMP, \par Side effects discussed:\par Peripheral edema, cardiac arrhythmia, Fatigue, Pruritus, skin rash, Hyperglycemia, hypoalbuminemia, hyperypertriglyceridemia , hyponatremia , hypophosphatemia, hypocalcemia, decreased serum bicarbonate , hypercholesterolemia hypokalemia, hypoglycemia, hypercalcemia, hypothyroidism , hyperkalemia, Diarrhea , decreased appetite, constipation , abdominal pain , nausea, vomiting hematuria, anemia, lymphocytopenia, leukopenia, neutropenia, thrombocytopenia, hemorrhage, increased INR, prolonged partial thromboplastin time \par abnormal liver enzymes\par \par MONITORING as follows\par  liver enzymes at baseline and periodically during treatment; renal function; thyroid function (at baseline, periodically during treatment and as clinically indicated); glucose; CBC with differential;\par signs/symptoms of colitis, dermatologic toxicity, hypophysitis, thyroid disorders, pneumonitis, infusion reactions\par \par The immunotherapy dose was adjusted according to pt's height, weight, labs and anticipated tolerance.\par The high risk of complications and complexity associated with antineoplastic therapy administration has been explained to the pt and family.\par Treatment will be administered under my direct supervision\par -  Although pathologic findings are not suggestive of Koch Syndrome, family history is significant for history of uterine cancer in paternal grandmother (per patient, diagnosed at around 55 years), we offered  genetic testing - \par  used to explain it to the patient, but patient refused to be tested \par \par 2.  Low serum vitamin D level-started vitamin D2 50,000 units weekly x 8 weeks. completed, continue Vitamin d 2000 units daily\par \par Reminded pt to complete the CT scans\par  Follow up with GYN/ONC \par \par RTC in 3 weeks\par \par \par

## 2020-12-16 LAB
ALBUMIN SERPL ELPH-MCNC: 4.2 G/DL
ALP BLD-CCNC: 70 U/L
ALT SERPL-CCNC: 16 U/L
ANION GAP SERPL CALC-SCNC: 12 MMOL/L
AST SERPL-CCNC: 18 U/L
BILIRUB SERPL-MCNC: 0.2 MG/DL
BUN SERPL-MCNC: 11 MG/DL
CALCIUM SERPL-MCNC: 10 MG/DL
CHLORIDE SERPL-SCNC: 96 MMOL/L
CO2 SERPL-SCNC: 24 MMOL/L
CREAT SERPL-MCNC: 0.6 MG/DL
GLUCOSE SERPL-MCNC: 167 MG/DL
POTASSIUM SERPL-SCNC: 4.5 MMOL/L
PROT SERPL-MCNC: 7.5 G/DL
SODIUM SERPL-SCNC: 132 MMOL/L
TSH SERPL-ACNC: 1.59 UIU/ML

## 2021-01-02 ENCOUNTER — OUTPATIENT (OUTPATIENT)
Dept: OUTPATIENT SERVICES | Facility: HOSPITAL | Age: 56
LOS: 1 days | Discharge: HOME | End: 2021-01-02
Payer: SUBSIDIZED

## 2021-01-02 ENCOUNTER — RESULT REVIEW (OUTPATIENT)
Age: 56
End: 2021-01-02

## 2021-01-02 DIAGNOSIS — C54.1 MALIGNANT NEOPLASM OF ENDOMETRIUM: ICD-10-CM

## 2021-01-02 DIAGNOSIS — Z51.12 ENCOUNTER FOR ANTINEOPLASTIC IMMUNOTHERAPY: ICD-10-CM

## 2021-01-02 PROCEDURE — 74177 CT ABD & PELVIS W/CONTRAST: CPT | Mod: 26

## 2021-01-02 PROCEDURE — 71260 CT THORAX DX C+: CPT | Mod: 26

## 2021-01-05 ENCOUNTER — LABORATORY RESULT (OUTPATIENT)
Age: 56
End: 2021-01-05

## 2021-01-05 ENCOUNTER — APPOINTMENT (OUTPATIENT)
Dept: INFUSION THERAPY | Facility: CLINIC | Age: 56
End: 2021-01-05
Payer: COMMERCIAL

## 2021-01-05 ENCOUNTER — OUTPATIENT (OUTPATIENT)
Dept: OUTPATIENT SERVICES | Facility: HOSPITAL | Age: 56
LOS: 1 days | Discharge: HOME | End: 2021-01-05

## 2021-01-05 ENCOUNTER — APPOINTMENT (OUTPATIENT)
Dept: HEMATOLOGY ONCOLOGY | Facility: CLINIC | Age: 56
End: 2021-01-05
Payer: COMMERCIAL

## 2021-01-05 VITALS
WEIGHT: 136 LBS | TEMPERATURE: 96.6 F | DIASTOLIC BLOOD PRESSURE: 75 MMHG | SYSTOLIC BLOOD PRESSURE: 104 MMHG | HEIGHT: 60 IN | BODY MASS INDEX: 26.7 KG/M2 | HEART RATE: 71 BPM

## 2021-01-05 DIAGNOSIS — C54.1 MALIGNANT NEOPLASM OF ENDOMETRIUM: ICD-10-CM

## 2021-01-05 LAB
HCT VFR BLD CALC: 39.7 %
HGB BLD-MCNC: 14 G/DL
MCHC RBC-ENTMCNC: 29.1 PG
MCHC RBC-ENTMCNC: 35.3 G/DL
MCV RBC AUTO: 82.5 FL
PLATELET # BLD AUTO: 245 K/UL
PMV BLD: 11.5 FL
RBC # BLD: 4.81 M/UL
RBC # FLD: 14.3 %
WBC # FLD AUTO: 9.68 K/UL

## 2021-01-05 PROCEDURE — 99215 OFFICE O/P EST HI 40 MIN: CPT

## 2021-01-05 RX ORDER — PEMBROLIZUMAB 25 MG/ML
200 INJECTION, SOLUTION INTRAVENOUS ONCE
Refills: 0 | Status: COMPLETED | OUTPATIENT
Start: 2021-01-05 | End: 2021-01-05

## 2021-01-05 RX ORDER — INFLUENZA VIRUS VACCINE 15; 15; 15; 15 UG/.5ML; UG/.5ML; UG/.5ML; UG/.5ML
0.5 SUSPENSION INTRAMUSCULAR ONCE
Refills: 0 | Status: COMPLETED | OUTPATIENT
Start: 2021-01-05 | End: 2021-01-05

## 2021-01-05 RX ADMIN — PEMBROLIZUMAB 216 MILLIGRAM(S): 25 INJECTION, SOLUTION INTRAVENOUS at 13:37

## 2021-01-05 RX ADMIN — PEMBROLIZUMAB 200 MILLIGRAM(S): 25 INJECTION, SOLUTION INTRAVENOUS at 14:09

## 2021-01-05 RX ADMIN — INFLUENZA VIRUS VACCINE 0.5 MILLILITER(S): 15; 15; 15; 15 SUSPENSION INTRAMUSCULAR at 13:58

## 2021-01-05 NOTE — REVIEW OF SYSTEMS
[Fatigue] : fatigue [Vaginal Discharge] : vaginal discharge [Negative] : Heme/Lymph [de-identified] : numbness in fingers

## 2021-01-05 NOTE — HISTORY OF PRESENT ILLNESS
[de-identified] : 54 yof, PMH of DM II, presented to ED in 10/2019 after experiencing worsening of chronic, intermittent pelvic pain and vaginal bleeding.  Patient underwent transvaginal ultrasound, which showed 3.5 x 2.7 x 4.4 cm cervical mass.  CT abdomen/pelvis revealed R iliac chain Ln 1.5 x 1.4 cm, along with 2.2 x 1.8 cm portacaval LNs, and enlargement of uterus.  Endometrial biopsy revealed well to moderately differentiated endometrioid cancer with focal squamous differentiation and focal clear cell changes.  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   CT chest negative for evidence of intrathoracic metastases.  Patient was initially scheduled for ALESHA/BSO on 11/25/2019.  However, MRI pelvis revealed L adnexal peritoneal tumor implants, enlarged pelvic and retroperitoneal lymph nodes, FIGO stage IVB.  ALESHA/BSO was subsequently canceled.  Patient recently saw Dr. Perez, who recommended chemotherapy alone.  Patient is currently only having occasional, mild vaginal bleeding and intermittent pelvic pain, which responds to tylenol.\par \par Family history is significant for history of uterine cancer in paternal grandmother (per patient, diagnosed at around 55 years) and "blood cancer" in maternal grandmother.\par \par Patient lives with her family and is fully functional at baseline.  She used to work as a  but is now no longer working.  She is also is in the process of applying for Medicaid. [de-identified] : Microsatellite status MSI-High §\par Tumor Mutational [place] 42 Muts/Mb §\par ARID1A P427fs*6\par ARID1A C5662qt*15\par ATRX I127fs*7\par CASP8 splice site 305+1G>A\par CCND1 P287A\par CDC73 E325*\par CREBBP A146fs*6\par CTCF T204fs*26\par FGFR3 V630M\par FLCN H429fs*39\par JAK1 K860fs*16\par KRAS G12D\par MET W742xoe\par MSH3 K383fs*32\par NFE2L2 E82D\par PDGFRB R919W\par PIK3CA R38C\par PIK3R1 M693bpm\par PTEN V166fs*14\par RNF43 G659fs*41\par TP53 D7H  [de-identified] : 1/20/20 : The patient is here for follow up .She received 2 doses of ferraheme prior to chemo . She tolerated carbo, taxol with mild symptoms of nausea and vomiting. She lost her hair , and reported mild numbness of fingers. She reported also minimal vaginal bleed. Her abdominal pain resolved and she is able to ambulate with no problems\par \par 2/10/2020: The patient is here for follow up , she tolerated her second cycle of chemotherapy with no problems , except for mild nausea and vomiting for the first few days. She also reported some mucousy discharge from vagina. She is eating well , no abdominal pain. \par \par 3/2/2020: The patient is here for follow up . She is tolerating chemotherapy with no major side effects except for nausea in the following days.  She had a CT C/A/P after her 3rd cycle which showed mixed response. She denies any vaginal bleed, no rectal pain on defecation, no abdominal pain. \par \par \par \par 3/23/2020: The patient is here for follow up. She is tolerating chemotherapy with no major side effects . She is reporting nausea in the following days after chemotherapy. She denies fever, chills, abdominal pain , no constipation or diarrhea . No vaginal bleeding\par \par 04/07/2020\par JAY BOWMAN a 54 year F is here today for follow up of FIGO IVB endometrial cancer.\par Had  Ronnie number [id number].\par S/p 4 cycles of carbo and taxol, tolerating well. Cancelled last chemo appt due to corona virus. \par Reports lower abdominal cramping 2/10 started today. Denies vaginal bleeding today. Did have spotting x 1 since last treatment.\par Denies NVD, fever, chills. \par Pt reports peripheral neuropathy in fingers b/l not bothersome \par Had GYN f/up 3 weeks ago. \par CBC reviewed: WBC 8.6, h/H 12.8/36.9%, , Neutrophils 5.59\par \par 4/28/20: \par History obtained through  Manish from [employer]. ID# [id number]\par Pt returns for a f/u vist. She is S/P 1st cycle of keytruda. She tolerated it fairly well. Abdominal pain has resolved. She continues to have mild yellowish vaginal discharge. No vaginal bleeding.\par \par 05/19/2020\par JAY BOWMAN a 54 year F is here today with son for follow up of FIGO IVB endometrial cancer. \par Pt prefers her son translates today's visit. \par Pt denies abdominal pain, vaginal bleeding, NVD, fever, chills. \par She is s/p 2nd cycle of Keytruda, tolerated well. \par  : 31\par \par 6/9/20\par Pt is here for follow up.\par No new complaints.\par Feels well. Denies any abd pain, nausea, vomiting, rash or fever.\par Has mild vag spotting\par \par 06/30/2020\par JAY BOWMAN a 54 year F is here today for follow up of FIGO IVB endometrial cancer. \par S/p 4 cycles of Keytruda.\par She feels well. Denies any new complaints.\par Denies fever, chills, SOB, vaginal bleeding, abdominal pain, distention. \par Last : 29\par CBC reviewed: H/H 13.5/39.2%.\par \par 7/21/2020\par 55 yo female accompanied by her son for follow up visit for endometrial cancer and evaluation prior to Keytruda #6.  She offers no new complaints.  \par Denies any fever, chills, SOB, vaginal bleeding, abdominal pain or distention.  Last : 35/39/68.\par CBC shows wbc=9.11, Hgb=13.8, amr=770, ANC=4.89.  H=1.39 on 6/30/2020. She was COVID tested 7/17/2020 not detected.\par \par 8/11/20\par Pt is here for follow up.\par No complaints of abd pain, N, v, D or vaginal bleeding.\par Has mild tingling of feet and finger tips.\par No fever.\par \par 9/1/20\par Pt is here for follow up.\par Tolerating Keytruda well.\par Patient denies abdominal pain or bloating, denies vaginal bleeding or discharge.\par Patient denies shortness of breath, denies fever, denies bone pain.\par Has occasional vaginal discomfort.\par \par 9/22/20\par Pt is here  for follow up.\par No new complaints, Denies abdominal pain, N, V, d\par No vag bleeding\par No SOB.\par \par 10/13/20\par Pt is here for follow up.\par No vag bleeding, N, V, D, abd pain.\par No side effects from immunotherapy\par \par 11/3/20\par Pt is here for follow up.\par No new complaints.\par No N, V, D, Abd pain.\par No vag bleeding.\par Tolerating immunotherapy well.\par \par 11/24/2020\par Patient is here today for follow up visit for endometrial cancer.  She is tolerating immunotherapy-  Keytruda well.  She offers no new complaints except for mild paresthesias.  Denies any N/V/D/C or vaginal bleeding.  Last =28.\par \par 12/15/20\par Pt is here for follow up visit.\par No complaints.\par Feels well, no abd pain, n. V, diarrhea.\par No vag bleeding.\par has not made appt for Ct scans yet\par \par 1/5/2021\par Pt is her to f/u for endometrial cancer\par No complaints of abdominal pain, or vaginal bleeding\par Feels well with good appetite\par Pt requesting  flu vaccine

## 2021-01-05 NOTE — REASON FOR VISIT
[Follow-Up Visit] : a follow-up [[employer] ] : provided by [employer]   [FreeTextEntry1] : [id number] [FreeTextEntry2] : Manish [TWNoteComboBox1] : [place]

## 2021-01-05 NOTE — ASSESSMENT
[FreeTextEntry1] : 55 yof, PMH of DM II,  diagnosed with FIGO IVB well to moderately differentiated endometrioid cancer with focal squamous differentiation and focal clear cell changes.  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 FIGO IVB endometrial cancer\par - s/p 5 cycles of  carboplatin (AUC 6) and taxol (175 mg/m2) every 3 weeks. \par \par - Was switched from carboplatin/taxol to Keytruda.  NGS testing sent and reviewed.\par \par Ct scan from1/2/21  reviewed with pt shows stable disease and some improvement in other areas.\par pt is clinically stable, will continue on immunotherapy with repeat assessment in 3 months\par Proceed with cycle # 14 of Keytruda IV: 200 mg once every 3 weeks until disease progression or unacceptable toxicity. \par - Labs ordered CBC, CMP, \par Side effects discussed:\par Peripheral edema, cardiac arrhythmia, Fatigue, Pruritus, skin rash, Hyperglycemia, hypoalbuminemia, hyperypertriglyceridemia , hyponatremia , hypophosphatemia, hypocalcemia, decreased serum bicarbonate , hypercholesterolemia hypokalemia, hypoglycemia, hypercalcemia, hypothyroidism , hyperkalemia, Diarrhea , decreased appetite, constipation , abdominal pain , nausea, vomiting hematuria, anemia, lymphocytopenia, leukopenia, neutropenia, thrombocytopenia, hemorrhage, increased INR, prolonged partial thromboplastin time \par abnormal liver enzymes\par \par MONITORING as follows\par  liver enzymes at baseline and periodically during treatment; renal function; thyroid function (at baseline, periodically during treatment and as clinically indicated); glucose; CBC with differential;\par signs/symptoms of colitis, dermatologic toxicity, hypophysitis, thyroid disorders, pneumonitis, infusion reactions\par \par The immunotherapy dose was adjusted according to pt's height, weight, labs and anticipated tolerance.\par The high risk of complications and complexity associated with antineoplastic therapy administration has been explained to the pt and family.\par Treatment will be administered under my direct supervision\par labs ordered CMP, TSH, Ca125, \par -  Although pathologic findings are not suggestive of Koch Syndrome, family history is significant for history of uterine cancer in paternal grandmother (per patient, diagnosed at around 55 years), we offered  genetic testing - \par  used to explain it to the patient, but patient refused to be tested \par \par 2.  Low serum vitamin D level-started vitamin D2 50,000 units weekly x 8 weeks. completed, continue Vitamin d 2000 units daily\par \par 3. Flu vaccine to be administered today\par \par \par  Follow up with GYN/ONC \par \par RTC in 3 weeks\par \par \par

## 2021-01-06 LAB
ALBUMIN SERPL ELPH-MCNC: 4.3 G/DL
ALP BLD-CCNC: 80 U/L
ALT SERPL-CCNC: 18 U/L
ANION GAP SERPL CALC-SCNC: 10 MMOL/L
AST SERPL-CCNC: 15 U/L
BILIRUB SERPL-MCNC: 0.5 MG/DL
BUN SERPL-MCNC: 11 MG/DL
CALCIUM SERPL-MCNC: 9.2 MG/DL
CANCER AG125 SERPL-ACNC: 32 U/ML
CEA SERPL-MCNC: 2.7 NG/ML
CHLORIDE SERPL-SCNC: 98 MMOL/L
CO2 SERPL-SCNC: 24 MMOL/L
CREAT SERPL-MCNC: 0.6 MG/DL
GLUCOSE SERPL-MCNC: 351 MG/DL
POTASSIUM SERPL-SCNC: 5.1 MMOL/L
PROT SERPL-MCNC: 7.5 G/DL
SODIUM SERPL-SCNC: 132 MMOL/L
TSH SERPL-ACNC: 1.91 UIU/ML

## 2021-01-15 ENCOUNTER — APPOINTMENT (OUTPATIENT)
Dept: GYNECOLOGIC ONCOLOGY | Facility: CLINIC | Age: 56
End: 2021-01-15
Payer: COMMERCIAL

## 2021-01-15 ENCOUNTER — OUTPATIENT (OUTPATIENT)
Dept: OUTPATIENT SERVICES | Facility: HOSPITAL | Age: 56
LOS: 1 days | Discharge: HOME | End: 2021-01-15

## 2021-01-15 PROCEDURE — 99214 OFFICE O/P EST MOD 30 MIN: CPT

## 2021-01-15 NOTE — HISTORY OF PRESENT ILLNESS
[FreeTextEntry1] : Desires Haitian translation from  her son.\par 54 yo postmenopausal, presents for follow up, with stage IVB moderately well differentiated endometrial carcinoma presents for follow up. Pt was seen 11/2019 with diagnosis with plan for surgery, however extensive disease and refusal of blood products at that time resulted in a  plan for chemo first. Was seen by rad onc, also recommended chemo. She is s/p 4 cycles of chemo, now on keytruda as she had a reaction to the chemo.\par \par Diagnosis: endometrioid carcinoma stage IVB\par Surgery: none\par Treatment: carbo/taxol x4 cycles, now on keytruda\par \par  (1/6/2020): 32\par \par Imaging:\par CT A/P (1/5/2020) - No new sites of intrathoracic or intra-abdominal metastasis, no new suspicious LAD. \par Unchanged retroperitoneal LN measuring 1.2cm short axis. Decreased left iliac chain LAD. Resolved previously described presacral prominent LN.\par Interval decreased intrauterine mass, difficulty to measure on modality. Decreased soft tissue nodularity posterior to the uterus.\par \par \par Last mammogram: 1/2020: BIRADS2\par Last colonoscopy - not done

## 2021-01-15 NOTE — PHYSICAL EXAM
[Normal] : Bimanual Exam: Normal [de-identified] : soft, non tender, non distended, no palpable masses, no R/G/R [de-identified] : Speculum: normal appearing vaginal and cervix, no lesions or masses, no bleeding or discharge. No CMT, Uterus mobile.

## 2021-01-15 NOTE — DISCUSSION/SUMMARY
[FreeTextEntry1] : 56 yo P2, postmenopausal, presents for follow up and discussion of surgical management. \par -Pt has responded well initially to chemo, now on keytruda and continues to respond well. Based on imaging and pelvic exam, interval surgery is now reasonable. \par -She is refusing blood products, Hg now 14\par -RATLH-BSO, staging discussed. R/B/A discussed, pt voiced understanding. \par -Plan for 2/15\par -Medical clearances. \par -Will inform heme-onc of plan for surgery

## 2021-01-21 ENCOUNTER — OUTPATIENT (OUTPATIENT)
Dept: OUTPATIENT SERVICES | Facility: HOSPITAL | Age: 56
LOS: 1 days | Discharge: HOME | End: 2021-01-21
Payer: SUBSIDIZED

## 2021-01-21 ENCOUNTER — APPOINTMENT (OUTPATIENT)
Dept: INTERNAL MEDICINE | Facility: CLINIC | Age: 56
End: 2021-01-21
Payer: COMMERCIAL

## 2021-01-21 VITALS
BODY MASS INDEX: 26.31 KG/M2 | WEIGHT: 134 LBS | DIASTOLIC BLOOD PRESSURE: 70 MMHG | OXYGEN SATURATION: 97 % | SYSTOLIC BLOOD PRESSURE: 106 MMHG | HEART RATE: 63 BPM | HEIGHT: 60 IN | TEMPERATURE: 97.9 F

## 2021-01-21 DIAGNOSIS — E78.5 HYPERLIPIDEMIA, UNSPECIFIED: ICD-10-CM

## 2021-01-21 DIAGNOSIS — Z01.818 ENCOUNTER FOR OTHER PREPROCEDURAL EXAMINATION: ICD-10-CM

## 2021-01-21 LAB — GLUCOSE BLDC GLUCOMTR-MCNC: 278 MG/DL — HIGH (ref 70–99)

## 2021-01-21 PROCEDURE — 93010 ELECTROCARDIOGRAM REPORT: CPT

## 2021-01-21 PROCEDURE — 99214 OFFICE O/P EST MOD 30 MIN: CPT | Mod: GC

## 2021-01-21 RX ORDER — ONDANSETRON 8 MG/1
8 TABLET ORAL
Qty: 35 | Refills: 3 | Status: COMPLETED | COMMUNITY
Start: 2019-12-16 | End: 2021-01-21

## 2021-01-21 RX ORDER — MULTIVIT-MIN/IRON/FOLIC ACID/K 18-600-40
50 MCG CAPSULE ORAL
Qty: 30 | Refills: 3 | Status: COMPLETED | COMMUNITY
Start: 2020-05-19 | End: 2021-01-21

## 2021-01-21 RX ORDER — METFORMIN HYDROCHLORIDE 1000 MG/1
1000 TABLET, COATED ORAL
Qty: 60 | Refills: 5 | Status: ACTIVE | COMMUNITY
Start: 2018-10-27 | End: 1900-01-01

## 2021-01-21 RX ORDER — PROCHLORPERAZINE MALEATE 10 MG/1
10 TABLET ORAL EVERY 6 HOURS
Qty: 30 | Refills: 3 | Status: COMPLETED | COMMUNITY
Start: 2019-12-16 | End: 2021-01-21

## 2021-01-21 RX ORDER — OXYCODONE 5 MG/1
5 TABLET ORAL
Qty: 60 | Refills: 0 | Status: COMPLETED | COMMUNITY
Start: 2019-12-13 | End: 2021-01-21

## 2021-01-21 RX ORDER — GLIPIZIDE 5 MG/1
5 TABLET ORAL DAILY
Qty: 30 | Refills: 5 | Status: ACTIVE | COMMUNITY
Start: 2021-01-21 | End: 1900-01-01

## 2021-01-21 RX ORDER — ACYCLOVIR 50 MG/G
5 OINTMENT TOPICAL 3 TIMES DAILY
Qty: 1 | Refills: 0 | Status: COMPLETED | COMMUNITY
Start: 2020-01-20 | End: 2021-01-21

## 2021-01-21 NOTE — REVIEW OF SYSTEMS
[Recent Change In Weight] : ~T recent weight change [Negative] : Heme/Lymph [Fever] : no fever [Chills] : no chills [Fatigue] : no fatigue [Hot Flashes] : no hot flashes [Night Sweats] : no night sweats [FreeTextEntry2] : lost weight about 5 lbs in last 3 months

## 2021-01-21 NOTE — HISTORY OF PRESENT ILLNESS
[Family Member] : family member [FreeTextEntry1] : pre-op risk stratification for total laparoscopic hysterectomy [de-identified] : 54 yo F with PMHx DLD, DM II, adenocarcinoma of the uterus stage 4 (on immunotherapy s/p chemotherapy - s/p 4 cycles of carboplatin and taxol) is here for follow up and pre-op risk stratification for total laparoscopic hysterectomy. Pt denied any active problems. Per pt she can ambulate 5 blocks and do 2 flights of stairs without any SOB or chest pain.

## 2021-01-21 NOTE — ASSESSMENT
[FreeTextEntry1] : 54 yo F with PMHx DLD, DM II, adenocarcinoma of the uterus stage 4 (on immunotherapy s/p chemotherapy - s/p 4 cycles of carboplatin and taxol) is here for follow up and pre-op risk stratification for total laparoscopic hysterectomy.\par \par # DMII  \par - last hba1c 8.9 % in Oct 2020\par - repeat Aic\par - Fingerstick 278 mg/dl in office\par - Increase metformin to 1000mg BID and start glipizide 5mg qd\par - follows up with opthalmo and poditory  \par \par # adenocarcinoma of the uterus\par - being followed up by onco and GYN-onc\par - on chemotherapy + immunotherapy. \par - Being considered for total laparoscopic hysterectomy.\par - will order pre-op labs, EKG and 2 D echo as pt was on chemo\par - RCRI 1\par - METS>4\par - will reval after w/up\par \par # DLD\par - c/w lifestyle modifications for now\par - will repeat lipid profile\par \par # HCM\par - mammogram Jan 2020: WNL; will give her script for repeat mammogram\par - PAP smear in 2020 WNL\par - Colonoscopy: Pt follows up with GI and had negative Fit test per pt\par \par f/u in 3 months\par

## 2021-01-23 ENCOUNTER — RESULT REVIEW (OUTPATIENT)
Age: 56
End: 2021-01-23

## 2021-01-23 ENCOUNTER — OUTPATIENT (OUTPATIENT)
Dept: OUTPATIENT SERVICES | Facility: HOSPITAL | Age: 56
LOS: 1 days | Discharge: HOME | End: 2021-01-23
Payer: OTHER GOVERNMENT

## 2021-01-23 DIAGNOSIS — Z12.31 ENCOUNTER FOR SCREENING MAMMOGRAM FOR MALIGNANT NEOPLASM OF BREAST: ICD-10-CM

## 2021-01-23 PROCEDURE — 77067 SCR MAMMO BI INCL CAD: CPT | Mod: 26

## 2021-01-23 PROCEDURE — 77063 BREAST TOMOSYNTHESIS BI: CPT | Mod: 26

## 2021-01-25 ENCOUNTER — OUTPATIENT (OUTPATIENT)
Dept: OUTPATIENT SERVICES | Facility: HOSPITAL | Age: 56
LOS: 1 days | Discharge: HOME | End: 2021-01-25

## 2021-01-25 ENCOUNTER — LABORATORY RESULT (OUTPATIENT)
Age: 56
End: 2021-01-25

## 2021-01-25 DIAGNOSIS — Z11.59 ENCOUNTER FOR SCREENING FOR OTHER VIRAL DISEASES: ICD-10-CM

## 2021-01-25 LAB
25(OH)D3 SERPL-MCNC: 38.9 NG/ML
ALBUMIN SERPL ELPH-MCNC: 4.3 G/DL
ALP BLD-CCNC: 66 U/L
ALT SERPL-CCNC: 23 U/L
ANION GAP SERPL CALC-SCNC: 13 MMOL/L
APTT BLD: 41.5 SEC
AST SERPL-CCNC: 19 U/L
BASOPHILS # BLD AUTO: 0.1 K/UL
BASOPHILS NFR BLD AUTO: 1 %
BILIRUB SERPL-MCNC: 0.4 MG/DL
BUN SERPL-MCNC: 16 MG/DL
CALCIUM SERPL-MCNC: 9.4 MG/DL
CHLORIDE SERPL-SCNC: 104 MMOL/L
CHOLEST SERPL-MCNC: 153 MG/DL
CO2 SERPL-SCNC: 22 MMOL/L
CREAT SERPL-MCNC: 0.59 MG/DL
EOSINOPHIL # BLD AUTO: 0.38 K/UL
EOSINOPHIL NFR BLD AUTO: 4 %
ESTIMATED AVERAGE GLUCOSE: 220 MG/DL
GLUCOSE SERPL-MCNC: 213 MG/DL
HBA1C MFR BLD HPLC: 9.3 %
HCT VFR BLD CALC: 41.8 %
HDLC SERPL-MCNC: 46 MG/DL
HGB BLD-MCNC: 13.2 G/DL
IMM GRANULOCYTES NFR BLD AUTO: 0.2 %
INR PPP: 1.05 RATIO
LDLC SERPL CALC-MCNC: 82 MG/DL
LYMPHOCYTES # BLD AUTO: 3.22 K/UL
LYMPHOCYTES NFR BLD AUTO: 33.6 %
MAGNESIUM SERPL-MCNC: 2 MG/DL
MAN DIFF?: NORMAL
MCHC RBC-ENTMCNC: 29.3 PG
MCHC RBC-ENTMCNC: 31.6 GM/DL
MCV RBC AUTO: 92.7 FL
MONOCYTES # BLD AUTO: 0.61 K/UL
MONOCYTES NFR BLD AUTO: 6.4 %
NEUTROPHILS # BLD AUTO: 5.26 K/UL
NEUTROPHILS NFR BLD AUTO: 54.8 %
NONHDLC SERPL-MCNC: 108 MG/DL
PLATELET # BLD AUTO: 236 K/UL
POTASSIUM SERPL-SCNC: 4.4 MMOL/L
PROT SERPL-MCNC: 7.4 G/DL
PT BLD: 12.4 SEC
RBC # BLD: 4.51 M/UL
RBC # FLD: 15.9 %
SODIUM SERPL-SCNC: 138 MMOL/L
TRIGL SERPL-MCNC: 130 MG/DL
TSH SERPL-ACNC: 2.04 UIU/ML
WBC # FLD AUTO: 9.59 K/UL

## 2021-01-26 ENCOUNTER — APPOINTMENT (OUTPATIENT)
Dept: HEMATOLOGY ONCOLOGY | Facility: CLINIC | Age: 56
End: 2021-01-26
Payer: COMMERCIAL

## 2021-01-26 ENCOUNTER — LABORATORY RESULT (OUTPATIENT)
Age: 56
End: 2021-01-26

## 2021-01-26 ENCOUNTER — APPOINTMENT (OUTPATIENT)
Dept: INFUSION THERAPY | Facility: CLINIC | Age: 56
End: 2021-01-26
Payer: COMMERCIAL

## 2021-01-26 VITALS
SYSTOLIC BLOOD PRESSURE: 110 MMHG | BODY MASS INDEX: 26.31 KG/M2 | WEIGHT: 134 LBS | HEART RATE: 71 BPM | TEMPERATURE: 98.7 F | HEIGHT: 60 IN | DIASTOLIC BLOOD PRESSURE: 74 MMHG

## 2021-01-26 LAB
HCT VFR BLD CALC: 37.7 %
HGB BLD-MCNC: 13.1 G/DL
MCHC RBC-ENTMCNC: 29.6 PG
MCHC RBC-ENTMCNC: 34.7 G/DL
MCV RBC AUTO: 85.3 FL
PLATELET # BLD AUTO: 214 K/UL
PMV BLD: 12 FL
RBC # BLD: 4.42 M/UL
RBC # FLD: 14.9 %
WBC # FLD AUTO: 9 K/UL

## 2021-01-26 PROCEDURE — 99215 OFFICE O/P EST HI 40 MIN: CPT

## 2021-01-26 RX ORDER — PEMBROLIZUMAB 25 MG/ML
200 INJECTION, SOLUTION INTRAVENOUS ONCE
Refills: 0 | Status: COMPLETED | OUTPATIENT
Start: 2021-01-26 | End: 2021-01-26

## 2021-01-26 RX ADMIN — PEMBROLIZUMAB 216 MILLIGRAM(S): 25 INJECTION, SOLUTION INTRAVENOUS at 12:45

## 2021-01-26 RX ADMIN — PEMBROLIZUMAB 200 MILLIGRAM(S): 25 INJECTION, SOLUTION INTRAVENOUS at 13:16

## 2021-01-27 ENCOUNTER — FORM ENCOUNTER (OUTPATIENT)
Age: 56
End: 2021-01-27

## 2021-01-28 ENCOUNTER — OUTPATIENT (OUTPATIENT)
Dept: OUTPATIENT SERVICES | Facility: HOSPITAL | Age: 56
LOS: 1 days | Discharge: HOME | End: 2021-01-28
Payer: SUBSIDIZED

## 2021-01-28 DIAGNOSIS — C54.1 MALIGNANT NEOPLASM OF ENDOMETRIUM: ICD-10-CM

## 2021-01-28 DIAGNOSIS — Z00.00 ENCOUNTER FOR GENERAL ADULT MEDICAL EXAMINATION WITHOUT ABNORMAL FINDINGS: ICD-10-CM

## 2021-01-28 DIAGNOSIS — Z01.818 ENCOUNTER FOR OTHER PREPROCEDURAL EXAMINATION: ICD-10-CM

## 2021-01-28 DIAGNOSIS — E11.9 TYPE 2 DIABETES MELLITUS WITHOUT COMPLICATIONS: ICD-10-CM

## 2021-01-28 DIAGNOSIS — E78.5 HYPERLIPIDEMIA, UNSPECIFIED: ICD-10-CM

## 2021-01-28 PROCEDURE — 93306 TTE W/DOPPLER COMPLETE: CPT | Mod: 26

## 2021-02-01 NOTE — REVIEW OF SYSTEMS
[Fatigue] : fatigue [Vaginal Discharge] : vaginal discharge [Negative] : Heme/Lymph [de-identified] : numbness in fingers

## 2021-02-01 NOTE — HISTORY OF PRESENT ILLNESS
[de-identified] : 54 yof, PMH of DM II, presented to ED in 10/2019 after experiencing worsening of chronic, intermittent pelvic pain and vaginal bleeding.  Patient underwent transvaginal ultrasound, which showed 3.5 x 2.7 x 4.4 cm cervical mass.  CT abdomen/pelvis revealed R iliac chain Ln 1.5 x 1.4 cm, along with 2.2 x 1.8 cm portacaval LNs, and enlargement of uterus.  Endometrial biopsy revealed well to moderately differentiated endometrioid cancer with focal squamous differentiation and focal clear cell changes.  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   CT chest negative for evidence of intrathoracic metastases.  Patient was initially scheduled for ALESHA/BSO on 11/25/2019.  However, MRI pelvis revealed L adnexal peritoneal tumor implants, enlarged pelvic and retroperitoneal lymph nodes, FIGO stage IVB.  ALESHA/BSO was subsequently canceled.  Patient recently saw Dr. Perez, who recommended chemotherapy alone.  Patient is currently only having occasional, mild vaginal bleeding and intermittent pelvic pain, which responds to tylenol.\par \par Family history is significant for history of uterine cancer in paternal grandmother (per patient, diagnosed at around 55 years) and "blood cancer" in maternal grandmother.\par \par Patient lives with her family and is fully functional at baseline.  She used to work as a  but is now no longer working.  She is also is in the process of applying for Medicaid. [de-identified] : Microsatellite status MSI-High §\par Tumor Mutational [place] 42 Muts/Mb §\par ARID1A P427fs*6\par ARID1A W1705sr*15\par ATRX I127fs*7\par CASP8 splice site 305+1G>A\par CCND1 P287A\par CDC73 E325*\par CREBBP A146fs*6\par CTCF T204fs*26\par FGFR3 V630M\par FLCN H429fs*39\par JAK1 K860fs*16\par KRAS G12D\par MET J049hwe\par MSH3 K383fs*32\par NFE2L2 E82D\par PDGFRB R919W\par PIK3CA R38C\par PIK3R1 X243jml\par PTEN V166fs*14\par RNF43 G659fs*41\par TP53 D7H  [de-identified] : 1/20/20 : The patient is here for follow up .She received 2 doses of ferraheme prior to chemo . She tolerated carbo, taxol with mild symptoms of nausea and vomiting. She lost her hair , and reported mild numbness of fingers. She reported also minimal vaginal bleed. Her abdominal pain resolved and she is able to ambulate with no problems\par \par 2/10/2020: The patient is here for follow up , she tolerated her second cycle of chemotherapy with no problems , except for mild nausea and vomiting for the first few days. She also reported some mucousy discharge from vagina. She is eating well , no abdominal pain. \par \par 3/2/2020: The patient is here for follow up . She is tolerating chemotherapy with no major side effects except for nausea in the following days.  She had a CT C/A/P after her 3rd cycle which showed mixed response. She denies any vaginal bleed, no rectal pain on defecation, no abdominal pain. \par \par \par \par 3/23/2020: The patient is here for follow up. She is tolerating chemotherapy with no major side effects . She is reporting nausea in the following days after chemotherapy. She denies fever, chills, abdominal pain , no constipation or diarrhea . No vaginal bleeding\par \par 04/07/2020\par JAY BOWMAN a 54 year F is here today for follow up of FIGO IVB endometrial cancer.\par Had  Ronnie number [id number].\par S/p 4 cycles of carbo and taxol, tolerating well. Cancelled last chemo appt due to corona virus. \par Reports lower abdominal cramping 2/10 started today. Denies vaginal bleeding today. Did have spotting x 1 since last treatment.\par Denies NVD, fever, chills. \par Pt reports peripheral neuropathy in fingers b/l not bothersome \par Had GYN f/up 3 weeks ago. \par CBC reviewed: WBC 8.6, h/H 12.8/36.9%, , Neutrophils 5.59\par \par 4/28/20: \par History obtained through  Manish from [facility]. ID# [id number]\par Pt returns for a f/u vist. She is S/P 1st cycle of keytruda. She tolerated it fairly well. Abdominal pain has resolved. She continues to have mild yellowish vaginal discharge. No vaginal bleeding.\par \par 05/19/2020\par JAY BOWMAN a 54 year F is here today with son for follow up of FIGO IVB endometrial cancer. \par Pt prefers her son translates today's visit. \par Pt denies abdominal pain, vaginal bleeding, NVD, fever, chills. \par She is s/p 2nd cycle of Keytruda, tolerated well. \par  : 31\par \par 6/9/20\par Pt is here for follow up.\par No new complaints.\par Feels well. Denies any abd pain, nausea, vomiting, rash or fever.\par Has mild vag spotting\par \par 06/30/2020\par JAY BOWMAN a 54 year F is here today for follow up of FIGO IVB endometrial cancer. \par S/p 4 cycles of Keytruda.\par She feels well. Denies any new complaints.\par Denies fever, chills, SOB, vaginal bleeding, abdominal pain, distention. \par Last : 29\par CBC reviewed: H/H 13.5/39.2%.\par \par 7/21/2020\par 55 yo female accompanied by her son for follow up visit for endometrial cancer and evaluation prior to Keytruda #6.  She offers no new complaints.  \par Denies any fever, chills, SOB, vaginal bleeding, abdominal pain or distention.  Last : 35/39/68.\par CBC shows wbc=9.11, Hgb=13.8, jiq=500, ANC=4.89.  H=1.39 on 6/30/2020. She was COVID tested 7/17/2020 not detected.\par \par 8/11/20\par Pt is here for follow up.\par No complaints of abd pain, N, v, D or vaginal bleeding.\par Has mild tingling of feet and finger tips.\par No fever.\par \par 9/1/20\par Pt is here for follow up.\par Tolerating Keytruda well.\par Patient denies abdominal pain or bloating, denies vaginal bleeding or discharge.\par Patient denies shortness of breath, denies fever, denies bone pain.\par Has occasional vaginal discomfort.\par \par 9/22/20\par Pt is here  for follow up.\par No new complaints, Denies abdominal pain, N, V, d\par No vag bleeding\par No SOB.\par \par 10/13/20\par Pt is here for follow up.\par No vag bleeding, N, V, D, abd pain.\par No side effects from immunotherapy\par \par 11/3/20\par Pt is here for follow up.\par No new complaints.\par No N, V, D, Abd pain.\par No vag bleeding.\par Tolerating immunotherapy well.\par \par 11/24/2020\par Patient is here today for follow up visit for endometrial cancer.  She is tolerating immunotherapy-  Keytruda well.  She offers no new complaints except for mild paresthesias.  Denies any N/V/D/C or vaginal bleeding.  Last =28.\par \par 12/15/20\par Pt is here for follow up visit.\par No complaints.\par Feels well, no abd pain, n. V, diarrhea.\par No vag bleeding.\par has not made appt for Ct scans yet\par \par 1/5/2021\par Pt is her to f/u for endometrial cancer\par No complaints of abdominal pain, or vaginal bleeding\par Feels well with good appetite\par Pt requesting  flu vaccine\par \par 1/26/21\par Pt is here for follow up. Feels well.\par Saw GYN and was offered interval debulking scheduled for 2/15\par No vag bleeding

## 2021-02-01 NOTE — ASSESSMENT
[FreeTextEntry1] : 55 yof, PMH of DM II,  diagnosed with FIGO IVB well to moderately differentiated endometrioid cancer with focal squamous differentiation and focal clear cell changes.  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 FIGO IVB endometrial cancer\par - s/p 5 cycles of  carboplatin (AUC 6) and taxol (175 mg/m2) every 3 weeks. \par \par - Was switched from carboplatin/taxol to Keytruda.  NGS testing sent and reviewed.\par \par will continue on immunotherapy. Pt going for surgery on 2/15.\par Proceed with cycle # 15 of Keytruda IV: 200 mg once every 3 weeks until disease progression or unacceptable toxicity. \par - Labs ordered CBC, CMP, \par Side effects discussed:\par Peripheral edema, cardiac arrhythmia, Fatigue, Pruritus, skin rash, Hyperglycemia, hypoalbuminemia, hyperypertriglyceridemia , hyponatremia , hypophosphatemia, hypocalcemia, decreased serum bicarbonate , hypercholesterolemia hypokalemia, hypoglycemia, hypercalcemia, hypothyroidism , hyperkalemia, Diarrhea , decreased appetite, constipation , abdominal pain , nausea, vomiting hematuria, anemia, lymphocytopenia, leukopenia, neutropenia, thrombocytopenia, hemorrhage, increased INR, prolonged partial thromboplastin time \par abnormal liver enzymes\par \par MONITORING as follows\par  liver enzymes at baseline and periodically during treatment; renal function; thyroid function (at baseline, periodically during treatment and as clinically indicated); glucose; CBC with differential;\par signs/symptoms of colitis, dermatologic toxicity, hypophysitis, thyroid disorders, pneumonitis, infusion reactions\par \par The immunotherapy dose was adjusted according to pt's height, weight, labs and anticipated tolerance.\par The high risk of complications and complexity associated with antineoplastic therapy administration has been explained to the pt and family.\par Treatment will be administered under my direct supervision\par labs ordered CMP, TSH, Ca125, \par -  Although pathologic findings are not suggestive of Koch Syndrome, family history is significant for history of uterine cancer in paternal grandmother (per patient, diagnosed at around 55 years), we offered  genetic testing - \par  used to explain it to the patient, but patient refused to be tested \par \par 2.  Low serum vitamin D level-started vitamin D2 50,000 units weekly x 8 weeks. completed, continue Vitamin d 2000 units daily\par \par \par \par \par Follow up after surgery\par \par RTC in 3 weeks\par \par \par

## 2021-02-01 NOTE — REASON FOR VISIT
[Follow-Up Visit] : a follow-up [[employer] ] : provided by [employer]   [FreeTextEntry1] : [id number] [FreeTextEntry2] : Manish [TWNoteComboBox1] : [place]

## 2021-02-09 DIAGNOSIS — C54.1 MALIGNANT NEOPLASM OF ENDOMETRIUM: ICD-10-CM

## 2021-02-11 ENCOUNTER — APPOINTMENT (OUTPATIENT)
Dept: PODIATRY | Facility: CLINIC | Age: 56
End: 2021-02-11

## 2021-02-13 ENCOUNTER — LABORATORY RESULT (OUTPATIENT)
Age: 56
End: 2021-02-13

## 2021-02-13 ENCOUNTER — OUTPATIENT (OUTPATIENT)
Dept: OUTPATIENT SERVICES | Facility: HOSPITAL | Age: 56
LOS: 1 days | Discharge: HOME | End: 2021-02-13

## 2021-02-13 DIAGNOSIS — Z11.59 ENCOUNTER FOR SCREENING FOR OTHER VIRAL DISEASES: ICD-10-CM

## 2021-02-16 ENCOUNTER — APPOINTMENT (OUTPATIENT)
Dept: HEMATOLOGY ONCOLOGY | Facility: CLINIC | Age: 56
End: 2021-02-16
Payer: COMMERCIAL

## 2021-02-16 ENCOUNTER — LABORATORY RESULT (OUTPATIENT)
Age: 56
End: 2021-02-16

## 2021-02-16 ENCOUNTER — APPOINTMENT (OUTPATIENT)
Dept: INFUSION THERAPY | Facility: CLINIC | Age: 56
End: 2021-02-16
Payer: COMMERCIAL

## 2021-02-16 VITALS
BODY MASS INDEX: 26.11 KG/M2 | WEIGHT: 133 LBS | HEIGHT: 60 IN | TEMPERATURE: 96.3 F | DIASTOLIC BLOOD PRESSURE: 58 MMHG | SYSTOLIC BLOOD PRESSURE: 109 MMHG | HEART RATE: 71 BPM

## 2021-02-16 LAB
HCT VFR BLD CALC: 37.4 %
HGB BLD-MCNC: 12.9 G/DL
MCHC RBC-ENTMCNC: 29.8 PG
MCHC RBC-ENTMCNC: 34.5 G/DL
MCV RBC AUTO: 86.4 FL
PLATELET # BLD AUTO: 206 K/UL
PMV BLD: 11.7 FL
RBC # BLD: 4.33 M/UL
RBC # FLD: 14.3 %
WBC # FLD AUTO: 9.15 K/UL

## 2021-02-16 PROCEDURE — 99215 OFFICE O/P EST HI 40 MIN: CPT

## 2021-02-18 NOTE — REVIEW OF SYSTEMS
[Fatigue] : fatigue [Vaginal Discharge] : vaginal discharge [Negative] : Heme/Lymph [de-identified] : numbness in fingers

## 2021-02-18 NOTE — HISTORY OF PRESENT ILLNESS
[de-identified] : 54 yof, PMH of DM II, presented to ED in 10/2019 after experiencing worsening of chronic, intermittent pelvic pain and vaginal bleeding.  Patient underwent transvaginal ultrasound, which showed 3.5 x 2.7 x 4.4 cm cervical mass.  CT abdomen/pelvis revealed R iliac chain Ln 1.5 x 1.4 cm, along with 2.2 x 1.8 cm portacaval LNs, and enlargement of uterus.  Endometrial biopsy revealed well to moderately differentiated endometrioid cancer with focal squamous differentiation and focal clear cell changes.  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   CT chest negative for evidence of intrathoracic metastases.  Patient was initially scheduled for ALESHA/BSO on 11/25/2019.  However, MRI pelvis revealed L adnexal peritoneal tumor implants, enlarged pelvic and retroperitoneal lymph nodes, FIGO stage IVB.  ALESHA/BSO was subsequently canceled.  Patient recently saw Dr. Perez, who recommended chemotherapy alone.  Patient is currently only having occasional, mild vaginal bleeding and intermittent pelvic pain, which responds to tylenol.\par \par Family history is significant for history of uterine cancer in paternal grandmother (per patient, diagnosed at around 55 years) and "blood cancer" in maternal grandmother.\par \par Patient lives with her family and is fully functional at baseline.  She used to work as a  but is now no longer working.  She is also is in the process of applying for Medicaid. [de-identified] : 1/20/20 : The patient is here for follow up .She received 2 doses of ferraheme prior to chemo . She tolerated carbo, taxol with mild symptoms of nausea and vomiting. She lost her hair , and reported mild numbness of fingers. She reported also minimal vaginal bleed. Her abdominal pain resolved and she is able to ambulate with no problems\par \par 2/10/2020: The patient is here for follow up , she tolerated her second cycle of chemotherapy with no problems , except for mild nausea and vomiting for the first few days. She also reported some mucousy discharge from vagina. She is eating well , no abdominal pain. \par \par 3/2/2020: The patient is here for follow up . She is tolerating chemotherapy with no major side effects except for nausea in the following days.  She had a CT C/A/P after her 3rd cycle which showed mixed response. She denies any vaginal bleed, no rectal pain on defecation, no abdominal pain. \par \par \par \par 3/23/2020: The patient is here for follow up. She is tolerating chemotherapy with no major side effects . She is reporting nausea in the following days after chemotherapy. She denies fever, chills, abdominal pain , no constipation or diarrhea . No vaginal bleeding\par \par 04/07/2020\par JAY BOWMAN a 54 year F is here today for follow up of FIGO IVB endometrial cancer.\par Had  Ronnie number [id number].\par S/p 4 cycles of carbo and taxol, tolerating well. Cancelled last chemo appt due to corona virus. \par Reports lower abdominal cramping 2/10 started today. Denies vaginal bleeding today. Did have spotting x 1 since last treatment.\par Denies NVD, fever, chills. \par Pt reports peripheral neuropathy in fingers b/l not bothersome \par Had GYN f/up 3 weeks ago. \par CBC reviewed: WBC 8.6, h/H 12.8/36.9%, , Neutrophils 5.59\par \par 4/28/20: \par History obtained through  [facility]ish from [facility]. ID# [id number]\par Pt returns for a f/u vist. She is S/P 1st cycle of keytruda. She tolerated it fairly well. Abdominal pain has resolved. She continues to have mild yellowish vaginal discharge. No vaginal bleeding.\par \par 05/19/2020\par JAY BOWMAN a 54 year F is here today with son for follow up of FIGO IVB endometrial cancer. \par Pt prefers her son translates today's visit. \par Pt denies abdominal pain, vaginal bleeding, NVD, fever, chills. \par She is s/p 2nd cycle of Keytruda, tolerated well. \par  : 31\par \par 6/9/20\par Pt is here for follow up.\par No new complaints.\par Feels well. Denies any abd pain, nausea, vomiting, rash or fever.\par Has mild vag spotting\par \par 06/30/2020\par JAY BOWMAN a 54 year F is here today for follow up of FIGO IVB endometrial cancer. \par S/p 4 cycles of Keytruda.\par She feels well. Denies any new complaints.\par Denies fever, chills, SOB, vaginal bleeding, abdominal pain, distention. \par Last : 29\par CBC reviewed: H/H 13.5/39.2%.\par \par 7/21/2020\par 55 yo female accompanied by her son for follow up visit for endometrial cancer and evaluation prior to Keytruda #6.  She offers no new complaints.  \par Denies any fever, chills, SOB, vaginal bleeding, abdominal pain or distention.  Last : 35/39/68.\par CBC shows wbc=9.11, Hgb=13.8, wsj=822, ANC=4.89.  H=1.39 on 6/30/2020. She was COVID tested 7/17/2020 not detected.\par \par 8/11/20\par Pt is here for follow up.\par No complaints of abd pain, N, v, D or vaginal bleeding.\par Has mild tingling of feet and finger tips.\par No fever.\par \par 9/1/20\par Pt is here for follow up.\par Tolerating Keytruda well.\par Patient denies abdominal pain or bloating, denies vaginal bleeding or discharge.\par Patient denies shortness of breath, denies fever, denies bone pain.\par Has occasional vaginal discomfort.\par \par 9/22/20\par Pt is here  for follow up.\par No new complaints, Denies abdominal pain, N, V, d\par No vag bleeding\par No SOB.\par \par 10/13/20\par Pt is here for follow up.\par No vag bleeding, N, V, D, abd pain.\par No side effects from immunotherapy\par \par 11/3/20\par Pt is here for follow up.\par No new complaints.\par No N, V, D, Abd pain.\par No vag bleeding.\par Tolerating immunotherapy well.\par \par 11/24/2020\par Patient is here today for follow up visit for endometrial cancer.  She is tolerating immunotherapy-  Keytruda well.  She offers no new complaints except for mild paresthesias.  Denies any N/V/D/C or vaginal bleeding.  Last =28.\par \par 12/15/20\par Pt is here for follow up visit.\par No complaints.\par Feels well, no abd pain, n. V, diarrhea.\par No vag bleeding.\par has not made appt for Ct scans yet\par \par 1/5/2021\par Pt is her to f/u for endometrial cancer\par No complaints of abdominal pain, or vaginal bleeding\par Feels well with good appetite\par Pt requesting  flu vaccine\par \par 1/26/21\par Pt is here for follow up. Feels well.\par Saw GYN and was offered interval debulking scheduled for 2/15\par No vag bleeding\par \par 2/16/21\par Pt is here for follow up.\par Her surgery has been rescheduled to 3/1/21\par Feels well, no abd pain, N, V, D, vag bleeding\par States her sugar levels have been running high. She will be seeing her PCP [de-identified] : Microsatellite status MSI-High §\par Tumor Mutational [place] 42 Muts/Mb §\par ARID1A P427fs*6\par ARID1A S0591rf*15\par ATRX I127fs*7\par CASP8 splice site 305+1G>A\par CCND1 P287A\par CDC73 E325*\par CREBBP A146fs*6\par CTCF T204fs*26\par FGFR3 V630M\par FLCN H429fs*39\par JAK1 K860fs*16\par KRAS G12D\par MET Z423btt\par MSH3 K383fs*32\par NFE2L2 E82D\par PDGFRB R919W\par PIK3CA R38C\par PIK3R1 N029crq\par PTEN V166fs*14\par RNF43 G659fs*41\par TP53 D7H

## 2021-02-18 NOTE — ASSESSMENT
[FreeTextEntry1] : 55 yof, PMH of DM II,  diagnosed with FIGO IVB well to moderately differentiated endometrioid cancer with focal squamous differentiation and focal clear cell changes.  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 FIGO IVB endometrial cancer\par - s/p 5 cycles of  carboplatin (AUC 6) and taxol (175 mg/m2) every 3 weeks. \par \par - Was switched from carboplatin/taxol to Keytruda.  NGS testing sent and reviewed.\par \par  Pt going for surgery on 3/1/21. Her bld sugar has been running high which may be due to Keyruda. Will hold it this cycle so that she can get her bld sugar under control prior to surgery.\par \par -  Although pathologic findings are not suggestive of Koch Syndrome, family history is significant for history of uterine cancer in paternal grandmother (per patient, diagnosed at around 55 years), we offered  genetic testing - \par  used to explain it to the patient, but patient refused to be tested \par \par 2.  Low serum vitamin D level-started vitamin D2 50,000 units weekly x 8 weeks. completed, continue Vitamin d 2000 units daily\par \par \par \par \par Follow up after surgery\par \par RTC in 3 weeks\par \par \par

## 2021-02-18 NOTE — ADDENDUM
[FreeTextEntry1] : 2/17/21\par Pt's son called stating that her surgery has been postponed till 3/29/21\par \par Will proceed with cycle # 16 of Keytruda IV: 200 mg once every 3 weeks until disease progression or unacceptable toxicity. \par - Labs ordered CBC, CMP, \par Side effects discussed:\par Peripheral edema, cardiac arrhythmia, Fatigue, Pruritus, skin rash, Hyperglycemia, hypoalbuminemia, hyperypertriglyceridemia , hyponatremia , hypophosphatemia, hypocalcemia, decreased serum bicarbonate , hypercholesterolemia hypokalemia, hypoglycemia, hypercalcemia, hypothyroidism , hyperkalemia, Diarrhea , decreased appetite, constipation , abdominal pain , nausea, vomiting hematuria, anemia, lymphocytopenia, leukopenia, neutropenia, thrombocytopenia, hemorrhage, increased INR, prolonged partial thromboplastin time \par abnormal liver enzymes\par \par MONITORING as follows\par  liver enzymes at baseline and periodically during treatment; renal function; thyroid function (at baseline, periodically during treatment and as clinically indicated); glucose; CBC with differential;\par signs/symptoms of colitis, dermatologic toxicity, hypophysitis, thyroid disorders, pneumonitis, infusion reactions\par \par The chemotherapy dose was adjusted according to pt's height, weight, labs and anticipated tolerance.\par The high risk of complications and complexity associated with antineoplastic therapy administration has been explained to the pt and family. Chemotherapy will be given with adequate precautions including premedications, hydration and close monitoring during treatment.\par Chemotherapy will be administered under my direct supervision\par \par labs ordered CMP, TSH, Ca125,

## 2021-02-18 NOTE — REASON FOR VISIT
[Follow-Up Visit] : a follow-up [[employer] ] : provided by [employer]   [FreeTextEntry1] : [id number] [FreeTextEntry2] : Manish [TWNoteComboBox1] : [place]

## 2021-02-22 ENCOUNTER — APPOINTMENT (OUTPATIENT)
Dept: INFUSION THERAPY | Facility: CLINIC | Age: 56
End: 2021-02-22

## 2021-02-22 ENCOUNTER — LABORATORY RESULT (OUTPATIENT)
Age: 56
End: 2021-02-22

## 2021-02-22 LAB
HCT VFR BLD CALC: 38.4 %
HGB BLD-MCNC: 13.2 G/DL
MCHC RBC-ENTMCNC: 29.7 PG
MCHC RBC-ENTMCNC: 34.4 G/DL
MCV RBC AUTO: 86.3 FL
PLATELET # BLD AUTO: 173 K/UL
PMV BLD: 12.3 FL
RBC # BLD: 4.45 M/UL
RBC # FLD: 14 %
WBC # FLD AUTO: 9.46 K/UL

## 2021-02-23 ENCOUNTER — APPOINTMENT (OUTPATIENT)
Dept: OPHTHALMOLOGY | Facility: CLINIC | Age: 56
End: 2021-02-23

## 2021-02-23 ENCOUNTER — APPOINTMENT (OUTPATIENT)
Dept: INFUSION THERAPY | Facility: CLINIC | Age: 56
End: 2021-02-23

## 2021-02-23 LAB
ALBUMIN SERPL ELPH-MCNC: 4.4 G/DL
ALP BLD-CCNC: 65 U/L
ALT SERPL-CCNC: 21 U/L
ANION GAP SERPL CALC-SCNC: 10 MMOL/L
AST SERPL-CCNC: 18 U/L
BILIRUB SERPL-MCNC: 0.3 MG/DL
BUN SERPL-MCNC: 9 MG/DL
CALCIUM SERPL-MCNC: 10 MG/DL
CHLORIDE SERPL-SCNC: 99 MMOL/L
CO2 SERPL-SCNC: 27 MMOL/L
CREAT SERPL-MCNC: 0.6 MG/DL
GLUCOSE SERPL-MCNC: 183 MG/DL
POTASSIUM SERPL-SCNC: 4.5 MMOL/L
PROT SERPL-MCNC: 7.9 G/DL
SODIUM SERPL-SCNC: 136 MMOL/L

## 2021-02-23 RX ORDER — PEMBROLIZUMAB 25 MG/ML
200 INJECTION, SOLUTION INTRAVENOUS ONCE
Refills: 0 | Status: COMPLETED | OUTPATIENT
Start: 2021-02-23 | End: 2021-02-23

## 2021-02-23 RX ADMIN — PEMBROLIZUMAB 216 MILLIGRAM(S): 25 INJECTION, SOLUTION INTRAVENOUS at 12:37

## 2021-02-23 RX ADMIN — PEMBROLIZUMAB 200 MILLIGRAM(S): 25 INJECTION, SOLUTION INTRAVENOUS at 13:07

## 2021-02-24 LAB
CANCER AG125 SERPL-ACNC: 24 U/ML
TSH SERPL-ACNC: 2.19 UIU/ML

## 2021-02-25 ENCOUNTER — OUTPATIENT (OUTPATIENT)
Dept: OUTPATIENT SERVICES | Facility: HOSPITAL | Age: 56
LOS: 1 days | Discharge: HOME | End: 2021-02-25
Payer: SUBSIDIZED

## 2021-02-25 ENCOUNTER — RESULT REVIEW (OUTPATIENT)
Age: 56
End: 2021-02-25

## 2021-02-25 VITALS
HEART RATE: 79 BPM | SYSTOLIC BLOOD PRESSURE: 112 MMHG | RESPIRATION RATE: 18 BRPM | DIASTOLIC BLOOD PRESSURE: 72 MMHG | OXYGEN SATURATION: 99 % | HEIGHT: 65 IN | TEMPERATURE: 97 F | WEIGHT: 137.57 LBS

## 2021-02-25 DIAGNOSIS — C54.1 MALIGNANT NEOPLASM OF ENDOMETRIUM: ICD-10-CM

## 2021-02-25 DIAGNOSIS — Z01.818 ENCOUNTER FOR OTHER PREPROCEDURAL EXAMINATION: ICD-10-CM

## 2021-02-25 LAB
A1C WITH ESTIMATED AVERAGE GLUCOSE RESULT: 8.8 % — HIGH (ref 4–5.6)
ALBUMIN SERPL ELPH-MCNC: 4.7 G/DL — SIGNIFICANT CHANGE UP (ref 3.5–5.2)
ALP SERPL-CCNC: 68 U/L — SIGNIFICANT CHANGE UP (ref 30–115)
ALT FLD-CCNC: 20 U/L — SIGNIFICANT CHANGE UP (ref 0–41)
ANION GAP SERPL CALC-SCNC: 15 MMOL/L — HIGH (ref 7–14)
APPEARANCE UR: CLEAR — SIGNIFICANT CHANGE UP
APTT BLD: 38.3 SEC — SIGNIFICANT CHANGE UP (ref 27–39.2)
AST SERPL-CCNC: 17 U/L — SIGNIFICANT CHANGE UP (ref 0–41)
BASOPHILS # BLD AUTO: 0.09 K/UL — SIGNIFICANT CHANGE UP (ref 0–0.2)
BASOPHILS NFR BLD AUTO: 0.9 % — SIGNIFICANT CHANGE UP (ref 0–1)
BILIRUB SERPL-MCNC: 0.3 MG/DL — SIGNIFICANT CHANGE UP (ref 0.2–1.2)
BILIRUB UR-MCNC: NEGATIVE — SIGNIFICANT CHANGE UP
BLD GP AB SCN SERPL QL: SIGNIFICANT CHANGE UP
BUN SERPL-MCNC: 12 MG/DL — SIGNIFICANT CHANGE UP (ref 10–20)
CALCIUM SERPL-MCNC: 10.6 MG/DL — HIGH (ref 8.5–10.1)
CHLORIDE SERPL-SCNC: 100 MMOL/L — SIGNIFICANT CHANGE UP (ref 98–110)
CO2 SERPL-SCNC: 26 MMOL/L — SIGNIFICANT CHANGE UP (ref 17–32)
COLOR SPEC: COLORLESS — SIGNIFICANT CHANGE UP
CREAT SERPL-MCNC: 0.6 MG/DL — LOW (ref 0.7–1.5)
DIFF PNL FLD: NEGATIVE — SIGNIFICANT CHANGE UP
EOSINOPHIL # BLD AUTO: 0.29 K/UL — SIGNIFICANT CHANGE UP (ref 0–0.7)
EOSINOPHIL NFR BLD AUTO: 3 % — SIGNIFICANT CHANGE UP (ref 0–8)
ESTIMATED AVERAGE GLUCOSE: 206 MG/DL — HIGH (ref 68–114)
GLUCOSE SERPL-MCNC: 116 MG/DL — HIGH (ref 70–99)
GLUCOSE UR QL: NEGATIVE — SIGNIFICANT CHANGE UP
HCT VFR BLD CALC: 41.5 % — SIGNIFICANT CHANGE UP (ref 37–47)
HGB BLD-MCNC: 14 G/DL — SIGNIFICANT CHANGE UP (ref 12–16)
IMM GRANULOCYTES NFR BLD AUTO: 0.2 % — SIGNIFICANT CHANGE UP (ref 0.1–0.3)
INR BLD: 1 RATIO — SIGNIFICANT CHANGE UP (ref 0.65–1.3)
KETONES UR-MCNC: NEGATIVE — SIGNIFICANT CHANGE UP
LEUKOCYTE ESTERASE UR-ACNC: NEGATIVE — SIGNIFICANT CHANGE UP
LYMPHOCYTES # BLD AUTO: 3.85 K/UL — HIGH (ref 1.2–3.4)
LYMPHOCYTES # BLD AUTO: 39.9 % — SIGNIFICANT CHANGE UP (ref 20.5–51.1)
MCHC RBC-ENTMCNC: 29.5 PG — SIGNIFICANT CHANGE UP (ref 27–31)
MCHC RBC-ENTMCNC: 33.7 G/DL — SIGNIFICANT CHANGE UP (ref 32–37)
MCV RBC AUTO: 87.4 FL — SIGNIFICANT CHANGE UP (ref 81–99)
MONOCYTES # BLD AUTO: 0.55 K/UL — SIGNIFICANT CHANGE UP (ref 0.1–0.6)
MONOCYTES NFR BLD AUTO: 5.7 % — SIGNIFICANT CHANGE UP (ref 1.7–9.3)
NEUTROPHILS # BLD AUTO: 4.84 K/UL — SIGNIFICANT CHANGE UP (ref 1.4–6.5)
NEUTROPHILS NFR BLD AUTO: 50.3 % — SIGNIFICANT CHANGE UP (ref 42.2–75.2)
NITRITE UR-MCNC: NEGATIVE — SIGNIFICANT CHANGE UP
NRBC # BLD: 0 /100 WBCS — SIGNIFICANT CHANGE UP (ref 0–0)
PH UR: 6.5 — SIGNIFICANT CHANGE UP (ref 5–8)
PLATELET # BLD AUTO: 235 K/UL — SIGNIFICANT CHANGE UP (ref 130–400)
POTASSIUM SERPL-MCNC: 4.8 MMOL/L — SIGNIFICANT CHANGE UP (ref 3.5–5)
POTASSIUM SERPL-SCNC: 4.8 MMOL/L — SIGNIFICANT CHANGE UP (ref 3.5–5)
PROT SERPL-MCNC: 8.3 G/DL — HIGH (ref 6–8)
PROT UR-MCNC: NEGATIVE — SIGNIFICANT CHANGE UP
PROTHROM AB SERPL-ACNC: 11.5 SEC — SIGNIFICANT CHANGE UP (ref 9.95–12.87)
RBC # BLD: 4.75 M/UL — SIGNIFICANT CHANGE UP (ref 4.2–5.4)
RBC # FLD: 14.3 % — SIGNIFICANT CHANGE UP (ref 11.5–14.5)
SODIUM SERPL-SCNC: 141 MMOL/L — SIGNIFICANT CHANGE UP (ref 135–146)
SP GR SPEC: 1.01 — SIGNIFICANT CHANGE UP (ref 1.01–1.03)
UROBILINOGEN FLD QL: SIGNIFICANT CHANGE UP
WBC # BLD: 9.64 K/UL — SIGNIFICANT CHANGE UP (ref 4.8–10.8)
WBC # FLD AUTO: 9.64 K/UL — SIGNIFICANT CHANGE UP (ref 4.8–10.8)

## 2021-02-25 PROCEDURE — 71046 X-RAY EXAM CHEST 2 VIEWS: CPT | Mod: 26

## 2021-02-25 PROCEDURE — 93010 ELECTROCARDIOGRAM REPORT: CPT

## 2021-02-25 RX ORDER — METFORMIN HYDROCHLORIDE 850 MG/1
0.5 TABLET ORAL
Qty: 0 | Refills: 0 | DISCHARGE

## 2021-02-25 NOTE — H&P PST ADULT - REASON FOR ADMISSION
56 y/o male presents to [facility] for robotic assisted total laparoscopic hysterectomy, bilateral salpingoophorectomy, staging, possible open with Dr. Campuzano in [facility] under [place] on 3/8/21

## 2021-02-25 NOTE — H&P PST ADULT - NSANTHOSAYNRD_GEN_A_CORE
see yefri tool/No. YEFRI screening performed.  STOP BANG Legend: 0-2 = LOW Risk; 3-4 = INTERMEDIATE Risk; 5-8 = HIGH Risk

## 2021-02-25 NOTE — H&P PST ADULT - HISTORY OF PRESENT ILLNESS
54 y/o male presents to PAST for robotic assisted total laparoscopic hysterectomy, bilateral salpingoophorectomy, staging, possible open with Dr. Campuzano in [facility] under [place] on 3/8/21     used: Kranthi ID: [id number]  Pt was diagnosed with endometrial cancer current as of 10/19 treatment of FIGO IVB. Patient recently saw Dr. Perez, who recommended chemotherapy alone. Patient is currently only having occasional, mild vaginal bleeding and intermittent pelvic pain, which responds to tylenol. PT currently on Keytruda IV: 200 mg once every 3 weeks. Pt was originally scheduled for 11/19 for procedure but had to be cancelled as per pt because the tumor was too large. Pt has had chemotherapy for the past year and now is ready for procedure. Pt was scheduled for 3/1/21 but due to elevated blood sugars procedure rescheduled to 3/8/21.    PATIENT CURRENTLY DENIES CHEST PAIN  SHORTNESS OF BREATH  PALPITATIONS,  DYSURIA, OR UPPER RESPIRATORY INFECTION IN PAST 2 WEEKS  EXERCISE  TOLERANCE  1-2 FLIGHT OF STAIRS  WITHOUT SHORTNESS OF BREATH    denies travel outside the [place] in the past 30 days    pt denies any covid s/s, or tested positive in the past  pt advised self quarantine till day of procedure    Anesthesia Alert  NO--Difficult Airway  NO--History of neck surgery or radiation  NO--Limited ROM of neck  NO--History of Malignant hyperthermia  NO--No personal or family history of Pseudocholinesterase deficiency.  NO--Prior Anesthesia Complication  NO--Latex Allergy  NO--Loose teeth, upper dentures  NO--History of Rheumatoid Arthritis  NO--MIRIAM  NO--Other_____

## 2021-03-04 ENCOUNTER — NON-APPOINTMENT (OUTPATIENT)
Age: 56
End: 2021-03-04

## 2021-03-05 ENCOUNTER — OUTPATIENT (OUTPATIENT)
Dept: OUTPATIENT SERVICES | Facility: HOSPITAL | Age: 56
LOS: 1 days | Discharge: HOME | End: 2021-03-05

## 2021-03-05 ENCOUNTER — LABORATORY RESULT (OUTPATIENT)
Age: 56
End: 2021-03-05

## 2021-03-05 DIAGNOSIS — Z11.59 ENCOUNTER FOR SCREENING FOR OTHER VIRAL DISEASES: ICD-10-CM

## 2021-03-08 ENCOUNTER — OUTPATIENT (OUTPATIENT)
Dept: OUTPATIENT SERVICES | Facility: HOSPITAL | Age: 56
LOS: 1 days | Discharge: HOME | End: 2021-03-08
Payer: SUBSIDIZED

## 2021-03-08 ENCOUNTER — RESULT REVIEW (OUTPATIENT)
Age: 56
End: 2021-03-08

## 2021-03-08 ENCOUNTER — TRANSCRIPTION ENCOUNTER (OUTPATIENT)
Age: 56
End: 2021-03-08

## 2021-03-08 VITALS
WEIGHT: 134.04 LBS | OXYGEN SATURATION: 100 % | RESPIRATION RATE: 18 BRPM | HEART RATE: 66 BPM | SYSTOLIC BLOOD PRESSURE: 123 MMHG | HEIGHT: 61 IN | TEMPERATURE: 98 F | DIASTOLIC BLOOD PRESSURE: 65 MMHG

## 2021-03-08 VITALS — HEART RATE: 82 BPM | RESPIRATION RATE: 18 BRPM | DIASTOLIC BLOOD PRESSURE: 55 MMHG | SYSTOLIC BLOOD PRESSURE: 110 MMHG

## 2021-03-08 LAB — GLUCOSE BLDC GLUCOMTR-MCNC: 135 MG/DL — HIGH (ref 70–99)

## 2021-03-08 PROCEDURE — 58573 TLH W/T/O UTERUS OVER 250 G: CPT

## 2021-03-08 PROCEDURE — 38572 LAPAROSCOPY LYMPHADENECTOMY: CPT

## 2021-03-08 PROCEDURE — 88309 TISSUE EXAM BY PATHOLOGIST: CPT | Mod: 26

## 2021-03-08 PROCEDURE — 88305 TISSUE EXAM BY PATHOLOGIST: CPT | Mod: 26

## 2021-03-08 PROCEDURE — 88307 TISSUE EXAM BY PATHOLOGIST: CPT | Mod: 26

## 2021-03-08 PROCEDURE — 49255 REMOVAL OF OMENTUM: CPT

## 2021-03-08 RX ORDER — OXYCODONE AND ACETAMINOPHEN 5; 325 MG/1; MG/1
2 TABLET ORAL ONCE
Refills: 0 | Status: DISCONTINUED | OUTPATIENT
Start: 2021-03-08 | End: 2021-03-08

## 2021-03-08 RX ORDER — SIMETHICONE 80 MG/1
1 TABLET, CHEWABLE ORAL
Qty: 120 | Refills: 0
Start: 2021-03-08 | End: 2021-04-06

## 2021-03-08 RX ORDER — MEPERIDINE HYDROCHLORIDE 50 MG/ML
12.5 INJECTION INTRAMUSCULAR; INTRAVENOUS; SUBCUTANEOUS
Refills: 0 | Status: DISCONTINUED | OUTPATIENT
Start: 2021-03-08 | End: 2021-03-08

## 2021-03-08 RX ORDER — SODIUM CHLORIDE 9 MG/ML
1000 INJECTION, SOLUTION INTRAVENOUS
Refills: 0 | Status: DISCONTINUED | OUTPATIENT
Start: 2021-03-08 | End: 2021-03-08

## 2021-03-08 RX ORDER — DOCUSATE SODIUM 100 MG
1 CAPSULE ORAL
Qty: 60 | Refills: 0
Start: 2021-03-08 | End: 2021-04-06

## 2021-03-08 RX ORDER — HYDROMORPHONE HYDROCHLORIDE 2 MG/ML
0.5 INJECTION INTRAMUSCULAR; INTRAVENOUS; SUBCUTANEOUS
Refills: 0 | Status: DISCONTINUED | OUTPATIENT
Start: 2021-03-08 | End: 2021-03-08

## 2021-03-08 RX ORDER — METFORMIN HYDROCHLORIDE 850 MG/1
1 TABLET ORAL
Qty: 0 | Refills: 0 | DISCHARGE

## 2021-03-08 RX ORDER — ACETAMINOPHEN 500 MG
2 TABLET ORAL
Qty: 120 | Refills: 0
Start: 2021-03-08 | End: 2021-03-22

## 2021-03-08 RX ORDER — ONDANSETRON 8 MG/1
4 TABLET, FILM COATED ORAL ONCE
Refills: 0 | Status: DISCONTINUED | OUTPATIENT
Start: 2021-03-08 | End: 2021-03-08

## 2021-03-08 RX ORDER — CHOLECALCIFEROL (VITAMIN D3) 125 MCG
1 CAPSULE ORAL
Qty: 0 | Refills: 0 | DISCHARGE

## 2021-03-08 RX ORDER — HYDROMORPHONE HYDROCHLORIDE 2 MG/ML
1 INJECTION INTRAMUSCULAR; INTRAVENOUS; SUBCUTANEOUS
Refills: 0 | Status: DISCONTINUED | OUTPATIENT
Start: 2021-03-08 | End: 2021-03-08

## 2021-03-08 RX ORDER — GABAPENTIN 400 MG/1
1 CAPSULE ORAL
Qty: 9 | Refills: 0
Start: 2021-03-08 | End: 2021-03-10

## 2021-03-08 NOTE — ASU DISCHARGE PLAN (ADULT/PEDIATRIC) - ASU DC SPECIAL INSTRUCTIONSFT
Keep incisions clean and dry. No heavy lifting x4-6 weeks. Nothing in the vagina for 6-8 weeks - no sex, tampons, douching, tub baths or pools. May Shower. If you have a fever over 100.4F, severe pain or severe bleeding, please call your doctor or visit the emergency room. Follow up in 2 weeks for post-operative check up.

## 2021-03-08 NOTE — BRIEF OPERATIVE NOTE - OPERATION/FINDINGS
EUA: Uterus 14w size, normal external genitalia, no gross lesions.  Laparoscopy: Uterus, cervix bilateral fallopian tubes and ovaries grossly normal. Anterior abdominal wall nodularity likely cancer responding to treatment. No gross tumor visualized, upper abdomen without gross diease.   Bilateral enlarged bulky pelvic LN 2-3cm in size.

## 2021-03-08 NOTE — BRIEF OPERATIVE NOTE - NSICDXBRIEFPROCEDURE_GEN_ALL_CORE_FT
PROCEDURES:  Peritoneal biopsy 08-Mar-2021 11:54:40  Kemi Segal  Pelvic and para-aortic lymphadenectomy 08-Mar-2021 11:54:30  Kemi Segal  Robot-assisted omentectomy 08-Mar-2021 11:54:18  Kemi Segal  Hysterectomy, total, for uterus 250 grams or less, laparoscopic, with salpingo-oophorectomy 08-Mar-2021 11:53:59 Robotic assisted Kemi Segal

## 2021-03-08 NOTE — CHART NOTE - NSCHARTNOTEFT_GEN_A_CORE
PACU ANESTHESIA ADMISSION NOTE      Procedure: Peritoneal biopsy    Pelvic and para-aortic lymphadenectomy    Robot-assisted omentectomy    Hysterectomy, total, for uterus 250 grams or less, laparoscopic, with salpingo-oophorectomy  Robotic assisted      Post op diagnosis:  Endometrial cancer        ____  Intubated  TV:______       Rate: ______      FiO2: ______    _x___  Patent Airway    _x___  Full return of protective reflexes    _x___  Full recovery from anesthesia / back to baseline status    Vitals:  T(F): 99   HR: 73  BP: 115/63  RR: 19  SpO2: 99%    Mental Status:  _x___ Awake   _x____ Alert   _____ Drowsy   _____ Sedated    Nausea/Vomiting:  _x___  NO       ______Yes,   See Post - Op Orders         Pain Scale (0-10):  __0___    Treatment: _x___ None    ____ See Post - Op/PCA Orders    Post - Operative Fluids:   ____ Oral   _x___ See Post - Op Orders    Plan: Discharge:   _x___Home       _____Floor     _____Critical Care    _____  Other:_________________    Comments:  No anesthesia issues or complications noted.  Discharge when criteria met.

## 2021-03-11 LAB — SURGICAL PATHOLOGY STUDY: SIGNIFICANT CHANGE UP

## 2021-03-12 DIAGNOSIS — N88.8 OTHER SPECIFIED NONINFLAMMATORY DISORDERS OF CERVIX UTERI: ICD-10-CM

## 2021-03-12 DIAGNOSIS — D25.0 SUBMUCOUS LEIOMYOMA OF UTERUS: ICD-10-CM

## 2021-03-12 DIAGNOSIS — E11.9 TYPE 2 DIABETES MELLITUS WITHOUT COMPLICATIONS: ICD-10-CM

## 2021-03-12 DIAGNOSIS — N80.0 ENDOMETRIOSIS OF UTERUS: ICD-10-CM

## 2021-03-12 DIAGNOSIS — C54.1 MALIGNANT NEOPLASM OF ENDOMETRIUM: ICD-10-CM

## 2021-03-16 ENCOUNTER — APPOINTMENT (OUTPATIENT)
Dept: HEMATOLOGY ONCOLOGY | Facility: CLINIC | Age: 56
End: 2021-03-16

## 2021-03-16 ENCOUNTER — APPOINTMENT (OUTPATIENT)
Dept: INFUSION THERAPY | Facility: CLINIC | Age: 56
End: 2021-03-16

## 2021-03-19 ENCOUNTER — APPOINTMENT (OUTPATIENT)
Dept: GYNECOLOGIC ONCOLOGY | Facility: CLINIC | Age: 56
End: 2021-03-19
Payer: COMMERCIAL

## 2021-03-19 ENCOUNTER — APPOINTMENT (OUTPATIENT)
Dept: GYNECOLOGIC ONCOLOGY | Facility: CLINIC | Age: 56
End: 2021-03-19

## 2021-03-19 ENCOUNTER — OUTPATIENT (OUTPATIENT)
Dept: OUTPATIENT SERVICES | Facility: HOSPITAL | Age: 56
LOS: 1 days | Discharge: HOME | End: 2021-03-19

## 2021-03-19 VITALS
WEIGHT: 132 LBS | HEART RATE: 70 BPM | RESPIRATION RATE: 20 BRPM | TEMPERATURE: 98.7 F | BODY MASS INDEX: 25.91 KG/M2 | HEIGHT: 60 IN | SYSTOLIC BLOOD PRESSURE: 102 MMHG | DIASTOLIC BLOOD PRESSURE: 59 MMHG

## 2021-03-19 PROCEDURE — 99024 POSTOP FOLLOW-UP VISIT: CPT

## 2021-03-19 NOTE — PHYSICAL EXAM
[Normal] : Masses/Tenderness: Non-tender, no masses [de-identified] : laparoscopic incisions c/d/i

## 2021-03-19 NOTE — HISTORY OF PRESENT ILLNESS
[FreeTextEntry1] : 54 y/o P2 here for postop visit. \par \par Diagnosis: endometrioid carcinoma clinically stage IVB\par Surgery: RATLH/BSO, omentectomy, pelvic and para-aortic LAD 3/8/2021\par Treatment: neoadjuvant carbo/taxol x4 cycles, keytruda\par \par Denies acute complaints today. Denies fever, chills, vomiting, vaginal bleeding, weight loss/loss of appetite, urinary incontinence, change in bowel habits, hematuria. \par \par Pathology:\par Final Diagnosis\par 1. Uterus, cervix, bilateral fallopian tubes and ovaries, robotic assisted total laparoscopic hysterectomy, bilateral salpingo-oophorectomy staging omentectomy:\par - Uterus with a 5 mm polypoid endometrial poorly differentiated carcinoma (solid growth pattern).\par - Adenomyosis.\par - Intramural and submucosal leiomyoma with focal hyalinization.\par - Basalis-type/inactive endometrium.\par - Cervix with Nabothian cysts and tunnel clusters.\par - Dilated left fallopian tube with adhesions and acute and chronic salpingitis.\par - Right fallopian tube and bilateral ovaries showing no significant pathologic change.\par \par 2. Anterior abdominal wall peritoneum:\par - 3 mm hyalinized nodule with histiocytic reaction and cholesterol cleft deposition.\par - Fibrofatty tissue with foci of mild non-specific chronic inflammation, mesothelial hyperplasia and congestion.\par - No carcinoma seen.\par \par 3. Omentum:\par - Fibrofatty tissue showing foci of congestion; no carcinoma\par seen.\par \par 4. Right pelvic lymph node:\par - Fibrofatty tissue and two lymph nodes; negative for metastatic\par carcinoma (0/2).\par \par 5. Left pelvic lymph node:\par - Three out of five lymph nodes show metastatic carcinoma (3/5).\par \par 6. Pre-aortic lymph node:\par - Fibrofatty tissue and one small lymph node; negative for\par metastatic carcinoma (0/1).\par \par 7. Left para-aortic lymph node:\par - Fibrofatty tissue; negative for carcinoma.\par - No lymph node identified.\par

## 2021-03-19 NOTE — DISCUSSION/SUMMARY
[FreeTextEntry1] : 56 y/o P2 with clincial stage IV B (surgically stage IIIC1) endometrial carcinoma, s/p neoadjuvant chemo, s/p RATLH/BSO 3/8/21, here for postop follow up.\par - pathology discussed with patient\par - f/u with heme/onc, to continue with Keytruda for up to 2 years\par - signs/symptoms of recurrence discussed \par - RTC in 6 months

## 2021-03-23 ENCOUNTER — LABORATORY RESULT (OUTPATIENT)
Age: 56
End: 2021-03-23

## 2021-03-23 ENCOUNTER — APPOINTMENT (OUTPATIENT)
Dept: HEMATOLOGY ONCOLOGY | Facility: CLINIC | Age: 56
End: 2021-03-23
Payer: SELF-PAY

## 2021-03-23 ENCOUNTER — APPOINTMENT (OUTPATIENT)
Dept: INFUSION THERAPY | Facility: CLINIC | Age: 56
End: 2021-03-23
Payer: SELF-PAY

## 2021-03-23 VITALS
HEART RATE: 62 BPM | DIASTOLIC BLOOD PRESSURE: 57 MMHG | SYSTOLIC BLOOD PRESSURE: 109 MMHG | TEMPERATURE: 97.6 F | WEIGHT: 132 LBS | HEIGHT: 60 IN | BODY MASS INDEX: 25.91 KG/M2

## 2021-03-23 LAB
HCT VFR BLD CALC: 36.9 %
HGB BLD-MCNC: 12.4 G/DL
MCHC RBC-ENTMCNC: 29.2 PG
MCHC RBC-ENTMCNC: 33.6 G/DL
MCV RBC AUTO: 87 FL
PLATELET # BLD AUTO: 188 K/UL
PMV BLD: 11.2 FL
RBC # BLD: 4.24 M/UL
RBC # FLD: 13.3 %
WBC # FLD AUTO: 11.34 K/UL

## 2021-03-23 PROCEDURE — 99215 OFFICE O/P EST HI 40 MIN: CPT

## 2021-03-23 RX ORDER — PEMBROLIZUMAB 25 MG/ML
200 INJECTION, SOLUTION INTRAVENOUS ONCE
Refills: 0 | Status: COMPLETED | OUTPATIENT
Start: 2021-03-23 | End: 2021-03-23

## 2021-03-23 RX ADMIN — PEMBROLIZUMAB 200 MILLIGRAM(S): 25 INJECTION, SOLUTION INTRAVENOUS at 15:49

## 2021-03-23 RX ADMIN — PEMBROLIZUMAB 216 MILLIGRAM(S): 25 INJECTION, SOLUTION INTRAVENOUS at 15:19

## 2021-03-23 NOTE — ASSESSMENT
[FreeTextEntry1] : 55 yof, PMH of DM II,  diagnosed with FIGO IVB well to moderately differentiated endometrioid cancer with focal squamous differentiation and focal clear cell changes.  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 FIGO IVB endometrial cancer\par - s/p 5 cycles of  carboplatin (AUC 6) and taxol (175 mg/m2) every 3 weeks. \par \par - Was switched from carboplatin/taxol to Keytruda.  NGS testing sent and reviewed.\par \par  Pt had surgery on 3/8/21. Pathology report d/w pt and her family. Seems like she had a good response to neoadjuvant treatment.\par reviewed the operative report, there was no gross residual disease left post surgery.\par 3/5 left pelvic Lns were positive for Ca, Pathologic stage stage pt1N1a Stage IIIC1\par \par Plan\par Will continue with Keytruda q 3 weeks.Proceed with cycle # 16 of Keytruda IV: 200 mg once every 3 weeks until disease progression or unacceptable toxicity. \par - Labs ordered CBC, CMP, \par Side effects discussed:\par Peripheral edema, cardiac arrhythmia, Fatigue, Pruritus, skin rash, Hyperglycemia, hypoalbuminemia, hyperypertriglyceridemia , hyponatremia , hypophosphatemia, hypocalcemia, decreased serum bicarbonate , hypercholesterolemia hypokalemia, hypoglycemia, hypercalcemia, hypothyroidism , hyperkalemia, Diarrhea , decreased appetite, constipation , abdominal pain , nausea, vomiting hematuria, anemia, lymphocytopenia, leukopenia, neutropenia, thrombocytopenia, hemorrhage, increased INR, prolonged partial thromboplastin time \par abnormal liver enzymes\par \par MONITORING as follows\par  liver enzymes at baseline and periodically during treatment; renal function; thyroid function (at baseline, periodically during treatment and as clinically indicated); glucose; CBC with differential;\par signs/symptoms of colitis, dermatologic toxicity, hypophysitis, thyroid disorders, pneumonitis, infusion reactions\par \par The immunotherapy dose was adjusted according to pt's height, weight, labs and anticipated tolerance.\par The high risk of complications and complexity associated with antineoplastic therapy administration has been explained to the pt and family.\par Treatment will be administered under my direct supervision\par labs ordered CMP, TSH, Ca125, \par \par \par \par -  Although pathologic findings are not suggestive of Koch Syndrome, family history is significant for history of uterine cancer in paternal grandmother (per patient, diagnosed at around 55 years), we offered  genetic testing - \par  used to explain it to the patient, but patient refused to be tested \par \par 2.  Low serum vitamin D level-started vitamin D2 50,000 units weekly x 8 weeks. completed, continue Vitamin d 2000 units daily\par \par \par Although pt was stage IV at diagnosis, with neoadjuvant chemo she got downstaged.\par Will d/w Rad/Onc if there is any benefit for RT\par \par \par Plan d/w pt and her son who per pt preference was translating\par RTC in 3 weeks\par \par \par

## 2021-03-23 NOTE — REVIEW OF SYSTEMS
[Fatigue] : fatigue [Vaginal Discharge] : vaginal discharge [Negative] : Heme/Lymph [de-identified] : numbness in fingers

## 2021-03-23 NOTE — HISTORY OF PRESENT ILLNESS
[de-identified] : 54 yof, PMH of DM II, presented to ED in 10/2019 after experiencing worsening of chronic, intermittent pelvic pain and vaginal bleeding.  Patient underwent transvaginal ultrasound, which showed 3.5 x 2.7 x 4.4 cm cervical mass.  CT abdomen/pelvis revealed R iliac chain Ln 1.5 x 1.4 cm, along with 2.2 x 1.8 cm portacaval LNs, and enlargement of uterus.  Endometrial biopsy revealed well to moderately differentiated endometrioid cancer with focal squamous differentiation and focal clear cell changes.  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   CT chest negative for evidence of intrathoracic metastases.  Patient was initially scheduled for ALESHA/BSO on 11/25/2019.  However, MRI pelvis revealed L adnexal peritoneal tumor implants, enlarged pelvic and retroperitoneal lymph nodes, FIGO stage IVB.  ALESHA/BSO was subsequently canceled.  Patient recently saw Dr. Perez, who recommended chemotherapy alone.  Patient is currently only having occasional, mild vaginal bleeding and intermittent pelvic pain, which responds to tylenol.\par \par Family history is significant for history of uterine cancer in paternal grandmother (per patient, diagnosed at around 55 years) and "blood cancer" in maternal grandmother.\par \par Patient lives with her family and is fully functional at baseline.  She used to work as a  but is now no longer working.  She is also is in the process of applying for Medicaid. [de-identified] : Microsatellite status MSI-High §\par Tumor Mutational [place] 42 Muts/Mb §\par ARID1A P427fs*6\par ARID1A G4465xq*15\par ATRX I127fs*7\par CASP8 splice site 305+1G>A\par CCND1 P287A\par CDC73 E325*\par CREBBP A146fs*6\par CTCF T204fs*26\par FGFR3 V630M\par FLCN H429fs*39\par JAK1 K860fs*16\par KRAS G12D\par MET L064wlm\par MSH3 K383fs*32\par NFE2L2 E82D\par PDGFRB R919W\par PIK3CA R38C\par PIK3R1 H620wyf\par PTEN V166fs*14\par RNF43 G659fs*41\par TP53 D7H  [de-identified] : 1/20/20 : The patient is here for follow up .She received 2 doses of ferraheme prior to chemo . She tolerated carbo, taxol with mild symptoms of nausea and vomiting. She lost her hair , and reported mild numbness of fingers. She reported also minimal vaginal bleed. Her abdominal pain resolved and she is able to ambulate with no problems\par \par 2/10/2020: The patient is here for follow up , she tolerated her second cycle of chemotherapy with no problems , except for mild nausea and vomiting for the first few days. She also reported some mucousy discharge from vagina. She is eating well , no abdominal pain. \par \par 3/2/2020: The patient is here for follow up . She is tolerating chemotherapy with no major side effects except for nausea in the following days.  She had a CT C/A/P after her 3rd cycle which showed mixed response. She denies any vaginal bleed, no rectal pain on defecation, no abdominal pain. \par \par \par \par 3/23/2020: The patient is here for follow up. She is tolerating chemotherapy with no major side effects . She is reporting nausea in the following days after chemotherapy. She denies fever, chills, abdominal pain , no constipation or diarrhea . No vaginal bleeding\par \par 04/07/2020\par JAY BOWMAN a 54 year F is here today for follow up of FIGO IVB endometrial cancer.\par Had  Ronnie number [id number].\par S/p 4 cycles of carbo and taxol, tolerating well. Cancelled last chemo appt due to corona virus. \par Reports lower abdominal cramping 2/10 started today. Denies vaginal bleeding today. Did have spotting x 1 since last treatment.\par Denies NVD, fever, chills. \par Pt reports peripheral neuropathy in fingers b/l not bothersome \par Had GYN f/up 3 weeks ago. \par CBC reviewed: WBC 8.6, h/H 12.8/36.9%, , Neutrophils 5.59\par \par 4/28/20: \par History obtained through  [facility] from [employer]. ID# [id number]\par Pt returns for a f/u vist. She is S/P 1st cycle of keytruda. She tolerated it fairly well. Abdominal pain has resolved. She continues to have mild yellowish vaginal discharge. No vaginal bleeding.\par \par 05/19/2020\par JAY BOWMAN a 54 year F is here today with son for follow up of FIGO IVB endometrial cancer. \par Pt prefers her son translates today's visit. \par Pt denies abdominal pain, vaginal bleeding, NVD, fever, chills. \par She is s/p 2nd cycle of Keytruda, tolerated well. \par  : 31\par \par 6/9/20\par Pt is here for follow up.\par No new complaints.\par Feels well. Denies any abd pain, nausea, vomiting, rash or fever.\par Has mild vag spotting\par \par 06/30/2020\par JAY BOWMAN a 54 year F is here today for follow up of FIGO IVB endometrial cancer. \par S/p 4 cycles of Keytruda.\par She feels well. Denies any new complaints.\par Denies fever, chills, SOB, vaginal bleeding, abdominal pain, distention. \par Last : 29\par CBC reviewed: H/H 13.5/39.2%.\par \par 7/21/2020\par 53 yo female accompanied by her son for follow up visit for endometrial cancer and evaluation prior to Keytruda #6.  She offers no new complaints.  \par Denies any fever, chills, SOB, vaginal bleeding, abdominal pain or distention.  Last : 35/39/68.\par CBC shows wbc=9.11, Hgb=13.8, jze=991, ANC=4.89.  H=1.39 on 6/30/2020. She was COVID tested 7/17/2020 not detected.\par \par 8/11/20\par Pt is here for follow up.\par No complaints of abd pain, N, v, D or vaginal bleeding.\par Has mild tingling of feet and finger tips.\par No fever.\par \par 9/1/20\par Pt is here for follow up.\par Tolerating Keytruda well.\par Patient denies abdominal pain or bloating, denies vaginal bleeding or discharge.\par Patient denies shortness of breath, denies fever, denies bone pain.\par Has occasional vaginal discomfort.\par \par 9/22/20\par Pt is here  for follow up.\par No new complaints, Denies abdominal pain, N, V, d\par No vag bleeding\par No SOB.\par \par 10/13/20\par Pt is here for follow up.\par No vag bleeding, N, V, D, abd pain.\par No side effects from immunotherapy\par \par 11/3/20\par Pt is here for follow up.\par No new complaints.\par No N, V, D, Abd pain.\par No vag bleeding.\par Tolerating immunotherapy well.\par \par 11/24/2020\par Patient is here today for follow up visit for endometrial cancer.  She is tolerating immunotherapy-  Keytruda well.  She offers no new complaints except for mild paresthesias.  Denies any N/V/D/C or vaginal bleeding.  Last =28.\par \par 12/15/20\par Pt is here for follow up visit.\par No complaints.\par Feels well, no abd pain, n. V, diarrhea.\par No vag bleeding.\par has not made appt for Ct scans yet\par \par 1/5/2021\par Pt is her to f/u for endometrial cancer\par No complaints of abdominal pain, or vaginal bleeding\par Feels well with good appetite\par Pt requesting  flu vaccine\par \par 1/26/21\par Pt is here for follow up. Feels well.\par Saw GYN and was offered interval debulking scheduled for 2/15\par No vag bleeding\par \par 2/16/21\par Pt is here for follow up.\par Her surgery has been rescheduled to 3/1/21\par Feels well, no abd pain, N, V, D, vag bleeding\par States her sugar levels have been running high. She will be seeing her PCP\par \par 3/23/21\par Pt is here for follow up. She is s/p surgery 08-Mar-2021 .\par She underwent Peritoneal biopsy \par Pelvic and para-aortic lymphadenectomy\par \par Robot-assisted omentectomy\par Hysterectomy, total,  with salpingo-oophorectomy \par Operative Findings:\par - Operative Findings EUA: Uterus 14w size, normal external genitalia, no gross\par lesions.\par Laparoscopy: Uterus, cervix bilateral fallopian tubes and ovaries grossly\par normal. Anterior abdominal wall nodularity likely cancer responding to\par treatment. No gross tumor visualized, upper abdomen without gross diease.\par Bilateral enlarged bulky pelvic LN 2-3cm in size.\par \par Pathology as noted below\par \par Pt is recovering well from surgery. Has Nl Bowel habits. No post op complications.\par Has been having high bld sugar, will talk to PCP

## 2021-03-23 NOTE — REASON FOR VISIT
[Follow-Up Visit] : a follow-up [[employer] ] : provided by [employer]   [FreeTextEntry1] : [id number] [FreeTextEntry2] : [facility] [TWNoteComboBox1] : [place]

## 2021-03-24 LAB
ALBUMIN SERPL ELPH-MCNC: 4.2 G/DL
ALP BLD-CCNC: 73 U/L
ALT SERPL-CCNC: 12 U/L
ANION GAP SERPL CALC-SCNC: 11 MMOL/L
AST SERPL-CCNC: 16 U/L
BILIRUB SERPL-MCNC: <0.2 MG/DL
BUN SERPL-MCNC: 10 MG/DL
CALCIUM SERPL-MCNC: 10.2 MG/DL
CHLORIDE SERPL-SCNC: 102 MMOL/L
CO2 SERPL-SCNC: 27 MMOL/L
CREAT SERPL-MCNC: 0.6 MG/DL
GLUCOSE SERPL-MCNC: 131 MG/DL
POTASSIUM SERPL-SCNC: 4.7 MMOL/L
PROT SERPL-MCNC: 8.2 G/DL
SODIUM SERPL-SCNC: 140 MMOL/L
T4 SERPL-MCNC: 7.4 UG/DL
TSH SERPL-ACNC: 1.71 UIU/ML

## 2021-04-08 DIAGNOSIS — C54.1 MALIGNANT NEOPLASM OF ENDOMETRIUM: ICD-10-CM

## 2021-04-13 ENCOUNTER — LABORATORY RESULT (OUTPATIENT)
Age: 56
End: 2021-04-13

## 2021-04-13 ENCOUNTER — APPOINTMENT (OUTPATIENT)
Dept: INFUSION THERAPY | Facility: CLINIC | Age: 56
End: 2021-04-13
Payer: COMMERCIAL

## 2021-04-13 ENCOUNTER — APPOINTMENT (OUTPATIENT)
Dept: HEMATOLOGY ONCOLOGY | Facility: CLINIC | Age: 56
End: 2021-04-13
Payer: COMMERCIAL

## 2021-04-13 ENCOUNTER — OUTPATIENT (OUTPATIENT)
Dept: OUTPATIENT SERVICES | Facility: HOSPITAL | Age: 56
LOS: 1 days | Discharge: HOME | End: 2021-04-13

## 2021-04-13 VITALS
TEMPERATURE: 97.7 F | SYSTOLIC BLOOD PRESSURE: 111 MMHG | DIASTOLIC BLOOD PRESSURE: 51 MMHG | BODY MASS INDEX: 26.5 KG/M2 | WEIGHT: 135 LBS | HEART RATE: 64 BPM | HEIGHT: 60 IN

## 2021-04-13 DIAGNOSIS — Z51.12 ENCOUNTER FOR ANTINEOPLASTIC IMMUNOTHERAPY: ICD-10-CM

## 2021-04-13 DIAGNOSIS — C54.1 MALIGNANT NEOPLASM OF ENDOMETRIUM: ICD-10-CM

## 2021-04-13 LAB
HCT VFR BLD CALC: 37.2 %
HGB BLD-MCNC: 12.7 G/DL
MCHC RBC-ENTMCNC: 29.3 PG
MCHC RBC-ENTMCNC: 34.1 G/DL
MCV RBC AUTO: 85.9 FL
PLATELET # BLD AUTO: 205 K/UL
PMV BLD: 11.6 FL
RBC # BLD: 4.33 M/UL
RBC # FLD: 13.7 %
WBC # FLD AUTO: 9.07 K/UL

## 2021-04-13 PROCEDURE — 99215 OFFICE O/P EST HI 40 MIN: CPT

## 2021-04-13 RX ORDER — PEMBROLIZUMAB 25 MG/ML
200 INJECTION, SOLUTION INTRAVENOUS ONCE
Refills: 0 | Status: COMPLETED | OUTPATIENT
Start: 2021-04-13 | End: 2021-04-13

## 2021-04-13 RX ADMIN — PEMBROLIZUMAB 216 MILLIGRAM(S): 25 INJECTION, SOLUTION INTRAVENOUS at 13:35

## 2021-04-13 RX ADMIN — PEMBROLIZUMAB 200 MILLIGRAM(S): 25 INJECTION, SOLUTION INTRAVENOUS at 13:35

## 2021-04-14 LAB
ALBUMIN SERPL ELPH-MCNC: 4.3 G/DL
ALP BLD-CCNC: 69 U/L
ALT SERPL-CCNC: 14 U/L
ANION GAP SERPL CALC-SCNC: 13 MMOL/L
AST SERPL-CCNC: 17 U/L
BILIRUB SERPL-MCNC: <0.2 MG/DL
BUN SERPL-MCNC: 12 MG/DL
CALCIUM SERPL-MCNC: 9.3 MG/DL
CHLORIDE SERPL-SCNC: 99 MMOL/L
CO2 SERPL-SCNC: 23 MMOL/L
CREAT SERPL-MCNC: 0.5 MG/DL
GLUCOSE SERPL-MCNC: 172 MG/DL
POTASSIUM SERPL-SCNC: 4.4 MMOL/L
PROT SERPL-MCNC: 7.7 G/DL
SODIUM SERPL-SCNC: 135 MMOL/L

## 2021-04-15 LAB — TSH SERPL-ACNC: 2.53 UIU/ML

## 2021-04-16 NOTE — REASON FOR VISIT
[Follow-Up Visit] : a follow-up [[employer] ] : provided by [employer]   [FreeTextEntry1] : [id number] [FreeTextEntry2] : [employer] [TWNoteComboBox1] : [place]

## 2021-04-16 NOTE — PHYSICAL EXAM
[Fully active, able to carry on all pre-disease performance without restriction] : Status 0 - Fully active, able to carry on all pre-disease performance without restriction [Normal] : affect appropriate [de-identified] : healing lap scars, no tenderness

## 2021-04-16 NOTE — ASSESSMENT
[FreeTextEntry1] : 55 yof, PMH of DM II,  diagnosed with FIGO IVB well to moderately differentiated endometrioid cancer with focal squamous differentiation and focal clear cell changes.  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 FIGO IVB endometrial cancer\par - s/p 5 cycles of  carboplatin (AUC 6) and taxol (175 mg/m2) every 3 weeks. \par \par - Was switched from carboplatin/taxol to Keytruda.  NGS testing sent and reviewed.\par \par  Pt had surgery on 3/8/21. Pathology report d/w pt and her family. Seems like she had a good response to neoadjuvant treatment.\par reviewed the operative report, there was no gross residual disease left post surgery.\par 3/5 left pelvic Lns were positive for Ca, Pathologic stage stage pt1N1a Stage IIIC1\par \par Plan\par Will continue with Keytruda q 3 weeks.Proceed with cycle # 17 of Keytruda IV: 200 mg once every 3 weeks until disease progression or unacceptable toxicity. \par - Labs ordered CBC, CMP, \par Side effects discussed:\par Peripheral edema, cardiac arrhythmia, Fatigue, Pruritus, skin rash, Hyperglycemia, hypoalbuminemia, hyperypertriglyceridemia , hyponatremia , hypophosphatemia, hypocalcemia, decreased serum bicarbonate , hypercholesterolemia hypokalemia, hypoglycemia, hypercalcemia, hypothyroidism , hyperkalemia, Diarrhea , decreased appetite, constipation , abdominal pain , nausea, vomiting hematuria, anemia, lymphocytopenia, leukopenia, neutropenia, thrombocytopenia, hemorrhage, increased INR, prolonged partial thromboplastin time \par abnormal liver enzymes\par \par MONITORING as follows\par  liver enzymes at baseline and periodically during treatment; renal function; thyroid function (at baseline, periodically during treatment and as clinically indicated); glucose; CBC with differential;\par signs/symptoms of colitis, dermatologic toxicity, hypophysitis, thyroid disorders, pneumonitis, infusion reactions\par \par The immunotherapy dose was adjusted according to pt's height, weight, labs and anticipated tolerance.\par The high risk of complications and complexity associated with antineoplastic therapy administration has been explained to the pt and family.\par Treatment will be administered under my direct supervision\par labs ordered CMP, TSH, Ca125, \par \par \par \par -  Although pathologic findings are not suggestive of Koch Syndrome, family history is significant for history of uterine cancer in paternal grandmother (per patient, diagnosed at around 55 years), we offered  genetic testing - \par  used to explain it to the patient, but patient refused to be tested \par \par 2.  Low serum vitamin D level-started vitamin D2 50,000 units weekly x 8 weeks. completed, continue Vitamin d 2000 units daily\par \par \par Although pt was stage IV at diagnosis, with neoadjuvant chemo she got downstaged.\par had  d/w Rad/Onc if there is any benefit for RT, no role for RT\par \par \par Plan d/w pt and her son who per pt preference was translating\par RTC in 3 weeks\par \par \par

## 2021-04-16 NOTE — HISTORY OF PRESENT ILLNESS
[de-identified] : 54 yof, PMH of DM II, presented to ED in 10/2019 after experiencing worsening of chronic, intermittent pelvic pain and vaginal bleeding.  Patient underwent transvaginal ultrasound, which showed 3.5 x 2.7 x 4.4 cm cervical mass.  CT abdomen/pelvis revealed R iliac chain Ln 1.5 x 1.4 cm, along with 2.2 x 1.8 cm portacaval LNs, and enlargement of uterus.  Endometrial biopsy revealed well to moderately differentiated endometrioid cancer with focal squamous differentiation and focal clear cell changes.  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   CT chest negative for evidence of intrathoracic metastases.  Patient was initially scheduled for ALESHA/BSO on 11/25/2019.  However, MRI pelvis revealed L adnexal peritoneal tumor implants, enlarged pelvic and retroperitoneal lymph nodes, FIGO stage IVB.  ALESHA/BSO was subsequently canceled.  Patient recently saw Dr. Perez, who recommended chemotherapy alone.  Patient is currently only having occasional, mild vaginal bleeding and intermittent pelvic pain, which responds to tylenol.\par \par Family history is significant for history of uterine cancer in paternal grandmother (per patient, diagnosed at around 55 years) and "blood cancer" in maternal grandmother.\par \par Patient lives with her family and is fully functional at baseline.  She used to work as a  but is now no longer working.  She is also is in the process of applying for Medicaid. [de-identified] : Microsatellite status MSI-High §\par Tumor Mutational [place] 42 Muts/Mb §\par ARID1A P427fs*6\par ARID1A N3479xy*15\par ATRX I127fs*7\par CASP8 splice site 305+1G>A\par CCND1 P287A\par CDC73 E325*\par CREBBP A146fs*6\par CTCF T204fs*26\par FGFR3 V630M\par FLCN H429fs*39\par JAK1 K860fs*16\par KRAS G12D\par MET C243fbh\par MSH3 K383fs*32\par NFE2L2 E82D\par PDGFRB R919W\par PIK3CA R38C\par PIK3R1 I531qzm\par PTEN V166fs*14\par RNF43 G659fs*41\par TP53 D7H  [de-identified] : 1/20/20 : The patient is here for follow up .She received 2 doses of ferraheme prior to chemo . She tolerated carbo, taxol with mild symptoms of nausea and vomiting. She lost her hair , and reported mild numbness of fingers. She reported also minimal vaginal bleed. Her abdominal pain resolved and she is able to ambulate with no problems\par \par 2/10/2020: The patient is here for follow up , she tolerated her second cycle of chemotherapy with no problems , except for mild nausea and vomiting for the first few days. She also reported some mucousy discharge from vagina. She is eating well , no abdominal pain. \par \par 3/2/2020: The patient is here for follow up . She is tolerating chemotherapy with no major side effects except for nausea in the following days.  She had a CT C/A/P after her 3rd cycle which showed mixed response. She denies any vaginal bleed, no rectal pain on defecation, no abdominal pain. \par \par \par \par 3/23/2020: The patient is here for follow up. She is tolerating chemotherapy with no major side effects . She is reporting nausea in the following days after chemotherapy. She denies fever, chills, abdominal pain , no constipation or diarrhea . No vaginal bleeding\par \par 04/07/2020\par JAY BOWMAN a 54 year F is here today for follow up of FIGO IVB endometrial cancer.\par Had  Ronnie number [id number].\par S/p 4 cycles of carbo and taxol, tolerating well. Cancelled last chemo appt due to corona virus. \par Reports lower abdominal cramping 2/10 started today. Denies vaginal bleeding today. Did have spotting x 1 since last treatment.\par Denies NVD, fever, chills. \par Pt reports peripheral neuropathy in fingers b/l not bothersome \par Had GYN f/up 3 weeks ago. \par CBC reviewed: WBC 8.6, h/H 12.8/36.9%, , Neutrophils 5.59\par \par 4/28/20: \par History obtained through  [facility] from [employer]. ID# [id number]\par Pt returns for a f/u vist. She is S/P 1st cycle of keytruda. She tolerated it fairly well. Abdominal pain has resolved. She continues to have mild yellowish vaginal discharge. No vaginal bleeding.\par \par 05/19/2020\par JAY BOWMAN a 54 year F is here today with son for follow up of FIGO IVB endometrial cancer. \par Pt prefers her son translates today's visit. \par Pt denies abdominal pain, vaginal bleeding, NVD, fever, chills. \par She is s/p 2nd cycle of Keytruda, tolerated well. \par  : 31\par \par 6/9/20\par Pt is here for follow up.\par No new complaints.\par Feels well. Denies any abd pain, nausea, vomiting, rash or fever.\par Has mild vag spotting\par \par 06/30/2020\par JAY BOWMAN a 54 year F is here today for follow up of FIGO IVB endometrial cancer. \par S/p 4 cycles of Keytruda.\par She feels well. Denies any new complaints.\par Denies fever, chills, SOB, vaginal bleeding, abdominal pain, distention. \par Last : 29\par CBC reviewed: H/H 13.5/39.2%.\par \par 7/21/2020\par 53 yo female accompanied by her son for follow up visit for endometrial cancer and evaluation prior to Keytruda #6.  She offers no new complaints.  \par Denies any fever, chills, SOB, vaginal bleeding, abdominal pain or distention.  Last : 35/39/68.\par CBC shows wbc=9.11, Hgb=13.8, twt=304, ANC=4.89.  H=1.39 on 6/30/2020. She was COVID tested 7/17/2020 not detected.\par \par 8/11/20\par Pt is here for follow up.\par No complaints of abd pain, N, v, D or vaginal bleeding.\par Has mild tingling of feet and finger tips.\par No fever.\par \par 9/1/20\par Pt is here for follow up.\par Tolerating Keytruda well.\par Patient denies abdominal pain or bloating, denies vaginal bleeding or discharge.\par Patient denies shortness of breath, denies fever, denies bone pain.\par Has occasional vaginal discomfort.\par \par 9/22/20\par Pt is here  for follow up.\par No new complaints, Denies abdominal pain, N, V, d\par No vag bleeding\par No SOB.\par \par 10/13/20\par Pt is here for follow up.\par No vag bleeding, N, V, D, abd pain.\par No side effects from immunotherapy\par \par 11/3/20\par Pt is here for follow up.\par No new complaints.\par No N, V, D, Abd pain.\par No vag bleeding.\par Tolerating immunotherapy well.\par \par 11/24/2020\par Patient is here today for follow up visit for endometrial cancer.  She is tolerating immunotherapy-  Keytruda well.  She offers no new complaints except for mild paresthesias.  Denies any N/V/D/C or vaginal bleeding.  Last =28.\par \par 12/15/20\par Pt is here for follow up visit.\par No complaints.\par Feels well, no abd pain, n. V, diarrhea.\par No vag bleeding.\par has not made appt for Ct scans yet\par \par 1/5/2021\par Pt is her to f/u for endometrial cancer\par No complaints of abdominal pain, or vaginal bleeding\par Feels well with good appetite\par Pt requesting  flu vaccine\par \par 1/26/21\par Pt is here for follow up. Feels well.\par Saw GYN and was offered interval debulking scheduled for 2/15\par No vag bleeding\par \par 2/16/21\par Pt is here for follow up.\par Her surgery has been rescheduled to 3/1/21\par Feels well, no abd pain, N, V, D, vag bleeding\par States her sugar levels have been running high. She will be seeing her PCP\par \par 3/23/21\par Pt is here for follow up. She is s/p surgery 08-Mar-2021 .\par She underwent Peritoneal biopsy \par Pelvic and para-aortic lymphadenectomy\par \par Robot-assisted omentectomy\par Hysterectomy, total,  with salpingo-oophorectomy \par Operative Findings:\par - Operative Findings EUA: Uterus 14w size, normal external genitalia, no gross\par lesions.\par Laparoscopy: Uterus, cervix bilateral fallopian tubes and ovaries grossly\par normal. Anterior abdominal wall nodularity likely cancer responding to\par treatment. No gross tumor visualized, upper abdomen without gross diease.\par Bilateral enlarged bulky pelvic LN 2-3cm in size.\par \par Pathology as noted below\par \par Pt is recovering well from surgery. Has Nl Bowel habits. No post op complications.\par Has been having high bld sugar, will talk to PCP\par \par 4/13/21\par pt is here for follow up and treatment. feels well.No abd pain, N, V, d\par No vag bleeding

## 2021-04-16 NOTE — REVIEW OF SYSTEMS
[Fatigue] : fatigue [Vaginal Discharge] : vaginal discharge [Negative] : Heme/Lymph [de-identified] : numbness in fingers

## 2021-05-04 ENCOUNTER — APPOINTMENT (OUTPATIENT)
Dept: HEMATOLOGY ONCOLOGY | Facility: CLINIC | Age: 56
End: 2021-05-04
Payer: COMMERCIAL

## 2021-05-04 ENCOUNTER — APPOINTMENT (OUTPATIENT)
Dept: INFUSION THERAPY | Facility: CLINIC | Age: 56
End: 2021-05-04
Payer: COMMERCIAL

## 2021-05-04 ENCOUNTER — LABORATORY RESULT (OUTPATIENT)
Age: 56
End: 2021-05-04

## 2021-05-04 VITALS
HEART RATE: 67 BPM | DIASTOLIC BLOOD PRESSURE: 65 MMHG | BODY MASS INDEX: 26.11 KG/M2 | HEIGHT: 60 IN | SYSTOLIC BLOOD PRESSURE: 116 MMHG | TEMPERATURE: 98.2 F | WEIGHT: 133 LBS

## 2021-05-04 LAB
HCT VFR BLD CALC: 39.2 %
HGB BLD-MCNC: 13.5 G/DL
MCHC RBC-ENTMCNC: 28.9 PG
MCHC RBC-ENTMCNC: 34.4 G/DL
MCV RBC AUTO: 83.9 FL
PLATELET # BLD AUTO: 180 K/UL
PMV BLD: 12 FL
RBC # BLD: 4.67 M/UL
RBC # FLD: 14.1 %
WBC # FLD AUTO: 8.8 K/UL

## 2021-05-04 PROCEDURE — 99215 OFFICE O/P EST HI 40 MIN: CPT

## 2021-05-04 RX ORDER — PEMBROLIZUMAB 25 MG/ML
200 INJECTION, SOLUTION INTRAVENOUS ONCE
Refills: 0 | Status: DISCONTINUED | OUTPATIENT
Start: 2021-05-04 | End: 2021-05-04

## 2021-05-04 RX ORDER — PEMBROLIZUMAB 25 MG/ML
200 INJECTION, SOLUTION INTRAVENOUS ONCE
Refills: 0 | Status: COMPLETED | OUTPATIENT
Start: 2021-05-04 | End: 2021-05-04

## 2021-05-04 RX ADMIN — PEMBROLIZUMAB 216 MILLIGRAM(S): 25 INJECTION, SOLUTION INTRAVENOUS at 14:35

## 2021-05-04 RX ADMIN — PEMBROLIZUMAB 200 MILLIGRAM(S): 25 INJECTION, SOLUTION INTRAVENOUS at 15:05

## 2021-05-05 LAB
ALBUMIN SERPL ELPH-MCNC: 4.3 G/DL
ALP BLD-CCNC: 77 U/L
ALT SERPL-CCNC: 27 U/L
ANION GAP SERPL CALC-SCNC: 15 MMOL/L
AST SERPL-CCNC: 23 U/L
BILIRUB SERPL-MCNC: 0.3 MG/DL
BUN SERPL-MCNC: 11 MG/DL
CALCIUM SERPL-MCNC: 9.7 MG/DL
CHLORIDE SERPL-SCNC: 99 MMOL/L
CO2 SERPL-SCNC: 24 MMOL/L
CREAT SERPL-MCNC: 0.6 MG/DL
GLUCOSE SERPL-MCNC: 177 MG/DL
POTASSIUM SERPL-SCNC: 4.5 MMOL/L
PROT SERPL-MCNC: 7.8 G/DL
SODIUM SERPL-SCNC: 138 MMOL/L
TSH SERPL-ACNC: 2.09 UIU/ML

## 2021-05-07 NOTE — REVIEW OF SYSTEMS
[Fatigue] : fatigue [Vaginal Discharge] : vaginal discharge [Negative] : Heme/Lymph [de-identified] : numbness in fingers

## 2021-05-07 NOTE — PHYSICAL EXAM
[Fully active, able to carry on all pre-disease performance without restriction] : Status 0 - Fully active, able to carry on all pre-disease performance without restriction [Normal] : affect appropriate [de-identified] : healing lap scars, no tenderness

## 2021-05-07 NOTE — REASON FOR VISIT
[Follow-Up Visit] : a follow-up [[employer] ] : provided by [employer]   [FreeTextEntry2] : FIGO IVB endometrial cancer

## 2021-05-07 NOTE — ASSESSMENT
[FreeTextEntry1] : 55 yof, PMH of DM II,  diagnosed with FIGO IVB well to moderately differentiated endometrioid cancer with focal squamous differentiation and focal clear cell changes.  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 FIGO IVB endometrial cancer\par - s/p 5 cycles of  carboplatin (AUC 6) and taxol (175 mg/m2) every 3 weeks. \par \par - Was switched from carboplatin/taxol to Keytruda.  NGS testing sent and reviewed.\par \par  Pt had surgery on 3/8/21. Pathology report d/w pt and her family. Seems like she had a good response to neoadjuvant treatment.\par reviewed the operative report, there was no gross residual disease left post surgery.\par 3/5 left pelvic Lns were positive for Ca, Pathologic stage stage pT1N1a Stage IIIC1\par \par Plan\par Will continue with Keytruda q 3 weeks.Proceed with cycle # 17 of Keytruda IV: 200 mg once every 3 weeks until disease progression or unacceptable toxicity. \par - Labs ordered CBC, CMP, \par Side effects discussed:\par Peripheral edema, cardiac arrhythmia, Fatigue, Pruritus, skin rash, Hyperglycemia, hypoalbuminemia, hyperypertriglyceridemia , hyponatremia , hypophosphatemia, hypocalcemia, decreased serum bicarbonate , hypercholesterolemia hypokalemia, hypoglycemia, hypercalcemia, hypothyroidism , hyperkalemia, Diarrhea , decreased appetite, constipation , abdominal pain , nausea, vomiting hematuria, anemia, lymphocytopenia, leukopenia, neutropenia, thrombocytopenia, hemorrhage, increased INR, prolonged partial thromboplastin time \par abnormal liver enzymes\par \par MONITORING as follows\par  liver enzymes at baseline and periodically during treatment; renal function; thyroid function (at baseline, periodically during treatment and as clinically indicated); glucose; CBC with differential;\par signs/symptoms of colitis, dermatologic toxicity, hypophysitis, thyroid disorders, pneumonitis, infusion reactions\par \par The immunotherapy dose was adjusted according to pt's height, weight, labs and anticipated tolerance.\par The high risk of complications and complexity associated with antineoplastic therapy administration has been explained to the pt and family.\par Treatment will be administered under my direct supervision\par labs ordered CMP, TSH, Ca125, \par \par \par \par -  Although pathologic findings are not suggestive of Koch Syndrome, family history is significant for history of uterine cancer in paternal grandmother (per patient, diagnosed at around 55 years), we offered  genetic testing - \par  used to explain it to the patient, but patient refused to be tested \par \par 2.  Low serum vitamin D level-started vitamin D2 50,000 units weekly x 8 weeks. completed, continue Vitamin d 2000 units daily\par \par \par Although pt was stage IV at diagnosis, with neoadjuvant chemo she got downstaged.\par had  d/w Rad/Onc if there is any benefit for RT, no role for RT\par \par \par Due to COVID 19, pre-visit patient instructions were explained to the patient and their symptoms were checked upon arrival. \par Masks were used by the health care providers and staff and the examination room was cleaned before and after the patient visit was completed.\par Pt encouraged to complete Covid vaccination\par RTC in 3 weeks\par \par \par

## 2021-05-07 NOTE — HISTORY OF PRESENT ILLNESS
[de-identified] : 54 yof, PMH of DM II, presented to ED in 10/2019 after experiencing worsening of chronic, intermittent pelvic pain and vaginal bleeding.  Patient underwent transvaginal ultrasound, which showed 3.5 x 2.7 x 4.4 cm cervical mass.  CT abdomen/pelvis revealed R iliac chain Ln 1.5 x 1.4 cm, along with 2.2 x 1.8 cm portacaval LNs, and enlargement of uterus.  Endometrial biopsy revealed well to moderately differentiated endometrioid cancer with focal squamous differentiation and focal clear cell changes.  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   CT chest negative for evidence of intrathoracic metastases.  Patient was initially scheduled for ALESHA/BSO on 11/25/2019.  However, MRI pelvis revealed L adnexal peritoneal tumor implants, enlarged pelvic and retroperitoneal lymph nodes, FIGO stage IVB.  ALESHA/BSO was subsequently canceled.  Patient recently saw Dr. Perez, who recommended chemotherapy alone.  Patient is currently only having occasional, mild vaginal bleeding and intermittent pelvic pain, which responds to tylenol.\par \par Family history is significant for history of uterine cancer in paternal grandmother (per patient, diagnosed at around 55 years) and "blood cancer" in maternal grandmother.\par \par Patient lives with her family and is fully functional at baseline.  She used to work as a  but is now no longer working.  She is also is in the process of applying for Medicaid. [de-identified] : Microsatellite status MSI-High §\par Tumor Mutational [place] 42 Muts/Mb §\par ARID1A P427fs*6\par ARID1A F1955am*15\par ATRX I127fs*7\par CASP8 splice site 305+1G>A\par CCND1 P287A\par CDC73 E325*\par CREBBP A146fs*6\par CTCF T204fs*26\par FGFR3 V630M\par FLCN H429fs*39\par JAK1 K860fs*16\par KRAS G12D\par MET O927dvj\par MSH3 K383fs*32\par NFE2L2 E82D\par PDGFRB R919W\par PIK3CA R38C\par PIK3R1 D969vpj\par PTEN V166fs*14\par RNF43 G659fs*41\par TP53 D7H  [de-identified] : 1/20/20 : The patient is here for follow up .She received 2 doses of ferraheme prior to chemo . She tolerated carbo, taxol with mild symptoms of nausea and vomiting. She lost her hair , and reported mild numbness of fingers. She reported also minimal vaginal bleed. Her abdominal pain resolved and she is able to ambulate with no problems\par \par 2/10/2020: The patient is here for follow up , she tolerated her second cycle of chemotherapy with no problems , except for mild nausea and vomiting for the first few days. She also reported some mucousy discharge from vagina. She is eating well , no abdominal pain. \par \par 3/2/2020: The patient is here for follow up . She is tolerating chemotherapy with no major side effects except for nausea in the following days.  She had a CT C/A/P after her 3rd cycle which showed mixed response. She denies any vaginal bleed, no rectal pain on defecation, no abdominal pain. \par \par \par \par 3/23/2020: The patient is here for follow up. She is tolerating chemotherapy with no major side effects . She is reporting nausea in the following days after chemotherapy. She denies fever, chills, abdominal pain , no constipation or diarrhea . No vaginal bleeding\par \par 04/07/2020\par JAY BOWMAN a 54 year F is here today for follow up of FIGO IVB endometrial cancer.\par Had  Ronnie number [id number].\par S/p 4 cycles of carbo and taxol, tolerating well. Cancelled last chemo appt due to corona virus. \par Reports lower abdominal cramping 2/10 started today. Denies vaginal bleeding today. Did have spotting x 1 since last treatment.\par Denies NVD, fever, chills. \par Pt reports peripheral neuropathy in fingers b/l not bothersome \par Had GYN f/up 3 weeks ago. \par CBC reviewed: WBC 8.6, h/H 12.8/36.9%, , Neutrophils 5.59\par \par 4/28/20: \par History obtained through  [facility] from [employer]. ID# [id number]\par Pt returns for a f/u vist. She is S/P 1st cycle of keytruda. She tolerated it fairly well. Abdominal pain has resolved. She continues to have mild yellowish vaginal discharge. No vaginal bleeding.\par \par 05/19/2020\par JAY BOWMAN a 54 year F is here today with son for follow up of FIGO IVB endometrial cancer. \par Pt prefers her son translates today's visit. \par Pt denies abdominal pain, vaginal bleeding, NVD, fever, chills. \par She is s/p 2nd cycle of Keytruda, tolerated well. \par  : 31\par \par 6/9/20\par Pt is here for follow up.\par No new complaints.\par Feels well. Denies any abd pain, nausea, vomiting, rash or fever.\par Has mild vag spotting\par \par 06/30/2020\par JAY BOWMAN a 54 year F is here today for follow up of FIGO IVB endometrial cancer. \par S/p 4 cycles of Keytruda.\par She feels well. Denies any new complaints.\par Denies fever, chills, SOB, vaginal bleeding, abdominal pain, distention. \par Last : 29\par CBC reviewed: H/H 13.5/39.2%.\par \par 7/21/2020\par 55 yo female accompanied by her son for follow up visit for endometrial cancer and evaluation prior to Keytruda #6.  She offers no new complaints.  \par Denies any fever, chills, SOB, vaginal bleeding, abdominal pain or distention.  Last : 35/39/68.\par CBC shows wbc=9.11, Hgb=13.8, knh=792, ANC=4.89.  H=1.39 on 6/30/2020. She was COVID tested 7/17/2020 not detected.\par \par 8/11/20\par Pt is here for follow up.\par No complaints of abd pain, N, v, D or vaginal bleeding.\par Has mild tingling of feet and finger tips.\par No fever.\par \par 9/1/20\par Pt is here for follow up.\par Tolerating Keytruda well.\par Patient denies abdominal pain or bloating, denies vaginal bleeding or discharge.\par Patient denies shortness of breath, denies fever, denies bone pain.\par Has occasional vaginal discomfort.\par \par 9/22/20\par Pt is here  for follow up.\par No new complaints, Denies abdominal pain, N, V, d\par No vag bleeding\par No SOB.\par \par 10/13/20\par Pt is here for follow up.\par No vag bleeding, N, V, D, abd pain.\par No side effects from immunotherapy\par \par 11/3/20\par Pt is here for follow up.\par No new complaints.\par No N, V, D, Abd pain.\par No vag bleeding.\par Tolerating immunotherapy well.\par \par 11/24/2020\par Patient is here today for follow up visit for endometrial cancer.  She is tolerating immunotherapy-  Keytruda well.  She offers no new complaints except for mild paresthesias.  Denies any N/V/D/C or vaginal bleeding.  Last =28.\par \par 12/15/20\par Pt is here for follow up visit.\par No complaints.\par Feels well, no abd pain, n. V, diarrhea.\par No vag bleeding.\par has not made appt for Ct scans yet\par \par 1/5/2021\par Pt is her to f/u for endometrial cancer\par No complaints of abdominal pain, or vaginal bleeding\par Feels well with good appetite\par Pt requesting  flu vaccine\par \par 1/26/21\par Pt is here for follow up. Feels well.\par Saw GYN and was offered interval debulking scheduled for 2/15\par No vag bleeding\par \par 2/16/21\par Pt is here for follow up.\par Her surgery has been rescheduled to 3/1/21\par Feels well, no abd pain, N, V, D, vag bleeding\par States her sugar levels have been running high. She will be seeing her PCP\par \par 3/23/21\par Pt is here for follow up. She is s/p surgery 08-Mar-2021 .\par She underwent Peritoneal biopsy \par Pelvic and para-aortic lymphadenectomy\par \par Robot-assisted omentectomy\par Hysterectomy, total,  with salpingo-oophorectomy \par Operative Findings:\par - Operative Findings EUA: Uterus 14w size, normal external genitalia, no gross\par lesions.\par Laparoscopy: Uterus, cervix bilateral fallopian tubes and ovaries grossly\par normal. Anterior abdominal wall nodularity likely cancer responding to\par treatment. No gross tumor visualized, upper abdomen without gross diease.\par Bilateral enlarged bulky pelvic LN 2-3cm in size.\par \par Pathology as noted below\par \par Pt is recovering well from surgery. Has Nl Bowel habits. No post op complications.\par Has been having high bld sugar, will talk to PCP\par \par 4/13/21\par pt is here for follow up and treatment. feels well.No abd pain, N, V, d\par No vag bleeding\par \par 5/7/21\par Pt is here for a follow up.\par Feels well. No new complaints.\par No abd pain, rash, vag bleeding\par No N, V, D, cough.

## 2021-05-25 ENCOUNTER — APPOINTMENT (OUTPATIENT)
Dept: HEMATOLOGY ONCOLOGY | Facility: CLINIC | Age: 56
End: 2021-05-25
Payer: COMMERCIAL

## 2021-05-25 ENCOUNTER — APPOINTMENT (OUTPATIENT)
Dept: INFUSION THERAPY | Facility: CLINIC | Age: 56
End: 2021-05-25
Payer: COMMERCIAL

## 2021-05-25 ENCOUNTER — LABORATORY RESULT (OUTPATIENT)
Age: 56
End: 2021-05-25

## 2021-05-25 VITALS
HEART RATE: 70 BPM | TEMPERATURE: 98 F | HEIGHT: 60 IN | BODY MASS INDEX: 26.11 KG/M2 | WEIGHT: 133 LBS | DIASTOLIC BLOOD PRESSURE: 72 MMHG | SYSTOLIC BLOOD PRESSURE: 124 MMHG

## 2021-05-25 PROCEDURE — 99214 OFFICE O/P EST MOD 30 MIN: CPT

## 2021-05-25 RX ORDER — PEMBROLIZUMAB 25 MG/ML
200 INJECTION, SOLUTION INTRAVENOUS ONCE
Refills: 0 | Status: COMPLETED | OUTPATIENT
Start: 2021-05-25 | End: 2021-05-25

## 2021-05-25 RX ORDER — PEMBROLIZUMAB 25 MG/ML
200 INJECTION, SOLUTION INTRAVENOUS ONCE
Refills: 0 | Status: DISCONTINUED | OUTPATIENT
Start: 2021-05-25 | End: 2021-05-25

## 2021-05-25 RX ADMIN — PEMBROLIZUMAB 216 MILLIGRAM(S): 25 INJECTION, SOLUTION INTRAVENOUS at 14:59

## 2021-05-25 RX ADMIN — PEMBROLIZUMAB 200 MILLIGRAM(S): 25 INJECTION, SOLUTION INTRAVENOUS at 15:30

## 2021-05-27 NOTE — ASSESSMENT
[FreeTextEntry1] : This is a 55 year old female with PMH of DM II,  diagnosed with FIGO IVB well to moderately differentiated endometrioid cancer with focal squamous differentiation and focal clear cell changes.  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 FIGO IVB endometrial cancer\par - s/p 5 cycles of  carboplatin (AUC 6) and taxol (175 mg/m2) every 3 weeks. \par - Was switched from Carboplatin/Taxol to Keytruda.  NGS testing sent and reviewed.\par \par Although the patient had a stage IV disease at diagnosis, with neoadjuvant chemo, she got downstaged.\par Had  d/w Rad/Onc if there is any benefit for RT: no role for RT\par \par  Pt had surgery on 3/8/21. Pathology report d/w pt and her family. Seems like she had a good response to neoadjuvant treatment. Reviewed the operative report, there was no gross residual disease left post surgery.\par 3/5/21 left pelvic Lns were positive for Ca, Pathologic stage pT1N1a Stage IIIC1\par \par PLAN:\par Will continue cycle #18 of Keytruda  mg once every 3 weeks until disease progression or unacceptable toxicity. CBC is adequate with Hgb 13.8 g/dL WBC 9.51 K/uL Platelet 200 K/uL.  has been stable, 24 on 2/23/21. Kidney function and liver enzymes are stable. \par - Side effects discussed including: Peripheral edema, cardiac arrhythmia, Fatigue, Pruritus, skin rash, Hyperglycemia, hypoalbuminemia, hypertriglyceridemia , hyponatremia , hypophosphatemia, hypocalcemia, decreased serum bicarbonate , hypercholesterolemia hypokalemia, hypoglycemia, hypercalcemia, hypothyroidism , hyperkalemia, Diarrhea , decreased appetite, constipation , abdominal pain , nausea, vomiting hematuria, anemia, lymphocytopenia, leukopenia, neutropenia, thrombocytopenia, hemorrhage, increased INR, prolonged partial thromboplastin time, abnormal liver enzymes.\par \par MONITORING as follows\par Liver enzymes at baseline and periodically during treatment; renal function; thyroid function (at baseline, periodically during treatment and as clinically indicated); glucose; CBC with differential;\par signs/symptoms of colitis, dermatologic toxicity, hypophysitis, thyroid disorders, pneumonitis and infusion reactions.\par \par The immunotherapy dose was adjusted according to pt's height, weight, labs and anticipated tolerance.\par The high risk of complications and complexity associated with antineoplastic therapy administration has been explained to the pt.\par Treatment will be administered under my direct supervision\par \par Labs ordered today includes CMP, TSH and Ca 125\par \par RTC in 3 weeks\par \par Case was seen and discussed with Dr. Singh who agreed with assessment and plan.\par \par

## 2021-05-27 NOTE — HISTORY OF PRESENT ILLNESS
[de-identified] : 54 yof, PMH of DM II, presented to ED in 10/2019 after experiencing worsening of chronic, intermittent pelvic pain and vaginal bleeding.  Patient underwent transvaginal ultrasound, which showed 3.5 x 2.7 x 4.4 cm cervical mass.  CT abdomen/pelvis revealed R iliac chain Ln 1.5 x 1.4 cm, along with 2.2 x 1.8 cm portacaval LNs, and enlargement of uterus.  Endometrial biopsy revealed well to moderately differentiated endometrioid cancer with focal squamous differentiation and focal clear cell changes.  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   CT chest negative for evidence of intrathoracic metastases.  Patient was initially scheduled for ALESHA/BSO on 11/25/2019.  However, MRI pelvis revealed L adnexal peritoneal tumor implants, enlarged pelvic and retroperitoneal lymph nodes, FIGO stage IVB.  ALESHA/BSO was subsequently canceled.  Patient recently saw Dr. Perez, who recommended chemotherapy alone.  Patient is currently only having occasional, mild vaginal bleeding and intermittent pelvic pain, which responds to tylenol.\par \par Family history is significant for history of uterine cancer in paternal grandmother (per patient, diagnosed at around 55 years) and "blood cancer" in maternal grandmother.\par \par Patient lives with her family and is fully functional at baseline.  She used to work as a  but is now no longer working.  She is also is in the process of applying for Medicaid. [de-identified] : Microsatellite status MSI-High §\par Tumor Mutational [place] 42 Muts/Mb §\par ARID1A P427fs*6\par ARID1A O4009er*15\par ATRX I127fs*7\par CASP8 splice site 305+1G>A\par CCND1 P287A\par CDC73 E325*\par CREBBP A146fs*6\par CTCF T204fs*26\par FGFR3 V630M\par FLCN H429fs*39\par JAK1 K860fs*16\par KRAS G12D\par MET O605ehl\par MSH3 K383fs*32\par NFE2L2 E82D\par PDGFRB R919W\par PIK3CA R38C\par PIK3R1 M959muk\par PTEN V166fs*14\par RNF43 G659fs*41\par TP53 D7H  [de-identified] : 1/20/20 : The patient is here for follow up .She received 2 doses of ferraheme prior to chemo . She tolerated carbo, taxol with mild symptoms of nausea and vomiting. She lost her hair , and reported mild numbness of fingers. She reported also minimal vaginal bleed. Her abdominal pain resolved and she is able to ambulate with no problems\par \par 2/10/2020: The patient is here for follow up , she tolerated her second cycle of chemotherapy with no problems , except for mild nausea and vomiting for the first few days. She also reported some mucousy discharge from vagina. She is eating well , no abdominal pain. \par \par 3/2/2020: The patient is here for follow up . She is tolerating chemotherapy with no major side effects except for nausea in the following days.  She had a CT C/A/P after her 3rd cycle which showed mixed response. She denies any vaginal bleed, no rectal pain on defecation, no abdominal pain. \par \par \par \par 3/23/2020: The patient is here for follow up. She is tolerating chemotherapy with no major side effects . She is reporting nausea in the following days after chemotherapy. She denies fever, chills, abdominal pain , no constipation or diarrhea . No vaginal bleeding\par \par 04/07/2020\par JAY BOWMAN a 54 year F is here today for follow up of FIGO IVB endometrial cancer.\par Had  Ronnie number [id number].\par S/p 4 cycles of carbo and taxol, tolerating well. Cancelled last chemo appt due to corona virus. \par Reports lower abdominal cramping 2/10 started today. Denies vaginal bleeding today. Did have spotting x 1 since last treatment.\par Denies NVD, fever, chills. \par Pt reports peripheral neuropathy in fingers b/l not bothersome \par Had GYN f/up 3 weeks ago. \par CBC reviewed: WBC 8.6, h/H 12.8/36.9%, , Neutrophils 5.59\par \par 4/28/20: \par History obtained through  Manish from [employer]. ID# [id number]\par Pt returns for a f/u vist. She is S/P 1st cycle of keytruda. She tolerated it fairly well. Abdominal pain has resolved. She continues to have mild yellowish vaginal discharge. No vaginal bleeding.\par \par 05/19/2020\par JAY BOWMAN a 54 year F is here today with son for follow up of FIGO IVB endometrial cancer. \par Pt prefers her son translates today's visit. \par Pt denies abdominal pain, vaginal bleeding, NVD, fever, chills. \par She is s/p 2nd cycle of Keytruda, tolerated well. \par  : 31\par \par 6/9/20\par Pt is here for follow up.\par No new complaints.\par Feels well. Denies any abd pain, nausea, vomiting, rash or fever.\par Has mild vag spotting\par \par 06/30/2020\par JAY BOWMAN a 54 year F is here today for follow up of FIGO IVB endometrial cancer. \par S/p 4 cycles of Keytruda.\par She feels well. Denies any new complaints.\par Denies fever, chills, SOB, vaginal bleeding, abdominal pain, distention. \par Last : 29\par CBC reviewed: H/H 13.5/39.2%.\par \par 7/21/2020\par 53 yo female accompanied by her son for follow up visit for endometrial cancer and evaluation prior to Keytruda #6.  She offers no new complaints.  \par Denies any fever, chills, SOB, vaginal bleeding, abdominal pain or distention.  Last : 35/39/68.\par CBC shows wbc=9.11, Hgb=13.8, agc=650, ANC=4.89.  H=1.39 on 6/30/2020. She was COVID tested 7/17/2020 not detected.\par \par 8/11/20\par Pt is here for follow up.\par No complaints of abd pain, N, v, D or vaginal bleeding.\par Has mild tingling of feet and finger tips.\par No fever.\par \par 9/1/20\par Pt is here for follow up.\par Tolerating Keytruda well.\par Patient denies abdominal pain or bloating, denies vaginal bleeding or discharge.\par Patient denies shortness of breath, denies fever, denies bone pain.\par Has occasional vaginal discomfort.\par \par 9/22/20\par Pt is here  for follow up.\par No new complaints, Denies abdominal pain, N, V, d\par No vag bleeding\par No SOB.\par \par 10/13/20\par Pt is here for follow up.\par No vag bleeding, N, V, D, abd pain.\par No side effects from immunotherapy\par \par 11/3/20\par Pt is here for follow up.\par No new complaints.\par No N, V, D, Abd pain.\par No vag bleeding.\par Tolerating immunotherapy well.\par \par 11/24/2020\par Patient is here today for follow up visit for endometrial cancer.  She is tolerating immunotherapy-  Keytruda well.  She offers no new complaints except for mild paresthesias.  Denies any N/V/D/C or vaginal bleeding.  Last =28.\par \par 12/15/20\par Pt is here for follow up visit.\par No complaints.\par Feels well, no abd pain, n. V, diarrhea.\par No vag bleeding.\par has not made appt for Ct scans yet\par \par 1/5/2021\par Pt is her to f/u for endometrial cancer\par No complaints of abdominal pain, or vaginal bleeding\par Feels well with good appetite\par Pt requesting  flu vaccine\par \par 1/26/21\par Pt is here for follow up. Feels well.\par Saw GYN and was offered interval debulking scheduled for 2/15\par No vag bleeding\par \par 2/16/21\par Pt is here for follow up.\par Her surgery has been rescheduled to 3/1/21\par Feels well, no abd pain, N, V, D, vag bleeding\par States her sugar levels have been running high. She will be seeing her PCP\par \par 3/23/21\par Pt is here for follow up. She is s/p surgery 08-Mar-2021 .\par She underwent Peritoneal biopsy \par Pelvic and para-aortic lymphadenectomy\par \par Robot-assisted omentectomy\par Hysterectomy, total,  with salpingo-oophorectomy \par Operative Findings:\par - Operative Findings EUA: Uterus 14w size, normal external genitalia, no gross\par lesions.\par Laparoscopy: Uterus, cervix bilateral fallopian tubes and ovaries grossly\par normal. Anterior abdominal wall nodularity likely cancer responding to\par treatment. No gross tumor visualized, upper abdomen without gross diease.\par Bilateral enlarged bulky pelvic LN 2-3cm in size.\par \par Pathology as noted below\par \par Pt is recovering well from surgery. Has Nl Bowel habits. No post op complications.\par Has been having high bld sugar, will talk to PCP\par \par 4/13/21\par pt is here for follow up and treatment. feels well.No abd pain, N, V, d\par No vag bleeding\par \par 5/7/21\par Pt is here for a follow up.\par Feels well. No new complaints.\par No abd pain, rash, vag bleeding\par No N, V, D, cough.\par \par 5/25/2021\par Pt is here to f/u for endometrial cancer.\par She is s/p Ketruda cycle #17, tolerating well.\par She feels well today, appetite is good.\par She denies abdominal pain, nausea/vomiting, or diarrhea.\par She c/o of mild tingling of finger and feet.

## 2021-05-27 NOTE — CONSULT LETTER
[DrShabbir  ___] : Dr. THAKUR [Dear  ___] : Dear  [unfilled], [Courtesy Letter:] : I had the pleasure of seeing your patient, [unfilled], in my office today. [Please see my note below.] : Please see my note below. [Consult Closing:] : Thank you very much for allowing me to participate in the care of this patient.  If you have any questions, please do not hesitate to contact me. [Sincerely,] : Sincerely, [FreeTextEntry2] : Dr. ELLYN Campuzano [FreeTextEntry3] : Dr. KALI Singh (covering for Dr. Willard)

## 2021-05-27 NOTE — REVIEW OF SYSTEMS
[Negative] : Psychiatric [Fatigue] : no fatigue [Vaginal Discharge] : no vaginal discharge [de-identified] : Mild numbness in fingers and feet

## 2021-05-27 NOTE — PHYSICAL EXAM
[Fully active, able to carry on all pre-disease performance without restriction] : Status 0 - Fully active, able to carry on all pre-disease performance without restriction [Normal] : PERRL, EOMI, no conjunctival infection, anicteric [de-identified] : Healing laparotomy scars, no tenderness

## 2021-05-31 LAB
ALBUMIN SERPL ELPH-MCNC: 4.5 G/DL
ALP BLD-CCNC: 81 U/L
ALT SERPL-CCNC: 17 U/L
ANION GAP SERPL CALC-SCNC: 12 MMOL/L
AST SERPL-CCNC: 18 U/L
BILIRUB SERPL-MCNC: 0.3 MG/DL
BUN SERPL-MCNC: 11 MG/DL
CALCIUM SERPL-MCNC: 9.6 MG/DL
CHLORIDE SERPL-SCNC: 98 MMOL/L
CO2 SERPL-SCNC: 24 MMOL/L
CREAT SERPL-MCNC: 0.5 MG/DL
GLUCOSE SERPL-MCNC: 219 MG/DL
HCT VFR BLD CALC: 39.7 %
HGB BLD-MCNC: 13.8 G/DL
MCHC RBC-ENTMCNC: 28.9 PG
MCHC RBC-ENTMCNC: 34.8 G/DL
MCV RBC AUTO: 83.1 FL
PLATELET # BLD AUTO: 200 K/UL
PMV BLD: 11.4 FL
POTASSIUM SERPL-SCNC: 4.2 MMOL/L
PROT SERPL-MCNC: 7.8 G/DL
RBC # BLD: 4.78 M/UL
RBC # FLD: 13.9 %
SODIUM SERPL-SCNC: 134 MMOL/L
WBC # FLD AUTO: 9.51 K/UL

## 2021-06-15 ENCOUNTER — APPOINTMENT (OUTPATIENT)
Dept: HEMATOLOGY ONCOLOGY | Facility: CLINIC | Age: 56
End: 2021-06-15
Payer: COMMERCIAL

## 2021-06-15 ENCOUNTER — LABORATORY RESULT (OUTPATIENT)
Age: 56
End: 2021-06-15

## 2021-06-15 ENCOUNTER — APPOINTMENT (OUTPATIENT)
Dept: INFUSION THERAPY | Facility: CLINIC | Age: 56
End: 2021-06-15
Payer: COMMERCIAL

## 2021-06-15 VITALS
HEIGHT: 60 IN | DIASTOLIC BLOOD PRESSURE: 70 MMHG | SYSTOLIC BLOOD PRESSURE: 113 MMHG | HEART RATE: 68 BPM | WEIGHT: 132 LBS | BODY MASS INDEX: 25.91 KG/M2 | TEMPERATURE: 98.3 F

## 2021-06-15 LAB
ALBUMIN SERPL ELPH-MCNC: 4.5 G/DL
ALP BLD-CCNC: 72 U/L
ALT SERPL-CCNC: 20 U/L
ANION GAP SERPL CALC-SCNC: 11 MMOL/L
AST SERPL-CCNC: 20 U/L
BILIRUB SERPL-MCNC: 0.5 MG/DL
BUN SERPL-MCNC: 11 MG/DL
CALCIUM SERPL-MCNC: 9.5 MG/DL
CHLORIDE SERPL-SCNC: 99 MMOL/L
CO2 SERPL-SCNC: 25 MMOL/L
CREAT SERPL-MCNC: 0.6 MG/DL
GLUCOSE SERPL-MCNC: 172 MG/DL
HCT VFR BLD CALC: 40.1 %
HGB BLD-MCNC: 13.5 G/DL
MCHC RBC-ENTMCNC: 28.5 PG
MCHC RBC-ENTMCNC: 33.7 G/DL
MCV RBC AUTO: 84.6 FL
PLATELET # BLD AUTO: 235 K/UL
PMV BLD: 11.3 FL
POTASSIUM SERPL-SCNC: 5 MMOL/L
PROT SERPL-MCNC: 7.6 G/DL
RBC # BLD: 4.74 M/UL
RBC # FLD: 14.6 %
SODIUM SERPL-SCNC: 135 MMOL/L
WBC # FLD AUTO: 9.08 K/UL

## 2021-06-15 PROCEDURE — 99215 OFFICE O/P EST HI 40 MIN: CPT

## 2021-06-15 RX ORDER — PEMBROLIZUMAB 25 MG/ML
200 INJECTION, SOLUTION INTRAVENOUS ONCE
Refills: 0 | Status: COMPLETED | OUTPATIENT
Start: 2021-06-15 | End: 2021-06-15

## 2021-06-15 RX ADMIN — PEMBROLIZUMAB 216 MILLIGRAM(S): 25 INJECTION, SOLUTION INTRAVENOUS at 12:53

## 2021-06-15 RX ADMIN — PEMBROLIZUMAB 200 MILLIGRAM(S): 25 INJECTION, SOLUTION INTRAVENOUS at 13:23

## 2021-06-15 NOTE — HISTORY OF PRESENT ILLNESS
[de-identified] : 54 yof, PMH of DM II, presented to ED in 10/2019 after experiencing worsening of chronic, intermittent pelvic pain and vaginal bleeding.  Patient underwent transvaginal ultrasound, which showed 3.5 x 2.7 x 4.4 cm cervical mass.  CT abdomen/pelvis revealed R iliac chain Ln 1.5 x 1.4 cm, along with 2.2 x 1.8 cm portacaval LNs, and enlargement of uterus.  Endometrial biopsy revealed well to moderately differentiated endometrioid cancer with focal squamous differentiation and focal clear cell changes.  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   CT chest negative for evidence of intrathoracic metastases.  Patient was initially scheduled for ALESHA/BSO on 11/25/2019.  However, MRI pelvis revealed L adnexal peritoneal tumor implants, enlarged pelvic and retroperitoneal lymph nodes, FIGO stage IVB.  ALESHA/BSO was subsequently canceled.  Patient recently saw Dr. Perez, who recommended chemotherapy alone.  Patient is currently only having occasional, mild vaginal bleeding and intermittent pelvic pain, which responds to tylenol.\par \par Family history is significant for history of uterine cancer in paternal grandmother (per patient, diagnosed at around 55 years) and "blood cancer" in maternal grandmother.\par \par Patient lives with her family and is fully functional at baseline.  She used to work as a  but is now no longer working.  She is also is in the process of applying for Medicaid. [de-identified] : Microsatellite status MSI-High §\par Tumor Mutational [place] 42 Muts/Mb §\par ARID1A P427fs*6\par ARID1A B7348xk*15\par ATRX I127fs*7\par CASP8 splice site 305+1G>A\par CCND1 P287A\par CDC73 E325*\par CREBBP A146fs*6\par CTCF T204fs*26\par FGFR3 V630M\par FLCN H429fs*39\par JAK1 K860fs*16\par KRAS G12D\par MET W561lgi\par MSH3 K383fs*32\par NFE2L2 E82D\par PDGFRB R919W\par PIK3CA R38C\par PIK3R1 H874wgj\par PTEN V166fs*14\par RNF43 G659fs*41\par TP53 D7H  [de-identified] : 1/20/20 : The patient is here for follow up .She received 2 doses of ferraheme prior to chemo . She tolerated carbo, taxol with mild symptoms of nausea and vomiting. She lost her hair , and reported mild numbness of fingers. She reported also minimal vaginal bleed. Her abdominal pain resolved and she is able to ambulate with no problems\par \par 2/10/2020: The patient is here for follow up , she tolerated her second cycle of chemotherapy with no problems , except for mild nausea and vomiting for the first few days. She also reported some mucousy discharge from vagina. She is eating well , no abdominal pain. \par \par 3/2/2020: The patient is here for follow up . She is tolerating chemotherapy with no major side effects except for nausea in the following days.  She had a CT C/A/P after her 3rd cycle which showed mixed response. She denies any vaginal bleed, no rectal pain on defecation, no abdominal pain. \par \par \par \par 3/23/2020: The patient is here for follow up. She is tolerating chemotherapy with no major side effects . She is reporting nausea in the following days after chemotherapy. She denies fever, chills, abdominal pain , no constipation or diarrhea . No vaginal bleeding\par \par 04/07/2020\par JAY BOWMAN a 54 year F is here today for follow up of FIGO IVB endometrial cancer.\par Had  Ronnie number [id number].\par S/p 4 cycles of carbo and taxol, tolerating well. Cancelled last chemo appt due to corona virus. \par Reports lower abdominal cramping 2/10 started today. Denies vaginal bleeding today. Did have spotting x 1 since last treatment.\par Denies NVD, fever, chills. \par Pt reports peripheral neuropathy in fingers b/l not bothersome \par Had GYN f/up 3 weeks ago. \par CBC reviewed: WBC 8.6, h/H 12.8/36.9%, , Neutrophils 5.59\par \par 4/28/20: \par History obtained through  Manish from [employer]. ID# [id number]\par Pt returns for a f/u vist. She is S/P 1st cycle of keytruda. She tolerated it fairly well. Abdominal pain has resolved. She continues to have mild yellowish vaginal discharge. No vaginal bleeding.\par \par 05/19/2020\par JAY BOWMAN a 54 year F is here today with son for follow up of FIGO IVB endometrial cancer. \par Pt prefers her son translates today's visit. \par Pt denies abdominal pain, vaginal bleeding, NVD, fever, chills. \par She is s/p 2nd cycle of Keytruda, tolerated well. \par  : 31\par \par 6/9/20\par Pt is here for follow up.\par No new complaints.\par Feels well. Denies any abd pain, nausea, vomiting, rash or fever.\par Has mild vag spotting\par \par 06/30/2020\par JAY BOWMAN a 54 year F is here today for follow up of FIGO IVB endometrial cancer. \par S/p 4 cycles of Keytruda.\par She feels well. Denies any new complaints.\par Denies fever, chills, SOB, vaginal bleeding, abdominal pain, distention. \par Last : 29\par CBC reviewed: H/H 13.5/39.2%.\par \par 7/21/2020\par 55 yo female accompanied by her son for follow up visit for endometrial cancer and evaluation prior to Keytruda #6.  She offers no new complaints.  \par Denies any fever, chills, SOB, vaginal bleeding, abdominal pain or distention.  Last : 35/39/68.\par CBC shows wbc=9.11, Hgb=13.8, ech=146, ANC=4.89.  H=1.39 on 6/30/2020. She was COVID tested 7/17/2020 not detected.\par \par 8/11/20\par Pt is here for follow up.\par No complaints of abd pain, N, v, D or vaginal bleeding.\par Has mild tingling of feet and finger tips.\par No fever.\par \par 9/1/20\par Pt is here for follow up.\par Tolerating Keytruda well.\par Patient denies abdominal pain or bloating, denies vaginal bleeding or discharge.\par Patient denies shortness of breath, denies fever, denies bone pain.\par Has occasional vaginal discomfort.\par \par 9/22/20\par Pt is here  for follow up.\par No new complaints, Denies abdominal pain, N, V, d\par No vag bleeding\par No SOB.\par \par 10/13/20\par Pt is here for follow up.\par No vag bleeding, N, V, D, abd pain.\par No side effects from immunotherapy\par \par 11/3/20\par Pt is here for follow up.\par No new complaints.\par No N, V, D, Abd pain.\par No vag bleeding.\par Tolerating immunotherapy well.\par \par 11/24/2020\par Patient is here today for follow up visit for endometrial cancer.  She is tolerating immunotherapy-  Keytruda well.  She offers no new complaints except for mild paresthesias.  Denies any N/V/D/C or vaginal bleeding.  Last =28.\par \par 12/15/20\par Pt is here for follow up visit.\par No complaints.\par Feels well, no abd pain, n. V, diarrhea.\par No vag bleeding.\par has not made appt for Ct scans yet\par \par 1/5/2021\par Pt is her to f/u for endometrial cancer\par No complaints of abdominal pain, or vaginal bleeding\par Feels well with good appetite\par Pt requesting  flu vaccine\par \par 1/26/21\par Pt is here for follow up. Feels well.\par Saw GYN and was offered interval debulking scheduled for 2/15\par No vag bleeding\par \par 2/16/21\par Pt is here for follow up.\par Her surgery has been rescheduled to 3/1/21\par Feels well, no abd pain, N, V, D, vag bleeding\par States her sugar levels have been running high. She will be seeing her PCP\par \par 3/23/21\par Pt is here for follow up. She is s/p surgery 08-Mar-2021 .\par She underwent Peritoneal biopsy \par Pelvic and para-aortic lymphadenectomy\par \par Robot-assisted omentectomy\par Hysterectomy, total,  with salpingo-oophorectomy \par Operative Findings:\par - Operative Findings EUA: Uterus 14w size, normal external genitalia, no gross\par lesions.\par Laparoscopy: Uterus, cervix bilateral fallopian tubes and ovaries grossly\par normal. Anterior abdominal wall nodularity likely cancer responding to\par treatment. No gross tumor visualized, upper abdomen without gross diease.\par Bilateral enlarged bulky pelvic LN 2-3cm in size.\par \par Pathology as noted below\par \par Pt is recovering well from surgery. Has Nl Bowel habits. No post op complications.\par Has been having high bld sugar, will talk to PCP\par \par 4/13/21\par pt is here for follow up and treatment. feels well.No abd pain, N, V, d\par No vag bleeding\par \par 5/7/21\par Pt is here for a follow up.\par Feels well. No new complaints.\par No abd pain, rash, vag bleeding\par No N, V, D, cough.\par \par 5/25/2021\par Pt is here to f/u for endometrial cancer.\par She is s/p Ketruda cycle #17, tolerating well.\par She feels well today, appetite is good.\par She denies abdominal pain, nausea/vomiting, or diarrhea.\par She c/o of mild tingling of finger and feet.\par \par 6/15/21\par Pt  is here for folow up.\par No abd pain, N, V, d, constipation.\par No vag bleeding

## 2021-06-15 NOTE — PHYSICAL EXAM
[Fully active, able to carry on all pre-disease performance without restriction] : Status 0 - Fully active, able to carry on all pre-disease performance without restriction [Normal] : affect appropriate [de-identified] : Healed laparotomy scars, no tenderness

## 2021-06-15 NOTE — ASSESSMENT
[FreeTextEntry1] : This is a 55 year old female with PMH of DM II,  diagnosed with FIGO IVB well to moderately differentiated endometrioid cancer with focal squamous differentiation and focal clear cell changes.  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 FIGO IVB endometrial cancer\par - s/p 5 cycles of  carboplatin (AUC 6) and taxol (175 mg/m2) every 3 weeks. \par - Was switched from Carboplatin/Taxol to Keytruda.  NGS testing sent and reviewed.\par \par Although the patient had a stage IV disease at diagnosis, with neoadjuvant chemo, she got downstaged.\par Had  d/w Rad/Onc if there is any benefit for RT: no role for RT\par \par  Pt had surgery on 3/8/21. Pathology report d/w pt and her family. Seems like she had a good response to neoadjuvant treatment. Reviewed the operative report, there was no gross residual disease left post surgery.\par 3/5/21 left pelvic Lns were positive for Ca, Pathologic stage pT1N1a Stage IIIC1\par \par PLAN:\par Will continue cycle #19 of Keytruda  mg once every 3 weeks until disease progression or unacceptable toxicity. CBC is adequate.  has been stable\par \par Kidney function and liver enzymes are stable. \par - Side effects discussed including: Peripheral edema, cardiac arrhythmia, Fatigue, Pruritus, skin rash, Hyperglycemia, hypoalbuminemia, hypertriglyceridemia , hyponatremia , hypophosphatemia, hypocalcemia, decreased serum bicarbonate , hypercholesterolemia hypokalemia, hypoglycemia, hypercalcemia, hypothyroidism , hyperkalemia, Diarrhea , decreased appetite, constipation , abdominal pain , nausea, vomiting hematuria, anemia, lymphocytopenia, leukopenia, neutropenia, thrombocytopenia, hemorrhage, increased INR, prolonged partial thromboplastin time, abnormal liver enzymes.\par \par MONITORING as follows\par Liver enzymes at baseline and periodically during treatment; renal function; thyroid function (at baseline, periodically during treatment and as clinically indicated); glucose; CBC with differential;\par signs/symptoms of colitis, dermatologic toxicity, hypophysitis, thyroid disorders, pneumonitis and infusion reactions.\par \par The immunotherapy dose was adjusted according to pt's height, weight, labs and anticipated tolerance.\par The high risk of complications and complexity associated with antineoplastic therapy administration has been explained to the pt.\par Treatment will be administered under my direct supervision\par \par Labs ordered today includes CMP, TSH and Ca 125\par \par RTC in 3 weeks\par \par

## 2021-06-15 NOTE — CONSULT LETTER
[Dear  ___] : Dear  [unfilled], [Courtesy Letter:] : I had the pleasure of seeing your patient, [unfilled], in my office today. [Please see my note below.] : Please see my note below. [Consult Closing:] : Thank you very much for allowing me to participate in the care of this patient.  If you have any questions, please do not hesitate to contact me. [Sincerely,] : Sincerely, [DrShabbir  ___] : Dr. THAKUR [FreeTextEntry2] : Dr. ELLYN Campuzano [FreeTextEntry3] : Dr. KALI Singh (covering for Dr. Willard)

## 2021-06-15 NOTE — REVIEW OF SYSTEMS
[Negative] : Heme/Lymph [Fatigue] : no fatigue [Vaginal Discharge] : no vaginal discharge [de-identified] : Mild numbness in fingers and feet

## 2021-06-16 LAB — TSH SERPL-ACNC: 1.52 UIU/ML

## 2021-07-06 ENCOUNTER — LABORATORY RESULT (OUTPATIENT)
Age: 56
End: 2021-07-06

## 2021-07-06 ENCOUNTER — APPOINTMENT (OUTPATIENT)
Dept: HEMATOLOGY ONCOLOGY | Facility: CLINIC | Age: 56
End: 2021-07-06
Payer: COMMERCIAL

## 2021-07-06 ENCOUNTER — APPOINTMENT (OUTPATIENT)
Dept: INFUSION THERAPY | Facility: CLINIC | Age: 56
End: 2021-07-06
Payer: COMMERCIAL

## 2021-07-06 VITALS
HEART RATE: 57 BPM | TEMPERATURE: 97 F | SYSTOLIC BLOOD PRESSURE: 130 MMHG | WEIGHT: 131 LBS | BODY MASS INDEX: 25.72 KG/M2 | DIASTOLIC BLOOD PRESSURE: 77 MMHG | HEIGHT: 60 IN

## 2021-07-06 LAB
HCT VFR BLD CALC: 37.6 %
HGB BLD-MCNC: 13 G/DL
MCHC RBC-ENTMCNC: 28.6 PG
MCHC RBC-ENTMCNC: 34.6 G/DL
MCV RBC AUTO: 82.8 FL
PLATELET # BLD AUTO: 207 K/UL
PMV BLD: 12 FL
RBC # BLD: 4.54 M/UL
RBC # FLD: 14.5 %
WBC # FLD AUTO: 9.31 K/UL

## 2021-07-06 PROCEDURE — 99215 OFFICE O/P EST HI 40 MIN: CPT

## 2021-07-06 RX ORDER — PEMBROLIZUMAB 25 MG/ML
200 INJECTION, SOLUTION INTRAVENOUS ONCE
Refills: 0 | Status: COMPLETED | OUTPATIENT
Start: 2021-07-06 | End: 2021-07-06

## 2021-07-06 RX ADMIN — PEMBROLIZUMAB 200 MILLIGRAM(S): 25 INJECTION, SOLUTION INTRAVENOUS at 15:02

## 2021-07-06 RX ADMIN — PEMBROLIZUMAB 216 MILLIGRAM(S): 25 INJECTION, SOLUTION INTRAVENOUS at 14:31

## 2021-07-07 LAB
ALBUMIN SERPL ELPH-MCNC: 4.4 G/DL
ALP BLD-CCNC: 71 U/L
ALT SERPL-CCNC: 22 U/L
ANION GAP SERPL CALC-SCNC: 10 MMOL/L
AST SERPL-CCNC: 22 U/L
BILIRUB SERPL-MCNC: 0.3 MG/DL
BUN SERPL-MCNC: 8 MG/DL
CALCIUM SERPL-MCNC: 10.2 MG/DL
CANCER AG125 SERPL-ACNC: 11 U/ML
CHLORIDE SERPL-SCNC: 96 MMOL/L
CO2 SERPL-SCNC: 26 MMOL/L
CREAT SERPL-MCNC: 0.6 MG/DL
GLUCOSE SERPL-MCNC: 196 MG/DL
POTASSIUM SERPL-SCNC: 5.2 MMOL/L
PROT SERPL-MCNC: 7.8 G/DL
SODIUM SERPL-SCNC: 132 MMOL/L

## 2021-07-09 NOTE — REVIEW OF SYSTEMS
[Negative] : Heme/Lymph [Fatigue] : no fatigue [Vaginal Discharge] : no vaginal discharge [de-identified] : Mild numbness in fingers and feet

## 2021-07-09 NOTE — PHYSICAL EXAM
[Fully active, able to carry on all pre-disease performance without restriction] : Status 0 - Fully active, able to carry on all pre-disease performance without restriction [Normal] : affect appropriate [de-identified] : Healed laparotomy scars, no tenderness

## 2021-07-09 NOTE — CONSULT LETTER
[Dear  ___] : Dear  [unfilled], [Courtesy Letter:] : I had the pleasure of seeing your patient, [unfilled], in my office today. [Please see my note below.] : Please see my note below. [Consult Closing:] : Thank you very much for allowing me to participate in the care of this patient.  If you have any questions, please do not hesitate to contact me. [Sincerely,] : Sincerely, [DrShabbir  ___] : Dr. THAKUR [FreeTextEntry2] : Dr. ELLYN Campuzano [FreeTextEntry3] : Demetrice Willard MD\par Professor [employer]\par Adam/Dorian\par Attending Physician\par [facility] [place]\par [phone]\par \par

## 2021-07-09 NOTE — HISTORY OF PRESENT ILLNESS
[de-identified] : 54 yof, PMH of DM II, presented to ED in 10/2019 after experiencing worsening of chronic, intermittent pelvic pain and vaginal bleeding.  Patient underwent transvaginal ultrasound, which showed 3.5 x 2.7 x 4.4 cm cervical mass.  CT abdomen/pelvis revealed R iliac chain Ln 1.5 x 1.4 cm, along with 2.2 x 1.8 cm portacaval LNs, and enlargement of uterus.  Endometrial biopsy revealed well to moderately differentiated endometrioid cancer with focal squamous differentiation and focal clear cell changes.  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   CT chest negative for evidence of intrathoracic metastases.  Patient was initially scheduled for ALESHA/BSO on 11/25/2019.  However, MRI pelvis revealed L adnexal peritoneal tumor implants, enlarged pelvic and retroperitoneal lymph nodes, FIGO stage IVB.  ALESHA/BSO was subsequently canceled.  Patient recently saw Dr. Perez, who recommended chemotherapy alone.  Patient is currently only having occasional, mild vaginal bleeding and intermittent pelvic pain, which responds to tylenol.\par \par Family history is significant for history of uterine cancer in paternal grandmother (per patient, diagnosed at around 55 years) and "blood cancer" in maternal grandmother.\par \par Patient lives with her family and is fully functional at baseline.  She used to work as a  but is now no longer working.  She is also is in the process of applying for Medicaid. [de-identified] : Microsatellite status MSI-High §\par Tumor Mutational [place] 42 Muts/Mb §\par ARID1A P427fs*6\par ARID1A P0589pg*15\par ATRX I127fs*7\par CASP8 splice site 305+1G>A\par CCND1 P287A\par CDC73 E325*\par CREBBP A146fs*6\par CTCF T204fs*26\par FGFR3 V630M\par FLCN H429fs*39\par JAK1 K860fs*16\par KRAS G12D\par MET C915qev\par MSH3 K383fs*32\par NFE2L2 E82D\par PDGFRB R919W\par PIK3CA R38C\par PIK3R1 I455bjz\par PTEN V166fs*14\par RNF43 G659fs*41\par TP53 D7H  [de-identified] : 1/20/20 : The patient is here for follow up .She received 2 doses of ferraheme prior to chemo . She tolerated carbo, taxol with mild symptoms of nausea and vomiting. She lost her hair , and reported mild numbness of fingers. She reported also minimal vaginal bleed. Her abdominal pain resolved and she is able to ambulate with no problems\par \par 2/10/2020: The patient is here for follow up , she tolerated her second cycle of chemotherapy with no problems , except for mild nausea and vomiting for the first few days. She also reported some mucousy discharge from vagina. She is eating well , no abdominal pain. \par \par 3/2/2020: The patient is here for follow up . She is tolerating chemotherapy with no major side effects except for nausea in the following days.  She had a CT C/A/P after her 3rd cycle which showed mixed response. She denies any vaginal bleed, no rectal pain on defecation, no abdominal pain. \par \par \par \par 3/23/2020: The patient is here for follow up. She is tolerating chemotherapy with no major side effects . She is reporting nausea in the following days after chemotherapy. She denies fever, chills, abdominal pain , no constipation or diarrhea . No vaginal bleeding\par \par 04/07/2020\par JAY BOWMAN a 54 year F is here today for follow up of FIGO IVB endometrial cancer.\par Had  Ronnie number [id number].\par S/p 4 cycles of carbo and taxol, tolerating well. Cancelled last chemo appt due to corona virus. \par Reports lower abdominal cramping 2/10 started today. Denies vaginal bleeding today. Did have spotting x 1 since last treatment.\par Denies NVD, fever, chills. \par Pt reports peripheral neuropathy in fingers b/l not bothersome \par Had GYN f/up 3 weeks ago. \par CBC reviewed: WBC 8.6, h/H 12.8/36.9%, , Neutrophils 5.59\par \par 4/28/20: \par History obtained through  Man[facility] from [employer]. ID# [id number]\par Pt returns for a f/u vist. She is S/P 1st cycle of keytruda. She tolerated it fairly well. Abdominal pain has resolved. She continues to have mild yellowish vaginal discharge. No vaginal bleeding.\par \par 05/19/2020\par JAY BOWMAN a 54 year F is here today with son for follow up of FIGO IVB endometrial cancer. \par Pt prefers her son translates today's visit. \par Pt denies abdominal pain, vaginal bleeding, NVD, fever, chills. \par She is s/p 2nd cycle of Keytruda, tolerated well. \par  : 31\par \par 6/9/20\par Pt is here for follow up.\par No new complaints.\par Feels well. Denies any abd pain, nausea, vomiting, rash or fever.\par Has mild vag spotting\par \par 06/30/2020\par JAY BOWMAN a 54 year F is here today for follow up of FIGO IVB endometrial cancer. \par S/p 4 cycles of Keytruda.\par She feels well. Denies any new complaints.\par Denies fever, chills, SOB, vaginal bleeding, abdominal pain, distention. \par Last : 29\par CBC reviewed: H/H 13.5/39.2%.\par \par 7/21/2020\par 53 yo female accompanied by her son for follow up visit for endometrial cancer and evaluation prior to Keytruda #6.  She offers no new complaints.  \par Denies any fever, chills, SOB, vaginal bleeding, abdominal pain or distention.  Last : 35/39/68.\par CBC shows wbc=9.11, Hgb=13.8, wsl=137, ANC=4.89.  H=1.39 on 6/30/2020. She was COVID tested 7/17/2020 not detected.\par \par 8/11/20\par Pt is here for follow up.\par No complaints of abd pain, N, v, D or vaginal bleeding.\par Has mild tingling of feet and finger tips.\par No fever.\par \par 9/1/20\par Pt is here for follow up.\par Tolerating Keytruda well.\par Patient denies abdominal pain or bloating, denies vaginal bleeding or discharge.\par Patient denies shortness of breath, denies fever, denies bone pain.\par Has occasional vaginal discomfort.\par \par 9/22/20\par Pt is here  for follow up.\par No new complaints, Denies abdominal pain, N, V, d\par No vag bleeding\par No SOB.\par \par 10/13/20\par Pt is here for follow up.\par No vag bleeding, N, V, D, abd pain.\par No side effects from immunotherapy\par \par 11/3/20\par Pt is here for follow up.\par No new complaints.\par No N, V, D, Abd pain.\par No vag bleeding.\par Tolerating immunotherapy well.\par \par 11/24/2020\par Patient is here today for follow up visit for endometrial cancer.  She is tolerating immunotherapy-  Keytruda well.  She offers no new complaints except for mild paresthesias.  Denies any N/V/D/C or vaginal bleeding.  Last =28.\par \par 12/15/20\par Pt is here for follow up visit.\par No complaints.\par Feels well, no abd pain, n. V, diarrhea.\par No vag bleeding.\par has not made appt for Ct scans yet\par \par 1/5/2021\par Pt is her to f/u for endometrial cancer\par No complaints of abdominal pain, or vaginal bleeding\par Feels well with good appetite\par Pt requesting  flu vaccine\par \par 1/26/21\par Pt is here for follow up. Feels well.\par Saw GYN and was offered interval debulking scheduled for 2/15\par No vag bleeding\par \par 2/16/21\par Pt is here for follow up.\par Her surgery has been rescheduled to 3/1/21\par Feels well, no abd pain, N, V, D, vag bleeding\par States her sugar levels have been running high. She will be seeing her PCP\par \par 3/23/21\par Pt is here for follow up. She is s/p surgery 08-Mar-2021 .\par She underwent Peritoneal biopsy \par Pelvic and para-aortic lymphadenectomy\par \par Robot-assisted omentectomy\par Hysterectomy, total,  with salpingo-oophorectomy \par Operative Findings:\par - Operative Findings EUA: Uterus 14w size, normal external genitalia, no gross\par lesions.\par Laparoscopy: Uterus, cervix bilateral fallopian tubes and ovaries grossly\par normal. Anterior abdominal wall nodularity likely cancer responding to\par treatment. No gross tumor visualized, upper abdomen without gross diease.\par Bilateral enlarged bulky pelvic LN 2-3cm in size.\par \par Pathology as noted below\par \par Pt is recovering well from surgery. Has Nl Bowel habits. No post op complications.\par Has been having high bld sugar, will talk to PCP\par \par 4/13/21\par pt is here for follow up and treatment. feels well.No abd pain, N, V, d\par No vag bleeding\par \par 5/7/21\par Pt is here for a follow up.\par Feels well. No new complaints.\par No abd pain, rash, vag bleeding\par No N, V, D, cough.\par \par 5/25/2021\par Pt is here to f/u for endometrial cancer.\par She is s/p Ketruda cycle #17, tolerating well.\par She feels well today, appetite is good.\par She denies abdominal pain, nausea/vomiting, or diarrhea.\par She c/o of mild tingling of finger and feet.\par \par 6/15/21\par Pt  is here for folow up.\par No abd pain, N, V, d, constipation.\par No vag bleeding\par \par 7/6/21\par Pt is here for follow up.\par Feels well. No new symptoms.\par No abdominal pain, N, V, D, vag bleeding.

## 2021-07-09 NOTE — ASSESSMENT
[FreeTextEntry1] : This is a 55 year old female with PMH of DM II,  diagnosed with FIGO IVB well to moderately differentiated endometrioid cancer with focal squamous differentiation and focal clear cell changes.  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 FIGO IVB endometrial cancer\par - s/p 5 cycles of  carboplatin (AUC 6) and taxol (175 mg/m2) every 3 weeks. \par - Was switched from Carboplatin/Taxol to Keytruda.  NGS testing sent and reviewed.\par \par Although the patient had a stage IV disease at diagnosis, with neoadjuvant chemo, she got downstaged.\par Had  d/w Rad/Onc if there is any benefit for RT: no role for RT\par \par  Pt had surgery on 3/8/21. Pathology report d/w pt and her family. Seems like she had a good response to neoadjuvant treatment. Reviewed the operative report, there was no gross residual disease left post surgery.\par 3/5/21 left pelvic Lns were positive for Ca, Pathologic stage pT1N1a Stage IIIC1\par \par PLAN:\par Will continue cycle # 20 of Keytruda  mg once every 3 weeks until disease progression or unacceptable toxicity. CBC is adequate.  has been stable\par \par Kidney function and liver enzymes are stable. \par - Side effects discussed including: Peripheral edema, cardiac arrhythmia, Fatigue, Pruritus, skin rash, Hyperglycemia, hypoalbuminemia, hypertriglyceridemia , hyponatremia , hypophosphatemia, hypocalcemia, decreased serum bicarbonate , hypercholesterolemia hypokalemia, hypoglycemia, hypercalcemia, hypothyroidism , hyperkalemia, Diarrhea , decreased appetite, constipation , abdominal pain , nausea, vomiting hematuria, anemia, lymphocytopenia, leukopenia, neutropenia, thrombocytopenia, hemorrhage, increased INR, prolonged partial thromboplastin time, abnormal liver enzymes.\par \par MONITORING as follows\par Liver enzymes at baseline and periodically during treatment; renal function; thyroid function (at baseline, periodically during treatment and as clinically indicated); glucose; CBC with differential;\par signs/symptoms of colitis, dermatologic toxicity, hypophysitis, thyroid disorders, pneumonitis and infusion reactions.\par \par The immunotherapy dose was adjusted according to pt's height, weight, labs and anticipated tolerance.\par The high risk of complications and complexity associated with antineoplastic therapy administration has been explained to the pt.\par Treatment will be administered under my direct supervision\par \par Labs ordered today includes CMP, TSH and Ca 125\par \par RTC in 3 weeks\par \par

## 2021-07-27 ENCOUNTER — APPOINTMENT (OUTPATIENT)
Dept: INFUSION THERAPY | Facility: CLINIC | Age: 56
End: 2021-07-27
Payer: COMMERCIAL

## 2021-07-27 ENCOUNTER — OUTPATIENT (OUTPATIENT)
Dept: OUTPATIENT SERVICES | Facility: HOSPITAL | Age: 56
LOS: 1 days | Discharge: HOME | End: 2021-07-27

## 2021-07-27 ENCOUNTER — LABORATORY RESULT (OUTPATIENT)
Age: 56
End: 2021-07-27

## 2021-07-27 ENCOUNTER — APPOINTMENT (OUTPATIENT)
Dept: HEMATOLOGY ONCOLOGY | Facility: CLINIC | Age: 56
End: 2021-07-27
Payer: COMMERCIAL

## 2021-07-27 VITALS
HEIGHT: 60 IN | HEART RATE: 55 BPM | BODY MASS INDEX: 25.52 KG/M2 | SYSTOLIC BLOOD PRESSURE: 122 MMHG | WEIGHT: 130 LBS | DIASTOLIC BLOOD PRESSURE: 65 MMHG | TEMPERATURE: 98.3 F

## 2021-07-27 DIAGNOSIS — Z51.12 ENCOUNTER FOR ANTINEOPLASTIC IMMUNOTHERAPY: ICD-10-CM

## 2021-07-27 DIAGNOSIS — C54.1 MALIGNANT NEOPLASM OF ENDOMETRIUM: ICD-10-CM

## 2021-07-27 LAB
HCT VFR BLD CALC: 39.5 %
HGB BLD-MCNC: 13.6 G/DL
MCHC RBC-ENTMCNC: 29 PG
MCHC RBC-ENTMCNC: 34.4 G/DL
MCV RBC AUTO: 84.2 FL
PLATELET # BLD AUTO: 126 K/UL
PMV BLD: 11.6 FL
RBC # BLD: 4.69 M/UL
RBC # FLD: 14.3 %
WBC # FLD AUTO: 9.06 K/UL

## 2021-07-27 PROCEDURE — 99215 OFFICE O/P EST HI 40 MIN: CPT

## 2021-07-27 RX ORDER — PEMBROLIZUMAB 25 MG/ML
200 INJECTION, SOLUTION INTRAVENOUS ONCE
Refills: 0 | Status: COMPLETED | OUTPATIENT
Start: 2021-07-27 | End: 2021-07-27

## 2021-07-27 RX ADMIN — PEMBROLIZUMAB 216 MILLIGRAM(S): 25 INJECTION, SOLUTION INTRAVENOUS at 15:58

## 2021-07-27 RX ADMIN — PEMBROLIZUMAB 200 MILLIGRAM(S): 25 INJECTION, SOLUTION INTRAVENOUS at 16:30

## 2021-07-29 LAB
ALBUMIN SERPL ELPH-MCNC: 5 G/DL
ALP BLD-CCNC: 77 U/L
ALT SERPL-CCNC: 18 U/L
ANION GAP SERPL CALC-SCNC: 14 MMOL/L
AST SERPL-CCNC: 19 U/L
BILIRUB SERPL-MCNC: 0.3 MG/DL
BUN SERPL-MCNC: 9 MG/DL
CALCIUM SERPL-MCNC: 10.2 MG/DL
CANCER AG125 SERPL-ACNC: 10 U/ML
CHLORIDE SERPL-SCNC: 103 MMOL/L
CO2 SERPL-SCNC: 24 MMOL/L
CREAT SERPL-MCNC: 0.5 MG/DL
GLUCOSE SERPL-MCNC: 64 MG/DL
POTASSIUM SERPL-SCNC: 4.7 MMOL/L
PROT SERPL-MCNC: 8.5 G/DL
SODIUM SERPL-SCNC: 141 MMOL/L
T4 SERPL-MCNC: 7.3 UG/DL
TSH SERPL-ACNC: 1.54 UIU/ML

## 2021-07-31 NOTE — CONSULT LETTER
[Dear  ___] : Dear  [unfilled], [Courtesy Letter:] : I had the pleasure of seeing your patient, [unfilled], in my office today. [Please see my note below.] : Please see my note below. [Consult Closing:] : Thank you very much for allowing me to participate in the care of this patient.  If you have any questions, please do not hesitate to contact me. [Sincerely,] : Sincerely, [DrShabbir  ___] : Dr. THAKUR [FreeTextEntry2] : Dr. ELLYN Campuzano [FreeTextEntry3] : Demetrice Willard MD\par Professor [employer]\par Adam/Dorian\par Attending Physician\par [facility] [place]\par [phone]\par \par

## 2021-07-31 NOTE — PHYSICAL EXAM
[Fully active, able to carry on all pre-disease performance without restriction] : Status 0 - Fully active, able to carry on all pre-disease performance without restriction [Normal] : affect appropriate [de-identified] : Healed laparotomy scars, no tenderness

## 2021-07-31 NOTE — ASSESSMENT
[FreeTextEntry1] : This is a 55 year old female with PMH of DM II,  diagnosed with FIGO IVB well to moderately differentiated endometrioid cancer with focal squamous differentiation and focal clear cell changes.  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 FIGO IVB endometrial cancer\par - s/p 5 cycles of  carboplatin (AUC 6) and taxol (175 mg/m2) every 3 weeks. \par - Was switched from Carboplatin/Taxol to Keytruda.  NGS testing sent and reviewed.\par \par Although the patient had a stage IV disease at diagnosis, with neoadjuvant chemo, she got downstaged.\par Had  d/w Rad/Onc if there is any benefit for RT: no role for RT\par \par  Pt had surgery on 3/8/21. Pathology report d/w pt and her family. Seems like she had a good response to neoadjuvant treatment. Reviewed the operative report, there was no gross residual disease left post surgery.\par 3/5/21 left pelvic Lns were positive for Ca, Pathologic stage pT1N1a Stage IIIC1\par \par PLAN:\par Will continue cycle # 21 of Keytruda  mg once every 3 weeks until disease progression or unacceptable toxicity. CBC is adequate.  has been stable\par \par Kidney function and liver enzymes are stable. \par - Side effects discussed including: Peripheral edema, cardiac arrhythmia, Fatigue, Pruritus, skin rash, Hyperglycemia, hypoalbuminemia, hypertriglyceridemia , hyponatremia , hypophosphatemia, hypocalcemia, decreased serum bicarbonate , hypercholesterolemia hypokalemia, hypoglycemia, hypercalcemia, hypothyroidism , hyperkalemia, Diarrhea , decreased appetite, constipation , abdominal pain , nausea, vomiting hematuria, anemia, lymphocytopenia, leukopenia, neutropenia, thrombocytopenia, hemorrhage, increased INR, prolonged partial thromboplastin time, abnormal liver enzymes.\par \par MONITORING as follows\par Liver enzymes at baseline and periodically during treatment; renal function; thyroid function (at baseline, periodically during treatment and as clinically indicated); glucose; CBC with differential;\par signs/symptoms of colitis, dermatologic toxicity, hypophysitis, thyroid disorders, pneumonitis and infusion reactions.\par \par The immunotherapy dose was adjusted according to pt's height, weight, labs and anticipated tolerance.\par The high risk of complications and complexity associated with antineoplastic therapy administration has been explained to the pt.\par Treatment will be administered under my direct supervision\par \par Labs ordered today includes CMP, TSH and Ca 125\par \par RTC in 3 weeks\par \par

## 2021-07-31 NOTE — REVIEW OF SYSTEMS
[Negative] : Heme/Lymph [Fatigue] : no fatigue [Vaginal Discharge] : no vaginal discharge [de-identified] : Mild numbness in fingers and feet

## 2021-07-31 NOTE — HISTORY OF PRESENT ILLNESS
[de-identified] : 54 yof, PMH of DM II, presented to ED in 10/2019 after experiencing worsening of chronic, intermittent pelvic pain and vaginal bleeding.  Patient underwent transvaginal ultrasound, which showed 3.5 x 2.7 x 4.4 cm cervical mass.  CT abdomen/pelvis revealed R iliac chain Ln 1.5 x 1.4 cm, along with 2.2 x 1.8 cm portacaval LNs, and enlargement of uterus.  Endometrial biopsy revealed well to moderately differentiated endometrioid cancer with focal squamous differentiation and focal clear cell changes.  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   CT chest negative for evidence of intrathoracic metastases.  Patient was initially scheduled for ALESHA/BSO on 11/25/2019.  However, MRI pelvis revealed L adnexal peritoneal tumor implants, enlarged pelvic and retroperitoneal lymph nodes, FIGO stage IVB.  ALESHA/BSO was subsequently canceled.  Patient recently saw Dr. Perez, who recommended chemotherapy alone.  Patient is currently only having occasional, mild vaginal bleeding and intermittent pelvic pain, which responds to tylenol.\par \par Family history is significant for history of uterine cancer in paternal grandmother (per patient, diagnosed at around 55 years) and "blood cancer" in maternal grandmother.\par \par Patient lives with her family and is fully functional at baseline.  She used to work as a  but is now no longer working.  She is also is in the process of applying for Medicaid. [de-identified] : Microsatellite status MSI-High §\par Tumor Mutational [place] 42 Muts/Mb §\par ARID1A P427fs*6\par ARID1A P6643ap*15\par ATRX I127fs*7\par CASP8 splice site 305+1G>A\par CCND1 P287A\par CDC73 E325*\par CREBBP A146fs*6\par CTCF T204fs*26\par FGFR3 V630M\par FLCN H429fs*39\par JAK1 K860fs*16\par KRAS G12D\par MET K885uns\par MSH3 K383fs*32\par NFE2L2 E82D\par PDGFRB R919W\par PIK3CA R38C\par PIK3R1 H659zdu\par PTEN V166fs*14\par RNF43 G659fs*41\par TP53 D7H  [de-identified] : 1/20/20 : The patient is here for follow up .She received 2 doses of ferraheme prior to chemo . She tolerated carbo, taxol with mild symptoms of nausea and vomiting. She lost her hair , and reported mild numbness of fingers. She reported also minimal vaginal bleed. Her abdominal pain resolved and she is able to ambulate with no problems\par \par 2/10/2020: The patient is here for follow up , she tolerated her second cycle of chemotherapy with no problems , except for mild nausea and vomiting for the first few days. She also reported some mucousy discharge from vagina. She is eating well , no abdominal pain. \par \par 3/2/2020: The patient is here for follow up . She is tolerating chemotherapy with no major side effects except for nausea in the following days.  She had a CT C/A/P after her 3rd cycle which showed mixed response. She denies any vaginal bleed, no rectal pain on defecation, no abdominal pain. \par \par \par \par 3/23/2020: The patient is here for follow up. She is tolerating chemotherapy with no major side effects . She is reporting nausea in the following days after chemotherapy. She denies fever, chills, abdominal pain , no constipation or diarrhea . No vaginal bleeding\par \par 04/07/2020\par JAY BOWMAN a 54 year F is here today for follow up of FIGO IVB endometrial cancer.\par Had  Ronnie number [id number].\par S/p 4 cycles of carbo and taxol, tolerating well. Cancelled last chemo appt due to corona virus. \par Reports lower abdominal cramping 2/10 started today. Denies vaginal bleeding today. Did have spotting x 1 since last treatment.\par Denies NVD, fever, chills. \par Pt reports peripheral neuropathy in fingers b/l not bothersome \par Had GYN f/up 3 weeks ago. \par CBC reviewed: WBC 8.6, h/H 12.8/36.9%, , Neutrophils 5.59\par \par 4/28/20: \par History obtained through  Manish from [employer]. ID# [id number]\par Pt returns for a f/u vist. She is S/P 1st cycle of keytruda. She tolerated it fairly well. Abdominal pain has resolved. She continues to have mild yellowish vaginal discharge. No vaginal bleeding.\par \par 05/19/2020\par JAY BOWMAN a 54 year F is here today with son for follow up of FIGO IVB endometrial cancer. \par Pt prefers her son translates today's visit. \par Pt denies abdominal pain, vaginal bleeding, NVD, fever, chills. \par She is s/p 2nd cycle of Keytruda, tolerated well. \par  : 31\par \par 6/9/20\par Pt is here for follow up.\par No new complaints.\par Feels well. Denies any abd pain, nausea, vomiting, rash or fever.\par Has mild vag spotting\par \par 06/30/2020\par JAY BOWMAN a 54 year F is here today for follow up of FIGO IVB endometrial cancer. \par S/p 4 cycles of Keytruda.\par She feels well. Denies any new complaints.\par Denies fever, chills, SOB, vaginal bleeding, abdominal pain, distention. \par Last : 29\par CBC reviewed: H/H 13.5/39.2%.\par \par 7/21/2020\par 53 yo female accompanied by her son for follow up visit for endometrial cancer and evaluation prior to Keytruda #6.  She offers no new complaints.  \par Denies any fever, chills, SOB, vaginal bleeding, abdominal pain or distention.  Last : 35/39/68.\par CBC shows wbc=9.11, Hgb=13.8, ajk=260, ANC=4.89.  H=1.39 on 6/30/2020. She was COVID tested 7/17/2020 not detected.\par \par 8/11/20\par Pt is here for follow up.\par No complaints of abd pain, N, v, D or vaginal bleeding.\par Has mild tingling of feet and finger tips.\par No fever.\par \par 9/1/20\par Pt is here for follow up.\par Tolerating Keytruda well.\par Patient denies abdominal pain or bloating, denies vaginal bleeding or discharge.\par Patient denies shortness of breath, denies fever, denies bone pain.\par Has occasional vaginal discomfort.\par \par 9/22/20\par Pt is here  for follow up.\par No new complaints, Denies abdominal pain, N, V, d\par No vag bleeding\par No SOB.\par \par 10/13/20\par Pt is here for follow up.\par No vag bleeding, N, V, D, abd pain.\par No side effects from immunotherapy\par \par 11/3/20\par Pt is here for follow up.\par No new complaints.\par No N, V, D, Abd pain.\par No vag bleeding.\par Tolerating immunotherapy well.\par \par 11/24/2020\par Patient is here today for follow up visit for endometrial cancer.  She is tolerating immunotherapy-  Keytruda well.  She offers no new complaints except for mild paresthesias.  Denies any N/V/D/C or vaginal bleeding.  Last =28.\par \par 12/15/20\par Pt is here for follow up visit.\par No complaints.\par Feels well, no abd pain, n. V, diarrhea.\par No vag bleeding.\par has not made appt for Ct scans yet\par \par 1/5/2021\par Pt is her to f/u for endometrial cancer\par No complaints of abdominal pain, or vaginal bleeding\par Feels well with good appetite\par Pt requesting  flu vaccine\par \par 1/26/21\par Pt is here for follow up. Feels well.\par Saw GYN and was offered interval debulking scheduled for 2/15\par No vag bleeding\par \par 2/16/21\par Pt is here for follow up.\par Her surgery has been rescheduled to 3/1/21\par Feels well, no abd pain, N, V, D, vag bleeding\par States her sugar levels have been running high. She will be seeing her PCP\par \par 3/23/21\par Pt is here for follow up. She is s/p surgery 08-Mar-2021 .\par She underwent Peritoneal biopsy \par Pelvic and para-aortic lymphadenectomy\par \par Robot-assisted omentectomy\par Hysterectomy, total,  with salpingo-oophorectomy \par Operative Findings:\par - Operative Findings EUA: Uterus 14w size, normal external genitalia, no gross\par lesions.\par Laparoscopy: Uterus, cervix bilateral fallopian tubes and ovaries grossly\par normal. Anterior abdominal wall nodularity likely cancer responding to\par treatment. No gross tumor visualized, upper abdomen without gross diease.\par Bilateral enlarged bulky pelvic LN 2-3cm in size.\par \par Pathology as noted below\par \par Pt is recovering well from surgery. Has Nl Bowel habits. No post op complications.\par Has been having high bld sugar, will talk to PCP\par \par 4/13/21\par pt is here for follow up and treatment. feels well.No abd pain, N, V, d\par No vag bleeding\par \par 5/7/21\par Pt is here for a follow up.\par Feels well. No new complaints.\par No abd pain, rash, vag bleeding\par No N, V, D, cough.\par \par 5/25/2021\par Pt is here to f/u for endometrial cancer.\par She is s/p Ketruda cycle #17, tolerating well.\par She feels well today, appetite is good.\par She denies abdominal pain, nausea/vomiting, or diarrhea.\par She c/o of mild tingling of finger and feet.\par \par 6/15/21\par Pt  is here for folow up.\par No abd pain, N, V, d, constipation.\par No vag bleeding\par \par 7/6/21\par Pt is here for follow up.\par Feels well. No new symptoms.\par No abdominal pain, N, V, D, vag bleeding.\par 7/27/21\par Pt is here for follow up.\par Feels well.\par No abd pain , N, V, D, vag bleeding

## 2021-08-17 ENCOUNTER — APPOINTMENT (OUTPATIENT)
Dept: HEMATOLOGY ONCOLOGY | Facility: CLINIC | Age: 56
End: 2021-08-17
Payer: COMMERCIAL

## 2021-08-17 ENCOUNTER — LABORATORY RESULT (OUTPATIENT)
Age: 56
End: 2021-08-17

## 2021-08-17 ENCOUNTER — APPOINTMENT (OUTPATIENT)
Dept: INFUSION THERAPY | Facility: CLINIC | Age: 56
End: 2021-08-17
Payer: COMMERCIAL

## 2021-08-17 VITALS
HEIGHT: 60 IN | SYSTOLIC BLOOD PRESSURE: 107 MMHG | WEIGHT: 130 LBS | HEART RATE: 63 BPM | BODY MASS INDEX: 25.52 KG/M2 | TEMPERATURE: 98.1 F | DIASTOLIC BLOOD PRESSURE: 67 MMHG

## 2021-08-17 LAB
HCT VFR BLD CALC: 39.7 %
HGB BLD-MCNC: 13.8 G/DL
MCHC RBC-ENTMCNC: 29.4 PG
MCHC RBC-ENTMCNC: 34.8 G/DL
MCV RBC AUTO: 84.5 FL
PLATELET # BLD AUTO: 196 K/UL
PMV BLD: 11.7 FL
RBC # BLD: 4.7 M/UL
RBC # FLD: 14.1 %
WBC # FLD AUTO: 9.66 K/UL

## 2021-08-17 PROCEDURE — 99215 OFFICE O/P EST HI 40 MIN: CPT

## 2021-08-17 RX ORDER — PEMBROLIZUMAB 25 MG/ML
200 INJECTION, SOLUTION INTRAVENOUS ONCE
Refills: 0 | Status: COMPLETED | OUTPATIENT
Start: 2021-08-17 | End: 2021-08-17

## 2021-08-17 RX ADMIN — PEMBROLIZUMAB 216 MILLIGRAM(S): 25 INJECTION, SOLUTION INTRAVENOUS at 14:55

## 2021-08-17 RX ADMIN — PEMBROLIZUMAB 200 MILLIGRAM(S): 25 INJECTION, SOLUTION INTRAVENOUS at 15:25

## 2021-08-18 LAB
ALBUMIN SERPL ELPH-MCNC: 4.4 G/DL
ALP BLD-CCNC: 58 U/L
ALT SERPL-CCNC: 15 U/L
ANION GAP SERPL CALC-SCNC: 13 MMOL/L
AST SERPL-CCNC: 33 U/L
BILIRUB SERPL-MCNC: 0.3 MG/DL
BUN SERPL-MCNC: 12 MG/DL
CALCIUM SERPL-MCNC: 9.7 MG/DL
CEA SERPL-MCNC: 1.5 NG/ML
CHLORIDE SERPL-SCNC: 96 MMOL/L
CO2 SERPL-SCNC: 21 MMOL/L
CREAT SERPL-MCNC: 0.6 MG/DL
GLUCOSE SERPL-MCNC: 222 MG/DL
POTASSIUM SERPL-SCNC: NORMAL MMOL/L
PROT SERPL-MCNC: 7.9 G/DL
SODIUM SERPL-SCNC: 130 MMOL/L
T4 FREE SERPL-MCNC: 1.3 NG/DL
TSH SERPL-ACNC: 1.68 UIU/ML

## 2021-08-23 NOTE — PHYSICAL EXAM
[Fully active, able to carry on all pre-disease performance without restriction] : Status 0 - Fully active, able to carry on all pre-disease performance without restriction [Normal] : affect appropriate [de-identified] : Healed laparotomy scars, no tenderness

## 2021-08-23 NOTE — HISTORY OF PRESENT ILLNESS
[de-identified] : 54 yof, PMH of DM II, presented to ED in 10/2019 after experiencing worsening of chronic, intermittent pelvic pain and vaginal bleeding.  Patient underwent transvaginal ultrasound, which showed 3.5 x 2.7 x 4.4 cm cervical mass.  CT abdomen/pelvis revealed R iliac chain Ln 1.5 x 1.4 cm, along with 2.2 x 1.8 cm portacaval LNs, and enlargement of uterus.  Endometrial biopsy revealed well to moderately differentiated endometrioid cancer with focal squamous differentiation and focal clear cell changes.  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   CT chest negative for evidence of intrathoracic metastases.  Patient was initially scheduled for ALESHA/BSO on 11/25/2019.  However, MRI pelvis revealed L adnexal peritoneal tumor implants, enlarged pelvic and retroperitoneal lymph nodes, FIGO stage IVB.  ALESHA/BSO was subsequently canceled.  Patient recently saw Dr. Perez, who recommended chemotherapy alone.  Patient is currently only having occasional, mild vaginal bleeding and intermittent pelvic pain, which responds to tylenol.\par \par Family history is significant for history of uterine cancer in paternal grandmother (per patient, diagnosed at around 55 years) and "blood cancer" in maternal grandmother.\par \par Patient lives with her family and is fully functional at baseline.  She used to work as a  but is now no longer working.  She is also is in the process of applying for Medicaid. [de-identified] : Microsatellite status MSI-High §\par Tumor Mutational [place] 42 Muts/Mb §\par ARID1A P427fs*6\par ARID1A F4062jd*15\par ATRX I127fs*7\par CASP8 splice site 305+1G>A\par CCND1 P287A\par CDC73 E325*\par CREBBP A146fs*6\par CTCF T204fs*26\par FGFR3 V630M\par FLCN H429fs*39\par JAK1 K860fs*16\par KRAS G12D\par MET E636eko\par MSH3 K383fs*32\par NFE2L2 E82D\par PDGFRB R919W\par PIK3CA R38C\par PIK3R1 R348bvn\par PTEN V166fs*14\par RNF43 G659fs*41\par TP53 D7H  [de-identified] : 1/20/20 : The patient is here for follow up .She received 2 doses of ferraheme prior to chemo . She tolerated carbo, taxol with mild symptoms of nausea and vomiting. She lost her hair , and reported mild numbness of fingers. She reported also minimal vaginal bleed. Her abdominal pain resolved and she is able to ambulate with no problems\par \par 2/10/2020: The patient is here for follow up , she tolerated her second cycle of chemotherapy with no problems , except for mild nausea and vomiting for the first few days. She also reported some mucousy discharge from vagina. She is eating well , no abdominal pain. \par \par 3/2/2020: The patient is here for follow up . She is tolerating chemotherapy with no major side effects except for nausea in the following days.  She had a CT C/A/P after her 3rd cycle which showed mixed response. She denies any vaginal bleed, no rectal pain on defecation, no abdominal pain. \par \par \par \par 3/23/2020: The patient is here for follow up. She is tolerating chemotherapy with no major side effects . She is reporting nausea in the following days after chemotherapy. She denies fever, chills, abdominal pain , no constipation or diarrhea . No vaginal bleeding\par \par 04/07/2020\par JAY BOWMAN a 54 year F is here today for follow up of FIGO IVB endometrial cancer.\par Had  Ronnie number [id number].\par S/p 4 cycles of carbo and taxol, tolerating well. Cancelled last chemo appt due to corona virus. \par Reports lower abdominal cramping 2/10 started today. Denies vaginal bleeding today. Did have spotting x 1 since last treatment.\par Denies NVD, fever, chills. \par Pt reports peripheral neuropathy in fingers b/l not bothersome \par Had GYN f/up 3 weeks ago. \par CBC reviewed: WBC 8.6, h/H 12.8/36.9%, , Neutrophils 5.59\par \par 4/28/20: \par History obtained through  Manish from [employer]. ID# [id number]\par Pt returns for a f/u vist. She is S/P 1st cycle of keytruda. She tolerated it fairly well. Abdominal pain has resolved. She continues to have mild yellowish vaginal discharge. No vaginal bleeding.\par \par 05/19/2020\par JAY BOWMAN a 54 year F is here today with son for follow up of FIGO IVB endometrial cancer. \par Pt prefers her son translates today's visit. \par Pt denies abdominal pain, vaginal bleeding, NVD, fever, chills. \par She is s/p 2nd cycle of Keytruda, tolerated well. \par  : 31\par \par 6/9/20\par Pt is here for follow up.\par No new complaints.\par Feels well. Denies any abd pain, nausea, vomiting, rash or fever.\par Has mild vag spotting\par \par 06/30/2020\par JAY BOWMAN a 54 year F is here today for follow up of FIGO IVB endometrial cancer. \par S/p 4 cycles of Keytruda.\par She feels well. Denies any new complaints.\par Denies fever, chills, SOB, vaginal bleeding, abdominal pain, distention. \par Last : 29\par CBC reviewed: H/H 13.5/39.2%.\par \par 7/21/2020\par 53 yo female accompanied by her son for follow up visit for endometrial cancer and evaluation prior to Keytruda #6.  She offers no new complaints.  \par Denies any fever, chills, SOB, vaginal bleeding, abdominal pain or distention.  Last : 35/39/68.\par CBC shows wbc=9.11, Hgb=13.8, riq=962, ANC=4.89.  H=1.39 on 6/30/2020. She was COVID tested 7/17/2020 not detected.\par \par 8/11/20\par Pt is here for follow up.\par No complaints of abd pain, N, v, D or vaginal bleeding.\par Has mild tingling of feet and finger tips.\par No fever.\par \par 9/1/20\par Pt is here for follow up.\par Tolerating Keytruda well.\par Patient denies abdominal pain or bloating, denies vaginal bleeding or discharge.\par Patient denies shortness of breath, denies fever, denies bone pain.\par Has occasional vaginal discomfort.\par \par 9/22/20\par Pt is here  for follow up.\par No new complaints, Denies abdominal pain, N, V, d\par No vag bleeding\par No SOB.\par \par 10/13/20\par Pt is here for follow up.\par No vag bleeding, N, V, D, abd pain.\par No side effects from immunotherapy\par \par 11/3/20\par Pt is here for follow up.\par No new complaints.\par No N, V, D, Abd pain.\par No vag bleeding.\par Tolerating immunotherapy well.\par \par 11/24/2020\par Patient is here today for follow up visit for endometrial cancer.  She is tolerating immunotherapy-  Keytruda well.  She offers no new complaints except for mild paresthesias.  Denies any N/V/D/C or vaginal bleeding.  Last =28.\par \par 12/15/20\par Pt is here for follow up visit.\par No complaints.\par Feels well, no abd pain, n. V, diarrhea.\par No vag bleeding.\par has not made appt for Ct scans yet\par \par 1/5/2021\par Pt is her to f/u for endometrial cancer\par No complaints of abdominal pain, or vaginal bleeding\par Feels well with good appetite\par Pt requesting  flu vaccine\par \par 1/26/21\par Pt is here for follow up. Feels well.\par Saw GYN and was offered interval debulking scheduled for 2/15\par No vag bleeding\par \par 2/16/21\par Pt is here for follow up.\par Her surgery has been rescheduled to 3/1/21\par Feels well, no abd pain, N, V, D, vag bleeding\par States her sugar levels have been running high. She will be seeing her PCP\par \par 3/23/21\par Pt is here for follow up. She is s/p surgery 08-Mar-2021 .\par She underwent Peritoneal biopsy \par Pelvic and para-aortic lymphadenectomy\par \par Robot-assisted omentectomy\par Hysterectomy, total,  with salpingo-oophorectomy \par Operative Findings:\par - Operative Findings EUA: Uterus 14w size, normal external genitalia, no gross\par lesions.\par Laparoscopy: Uterus, cervix bilateral fallopian tubes and ovaries grossly\par normal. Anterior abdominal wall nodularity likely cancer responding to\par treatment. No gross tumor visualized, upper abdomen without gross diease.\par Bilateral enlarged bulky pelvic LN 2-3cm in size.\par \par Pathology as noted below\par \par Pt is recovering well from surgery. Has Nl Bowel habits. No post op complications.\par Has been having high bld sugar, will talk to PCP\par \par 4/13/21\par pt is here for follow up and treatment. feels well.No abd pain, N, V, d\par No vag bleeding\par \par 5/7/21\par Pt is here for a follow up.\par Feels well. No new complaints.\par No abd pain, rash, vag bleeding\par No N, V, D, cough.\par \par 5/25/2021\par Pt is here to f/u for endometrial cancer.\par She is s/p Ketruda cycle #17, tolerating well.\par She feels well today, appetite is good.\par She denies abdominal pain, nausea/vomiting, or diarrhea.\par She c/o of mild tingling of finger and feet.\par \par 6/15/21\par Pt  is here for folow up.\par No abd pain, N, V, d, constipation.\par No vag bleeding\par \par 7/6/21\par Pt is here for follow up.\par Feels well. No new symptoms.\par No abdominal pain, N, V, D, vag bleeding.\par 7/27/21\par Pt is here for follow up.\par Feels well.\par No abd pain , N, V, D, vag bleeding\par \par 8/17/21\par Pt is feeling well.No abd pain, N, V, D

## 2021-08-23 NOTE — REVIEW OF SYSTEMS
[Negative] : Heme/Lymph [Fatigue] : no fatigue [Vaginal Discharge] : no vaginal discharge [de-identified] : Mild numbness in fingers and feet

## 2021-08-23 NOTE — CONSULT LETTER
[Dear  ___] : Dear  [unfilled], [Courtesy Letter:] : I had the pleasure of seeing your patient, [unfilled], in my office today. [Please see my note below.] : Please see my note below. [Consult Closing:] : Thank you very much for allowing me to participate in the care of this patient.  If you have any questions, please do not hesitate to contact me. [Sincerely,] : Sincerely, [DrShabbir  ___] : Dr. THAKUR [FreeTextEntry2] : Dr. ELLYN Campuzano [FreeTextEntry3] : Demetrice Willard MD\par Professor [employer]\par Adam/Dorian\par Attending Physician\par [facility]\par [phone]\par \par

## 2021-08-23 NOTE — ASSESSMENT
[FreeTextEntry1] : This is a 55 year old female with PMH of DM II,  diagnosed with FIGO IVB well to moderately differentiated endometrioid cancer with focal squamous differentiation and focal clear cell changes.  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 FIGO IVB endometrial cancer\par - s/p 5 cycles of  carboplatin (AUC 6) and taxol (175 mg/m2) every 3 weeks. \par - Was switched from Carboplatin/Taxol to Keytruda.  NGS testing sent and reviewed.\par \par Although the patient had a stage IV disease at diagnosis, with neoadjuvant chemo, she got downstaged.\par Had  d/w Rad/Onc if there is any benefit for RT: no role for RT\par \par  Pt had surgery on 3/8/21. Pathology report d/w pt and her family. Seems like she had a good response to neoadjuvant treatment. Reviewed the operative report, there was no gross residual disease left post surgery.\par 3/5/21 left pelvic Lns were positive for Ca, Pathologic stage pT1N1a Stage IIIC1\par \par PLAN:\par Will continue cycle # 22 of Keytruda  mg once every 3 weeks until disease progression or unacceptable toxicity. CBC is adequate.  has been stable\par \par Kidney function and liver enzymes are stable. \par - Side effects discussed including: Peripheral edema, cardiac arrhythmia, Fatigue, Pruritus, skin rash, Hyperglycemia, hypoalbuminemia, hypertriglyceridemia , hyponatremia , hypophosphatemia, hypocalcemia, decreased serum bicarbonate , hypercholesterolemia hypokalemia, hypoglycemia, hypercalcemia, hypothyroidism , hyperkalemia, Diarrhea , decreased appetite, constipation , abdominal pain , nausea, vomiting hematuria, anemia, lymphocytopenia, leukopenia, neutropenia, thrombocytopenia, hemorrhage, increased INR, prolonged partial thromboplastin time, abnormal liver enzymes.\par \par MONITORING as follows\par Liver enzymes at baseline and periodically during treatment; renal function; thyroid function (at baseline, periodically during treatment and as clinically indicated); glucose; CBC with differential;\par signs/symptoms of colitis, dermatologic toxicity, hypophysitis, thyroid disorders, pneumonitis and infusion reactions.\par \par The immunotherapy dose was adjusted according to pt's height, weight, labs and anticipated tolerance.\par The high risk of complications and complexity associated with antineoplastic therapy administration has been explained to the pt.\par Treatment will be administered under my direct supervision\par \par Labs ordered today includes CMP, TSH and Ca 125\par imaging ordered\par \par RTC in 3 weeks\par \par

## 2021-09-07 ENCOUNTER — APPOINTMENT (OUTPATIENT)
Dept: INFUSION THERAPY | Facility: CLINIC | Age: 56
End: 2021-09-07
Payer: COMMERCIAL

## 2021-09-07 ENCOUNTER — APPOINTMENT (OUTPATIENT)
Dept: HEMATOLOGY ONCOLOGY | Facility: CLINIC | Age: 56
End: 2021-09-07
Payer: COMMERCIAL

## 2021-09-07 ENCOUNTER — LABORATORY RESULT (OUTPATIENT)
Age: 56
End: 2021-09-07

## 2021-09-07 VITALS
DIASTOLIC BLOOD PRESSURE: 79 MMHG | HEART RATE: 61 BPM | HEIGHT: 60 IN | RESPIRATION RATE: 14 BRPM | BODY MASS INDEX: 25.32 KG/M2 | SYSTOLIC BLOOD PRESSURE: 103 MMHG | TEMPERATURE: 97.8 F | WEIGHT: 129 LBS

## 2021-09-07 LAB
HCT VFR BLD CALC: 38.3 %
HGB BLD-MCNC: 13.2 G/DL
MCHC RBC-ENTMCNC: 29.3 PG
MCHC RBC-ENTMCNC: 34.5 G/DL
MCV RBC AUTO: 84.9 FL
PLATELET # BLD AUTO: 225 K/UL
PMV BLD: 11 FL
RBC # BLD: 4.51 M/UL
RBC # FLD: 14 %
WBC # FLD AUTO: 9.78 K/UL

## 2021-09-07 PROCEDURE — 99215 OFFICE O/P EST HI 40 MIN: CPT

## 2021-09-07 RX ORDER — PEMBROLIZUMAB 25 MG/ML
200 INJECTION, SOLUTION INTRAVENOUS ONCE
Refills: 0 | Status: COMPLETED | OUTPATIENT
Start: 2021-09-07 | End: 2021-09-07

## 2021-09-07 RX ADMIN — PEMBROLIZUMAB 200 MILLIGRAM(S): 25 INJECTION, SOLUTION INTRAVENOUS at 16:15

## 2021-09-07 RX ADMIN — PEMBROLIZUMAB 216 MILLIGRAM(S): 25 INJECTION, SOLUTION INTRAVENOUS at 15:40

## 2021-09-07 NOTE — PHYSICAL EXAM
[Fully active, able to carry on all pre-disease performance without restriction] : Status 0 - Fully active, able to carry on all pre-disease performance without restriction [Normal] : affect appropriate [de-identified] : Healed laparotomy scars, no tenderness

## 2021-09-07 NOTE — HISTORY OF PRESENT ILLNESS
[de-identified] : 54 yof, PMH of DM II, presented to ED in 10/2019 after experiencing worsening of chronic, intermittent pelvic pain and vaginal bleeding.  Patient underwent transvaginal ultrasound, which showed 3.5 x 2.7 x 4.4 cm cervical mass.  CT abdomen/pelvis revealed R iliac chain Ln 1.5 x 1.4 cm, along with 2.2 x 1.8 cm portacaval LNs, and enlargement of uterus.  Endometrial biopsy revealed well to moderately differentiated endometrioid cancer with focal squamous differentiation and focal clear cell changes.  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   CT chest negative for evidence of intrathoracic metastases.  Patient was initially scheduled for ALESHA/BSO on 11/25/2019.  However, MRI pelvis revealed L adnexal peritoneal tumor implants, enlarged pelvic and retroperitoneal lymph nodes, FIGO stage IVB.  ALESHA/BSO was subsequently canceled.  Patient recently saw Dr. Perez, who recommended chemotherapy alone.  Patient is currently only having occasional, mild vaginal bleeding and intermittent pelvic pain, which responds to tylenol.\par \par Family history is significant for history of uterine cancer in paternal grandmother (per patient, diagnosed at around 55 years) and "blood cancer" in maternal grandmother.\par \par Patient lives with her family and is fully functional at baseline.  She used to work as a  but is now no longer working.  She is also is in the process of applying for Medicaid. [de-identified] : Microsatellite status MSI-High §\par Tumor Mutational [place] 42 Muts/Mb §\par ARID1A P427fs*6\par ARID1A K9101sj*15\par ATRX I127fs*7\par CASP8 splice site 305+1G>A\par CCND1 P287A\par CDC73 E325*\par CREBBP A146fs*6\par CTCF T204fs*26\par FGFR3 V630M\par FLCN H429fs*39\par JAK1 K860fs*16\par KRAS G12D\par MET S154amt\par MSH3 K383fs*32\par NFE2L2 E82D\par PDGFRB R919W\par PIK3CA R38C\par PIK3R1 J126hog\par PTEN V166fs*14\par RNF43 G659fs*41\par TP53 D7H  [de-identified] : 1/20/20 : The patient is here for follow up .She received 2 doses of ferraheme prior to chemo . She tolerated carbo, taxol with mild symptoms of nausea and vomiting. She lost her hair , and reported mild numbness of fingers. She reported also minimal vaginal bleed. Her abdominal pain resolved and she is able to ambulate with no problems\par \par 2/10/2020: The patient is here for follow up , she tolerated her second cycle of chemotherapy with no problems , except for mild nausea and vomiting for the first few days. She also reported some mucousy discharge from vagina. She is eating well , no abdominal pain. \par \par 3/2/2020: The patient is here for follow up . She is tolerating chemotherapy with no major side effects except for nausea in the following days.  She had a CT C/A/P after her 3rd cycle which showed mixed response. She denies any vaginal bleed, no rectal pain on defecation, no abdominal pain. \par \par \par \par 3/23/2020: The patient is here for follow up. She is tolerating chemotherapy with no major side effects . She is reporting nausea in the following days after chemotherapy. She denies fever, chills, abdominal pain , no constipation or diarrhea . No vaginal bleeding\par \par 04/07/2020\par JAY BOWMAN a 54 year F is here today for follow up of FIGO IVB endometrial cancer.\par Had  Ronnie number [id number].\par S/p 4 cycles of carbo and taxol, tolerating well. Cancelled last chemo appt due to corona virus. \par Reports lower abdominal cramping 2/10 started today. Denies vaginal bleeding today. Did have spotting x 1 since last treatment.\par Denies NVD, fever, chills. \par Pt reports peripheral neuropathy in fingers b/l not bothersome \par Had GYN f/up 3 weeks ago. \par CBC reviewed: WBC 8.6, h/H 12.8/36.9%, , Neutrophils 5.59\par \par 4/28/20: \par History obtained through  [facility] from [employer]. ID# [id number]\par Pt returns for a f/u vist. She is S/P 1st cycle of keytruda. She tolerated it fairly well. Abdominal pain has resolved. She continues to have mild yellowish vaginal discharge. No vaginal bleeding.\par \par 05/19/2020\par JAY BOWMAN a 54 year F is here today with son for follow up of FIGO IVB endometrial cancer. \par Pt prefers her son translates today's visit. \par Pt denies abdominal pain, vaginal bleeding, NVD, fever, chills. \par She is s/p 2nd cycle of Keytruda, tolerated well. \par  : 31\par \par 6/9/20\par Pt is here for follow up.\par No new complaints.\par Feels well. Denies any abd pain, nausea, vomiting, rash or fever.\par Has mild vag spotting\par \par 06/30/2020\par JAY BOWMAN a 54 year F is here today for follow up of FIGO IVB endometrial cancer. \par S/p 4 cycles of Keytruda.\par She feels well. Denies any new complaints.\par Denies fever, chills, SOB, vaginal bleeding, abdominal pain, distention. \par Last : 29\par CBC reviewed: H/H 13.5/39.2%.\par \par 7/21/2020\par 53 yo female accompanied by her son for follow up visit for endometrial cancer and evaluation prior to Keytruda #6.  She offers no new complaints.  \par Denies any fever, chills, SOB, vaginal bleeding, abdominal pain or distention.  Last : 35/39/68.\par CBC shows wbc=9.11, Hgb=13.8, rkv=371, ANC=4.89.  H=1.39 on 6/30/2020. She was COVID tested 7/17/2020 not detected.\par \par 8/11/20\par Pt is here for follow up.\par No complaints of abd pain, N, v, D or vaginal bleeding.\par Has mild tingling of feet and finger tips.\par No fever.\par \par 9/1/20\par Pt is here for follow up.\par Tolerating Keytruda well.\par Patient denies abdominal pain or bloating, denies vaginal bleeding or discharge.\par Patient denies shortness of breath, denies fever, denies bone pain.\par Has occasional vaginal discomfort.\par \par 9/22/20\par Pt is here  for follow up.\par No new complaints, Denies abdominal pain, N, V, d\par No vag bleeding\par No SOB.\par \par 10/13/20\par Pt is here for follow up.\par No vag bleeding, N, V, D, abd pain.\par No side effects from immunotherapy\par \par 11/3/20\par Pt is here for follow up.\par No new complaints.\par No N, V, D, Abd pain.\par No vag bleeding.\par Tolerating immunotherapy well.\par \par 11/24/2020\par Patient is here today for follow up visit for endometrial cancer.  She is tolerating immunotherapy-  Keytruda well.  She offers no new complaints except for mild paresthesias.  Denies any N/V/D/C or vaginal bleeding.  Last =28.\par \par 12/15/20\par Pt is here for follow up visit.\par No complaints.\par Feels well, no abd pain, n. V, diarrhea.\par No vag bleeding.\par has not made appt for Ct scans yet\par \par 1/5/2021\par Pt is her to f/u for endometrial cancer\par No complaints of abdominal pain, or vaginal bleeding\par Feels well with good appetite\par Pt requesting  flu vaccine\par \par 1/26/21\par Pt is here for follow up. Feels well.\par Saw GYN and was offered interval debulking scheduled for 2/15\par No vag bleeding\par \par 2/16/21\par Pt is here for follow up.\par Her surgery has been rescheduled to 3/1/21\par Feels well, no abd pain, N, V, D, vag bleeding\par States her sugar levels have been running high. She will be seeing her PCP\par \par 3/23/21\par Pt is here for follow up. She is s/p surgery 08-Mar-2021 .\par She underwent Peritoneal biopsy \par Pelvic and para-aortic lymphadenectomy\par \par Robot-assisted omentectomy\par Hysterectomy, total,  with salpingo-oophorectomy \par Operative Findings:\par - Operative Findings EUA: Uterus 14w size, normal external genitalia, no gross\par lesions.\par Laparoscopy: Uterus, cervix bilateral fallopian tubes and ovaries grossly\par normal. Anterior abdominal wall nodularity likely cancer responding to\par treatment. No gross tumor visualized, upper abdomen without gross diease.\par Bilateral enlarged bulky pelvic LN 2-3cm in size.\par \par Pathology as noted below\par \par Pt is recovering well from surgery. Has Nl Bowel habits. No post op complications.\par Has been having high bld sugar, will talk to PCP\par \par 4/13/21\par pt is here for follow up and treatment. feels well.No abd pain, N, V, d\par No vag bleeding\par \par 5/7/21\par Pt is here for a follow up.\par Feels well. No new complaints.\par No abd pain, rash, vag bleeding\par No N, V, D, cough.\par \par 5/25/2021\par Pt is here to f/u for endometrial cancer.\par She is s/p Ketruda cycle #17, tolerating well.\par She feels well today, appetite is good.\par She denies abdominal pain, nausea/vomiting, or diarrhea.\par She c/o of mild tingling of finger and feet.\par \par 6/15/21\par Pt  is here for folow up.\par No abd pain, N, V, d, constipation.\par No vag bleeding\par \par 7/6/21\par Pt is here for follow up.\par Feels well. No new symptoms.\par No abdominal pain, N, V, D, vag bleeding.\par 7/27/21\par Pt is here for follow up.\par Feels well.\par No abd pain , N, V, D, vag bleeding\par \par 8/17/21\par Pt is feeling well.No abd pain, N, V, D\par \par 9/7/21\par Pt is here for follow up.\par Feels well. No n V, d.\par No abd pain.No vag bleeding.\par Has not gone for CT scans yet.

## 2021-09-07 NOTE — REVIEW OF SYSTEMS
[Negative] : Heme/Lymph [Fatigue] : no fatigue [Vaginal Discharge] : no vaginal discharge [de-identified] : Mild numbness in fingers and feet

## 2021-09-07 NOTE — ASSESSMENT
[FreeTextEntry1] : This is a 56 year old female with PMH of DM II,  diagnosed with FIGO IVB well to moderately differentiated endometrioid cancer with focal squamous differentiation and focal clear cell changes.  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 FIGO IVB endometrial cancer\par - s/p 5 cycles of  carboplatin (AUC 6) and taxol (175 mg/m2) every 3 weeks. \par - Was switched from Carboplatin/Taxol to Keytruda.  NGS testing sent and reviewed.\par \par Although the patient had a stage IV disease at diagnosis, with neoadjuvant chemo, she got downstaged.\par Had  d/w Rad/Onc if there is any benefit for RT: no role for RT\par \par  Pt had surgery on 3/8/21. Pathology report d/w pt and her family. Seems like she had a good response to neoadjuvant treatment. Reviewed the operative report, there was no gross residual disease left post surgery.\par 3/5/21 left pelvic Lns were positive for Ca, Pathologic stage pT1N1a Stage IIIC1\par \par PLAN:\par Will continue cycle # 23 of Keytruda  mg once every 3 weeks until disease progression or unacceptable toxicity. CBC is adequate.  has been stable\par \par Kidney function and liver enzymes are stable. \par - Side effects discussed including: Peripheral edema, cardiac arrhythmia, Fatigue, Pruritus, skin rash, Hyperglycemia, hypoalbuminemia, hypertriglyceridemia , hyponatremia , hypophosphatemia, hypocalcemia, decreased serum bicarbonate , hypercholesterolemia hypokalemia, hypoglycemia, hypercalcemia, hypothyroidism , hyperkalemia, Diarrhea , decreased appetite, constipation , abdominal pain , nausea, vomiting hematuria, anemia, lymphocytopenia, leukopenia, neutropenia, thrombocytopenia, hemorrhage, increased INR, prolonged partial thromboplastin time, abnormal liver enzymes.\par \par MONITORING as follows\par Liver enzymes at baseline and periodically during treatment; renal function; thyroid function (at baseline, periodically during treatment and as clinically indicated); glucose; CBC with differential;\par signs/symptoms of colitis, dermatologic toxicity, hypophysitis, thyroid disorders, pneumonitis and infusion reactions.\par \par The immunotherapy dose was adjusted according to pt's height, weight, labs and anticipated tolerance.\par The high risk of complications and complexity associated with antineoplastic therapy administration has been explained to the pt.\par Treatment will be administered under my direct supervision\par \par Labs ordered today includes CMP.\par Pt will go for Ct scans this week\par \par RTC in 3 weeks\par \par

## 2021-09-07 NOTE — CONSULT LETTER
[Dear  ___] : Dear  [unfilled], [Courtesy Letter:] : I had the pleasure of seeing your patient, [unfilled], in my office today. [Please see my note below.] : Please see my note below. [Consult Closing:] : Thank you very much for allowing me to participate in the care of this patient.  If you have any questions, please do not hesitate to contact me. [Sincerely,] : Sincerely, [DrShabbir  ___] : Dr. THAKUR [FreeTextEntry2] : Dr. ELLYN Campuzano [FreeTextEntry3] : Demetrice Willard MD\par Professor [employer]\par Adam/Dorian\par Attending Physician\par [facility]\par [phone]\par \par

## 2021-09-09 ENCOUNTER — RESULT REVIEW (OUTPATIENT)
Age: 56
End: 2021-09-09

## 2021-09-09 ENCOUNTER — OUTPATIENT (OUTPATIENT)
Dept: OUTPATIENT SERVICES | Facility: HOSPITAL | Age: 56
LOS: 1 days | Discharge: HOME | End: 2021-09-09
Payer: SUBSIDIZED

## 2021-09-09 DIAGNOSIS — C54.1 MALIGNANT NEOPLASM OF ENDOMETRIUM: ICD-10-CM

## 2021-09-09 LAB
ALBUMIN SERPL ELPH-MCNC: 4.2 G/DL
ALP BLD-CCNC: 71 U/L
ALT SERPL-CCNC: 18 U/L
ANION GAP SERPL CALC-SCNC: 11 MMOL/L
AST SERPL-CCNC: 16 U/L
BILIRUB SERPL-MCNC: 0.2 MG/DL
BUN SERPL-MCNC: 12 MG/DL
CALCIUM SERPL-MCNC: 9.2 MG/DL
CANCER AG125 SERPL-ACNC: 12 U/ML
CHLORIDE SERPL-SCNC: 95 MMOL/L
CO2 SERPL-SCNC: 24 MMOL/L
CREAT SERPL-MCNC: 0.7 MG/DL
GLUCOSE SERPL-MCNC: 212 MG/DL
POTASSIUM SERPL-SCNC: 4.2 MMOL/L
PROT SERPL-MCNC: 7.2 G/DL
SODIUM SERPL-SCNC: 130 MMOL/L
TSH SERPL-ACNC: 1.2 UIU/ML

## 2021-09-09 PROCEDURE — 74177 CT ABD & PELVIS W/CONTRAST: CPT | Mod: 26

## 2021-09-09 PROCEDURE — 71260 CT THORAX DX C+: CPT | Mod: 26

## 2021-09-28 ENCOUNTER — APPOINTMENT (OUTPATIENT)
Dept: HEMATOLOGY ONCOLOGY | Facility: CLINIC | Age: 56
End: 2021-09-28
Payer: COMMERCIAL

## 2021-09-28 ENCOUNTER — APPOINTMENT (OUTPATIENT)
Dept: INFUSION THERAPY | Facility: CLINIC | Age: 56
End: 2021-09-28
Payer: COMMERCIAL

## 2021-09-28 ENCOUNTER — LABORATORY RESULT (OUTPATIENT)
Age: 56
End: 2021-09-28

## 2021-09-28 VITALS
DIASTOLIC BLOOD PRESSURE: 68 MMHG | HEART RATE: 62 BPM | TEMPERATURE: 97.2 F | BODY MASS INDEX: 25.91 KG/M2 | WEIGHT: 132 LBS | SYSTOLIC BLOOD PRESSURE: 113 MMHG | OXYGEN SATURATION: 97 % | HEIGHT: 60 IN

## 2021-09-28 DIAGNOSIS — Z51.11 ENCOUNTER FOR ANTINEOPLASTIC CHEMOTHERAPY: ICD-10-CM

## 2021-09-28 LAB
HCT VFR BLD CALC: 38.4 %
HGB BLD-MCNC: 13.3 G/DL
MCHC RBC-ENTMCNC: 29.4 PG
MCHC RBC-ENTMCNC: 34.6 G/DL
MCV RBC AUTO: 85 FL
PLATELET # BLD AUTO: 221 K/UL
PMV BLD: 11.2 FL
RBC # BLD: 4.52 M/UL
RBC # FLD: 13.9 %
WBC # FLD AUTO: 10.17 K/UL

## 2021-09-28 PROCEDURE — 99215 OFFICE O/P EST HI 40 MIN: CPT

## 2021-09-28 RX ORDER — PEMBROLIZUMAB 25 MG/ML
200 INJECTION, SOLUTION INTRAVENOUS ONCE
Refills: 0 | Status: COMPLETED | OUTPATIENT
Start: 2021-09-28 | End: 2021-09-28

## 2021-09-28 RX ORDER — PEMBROLIZUMAB 25 MG/ML
200 INJECTION, SOLUTION INTRAVENOUS ONCE
Refills: 0 | Status: DISCONTINUED | OUTPATIENT
Start: 2021-09-28 | End: 2021-09-28

## 2021-09-28 RX ADMIN — PEMBROLIZUMAB 216 MILLIGRAM(S): 25 INJECTION, SOLUTION INTRAVENOUS at 15:17

## 2021-09-29 LAB
ALBUMIN SERPL ELPH-MCNC: 4.6 G/DL
ALP BLD-CCNC: 79 U/L
ALT SERPL-CCNC: 15 U/L
ANION GAP SERPL CALC-SCNC: 14 MMOL/L
AST SERPL-CCNC: 17 U/L
BILIRUB SERPL-MCNC: <0.2 MG/DL
BUN SERPL-MCNC: 10 MG/DL
CALCIUM SERPL-MCNC: 9.5 MG/DL
CHLORIDE SERPL-SCNC: 98 MMOL/L
CO2 SERPL-SCNC: 24 MMOL/L
CREAT SERPL-MCNC: 0.6 MG/DL
GLUCOSE SERPL-MCNC: 129 MG/DL
POTASSIUM SERPL-SCNC: 4.8 MMOL/L
PROT SERPL-MCNC: 8.4 G/DL
SODIUM SERPL-SCNC: 136 MMOL/L
TSH SERPL-ACNC: 1.08 UIU/ML

## 2021-10-01 PROBLEM — Z51.11 CHEMOTHERAPY MANAGEMENT, ENCOUNTER FOR: Status: ACTIVE | Noted: 2020-01-20

## 2021-10-01 NOTE — REVIEW OF SYSTEMS
[Negative] : Heme/Lymph [Fatigue] : no fatigue [Vaginal Discharge] : no vaginal discharge [de-identified] : Mild numbness in fingers and feet

## 2021-10-01 NOTE — PHYSICAL EXAM
[Fully active, able to carry on all pre-disease performance without restriction] : Status 0 - Fully active, able to carry on all pre-disease performance without restriction [Normal] : affect appropriate [de-identified] : Healed laparotomy scars, no tenderness

## 2021-10-01 NOTE — HISTORY OF PRESENT ILLNESS
[de-identified] : 54 yof, PMH of DM II, presented to ED in 10/2019 after experiencing worsening of chronic, intermittent pelvic pain and vaginal bleeding.  Patient underwent transvaginal ultrasound, which showed 3.5 x 2.7 x 4.4 cm cervical mass.  CT abdomen/pelvis revealed R iliac chain Ln 1.5 x 1.4 cm, along with 2.2 x 1.8 cm portacaval LNs, and enlargement of uterus.  Endometrial biopsy revealed well to moderately differentiated endometrioid cancer with focal squamous differentiation and focal clear cell changes.  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   CT chest negative for evidence of intrathoracic metastases.  Patient was initially scheduled for ALESHA/BSO on 11/25/2019.  However, MRI pelvis revealed L adnexal peritoneal tumor implants, enlarged pelvic and retroperitoneal lymph nodes, FIGO stage IVB.  ALESHA/BSO was subsequently canceled.  Patient recently saw Dr. Perez, who recommended chemotherapy alone.  Patient is currently only having occasional, mild vaginal bleeding and intermittent pelvic pain, which responds to tylenol.\par \par Family history is significant for history of uterine cancer in paternal grandmother (per patient, diagnosed at around 55 years) and "blood cancer" in maternal grandmother.\par \par Patient lives with her family and is fully functional at baseline.  She used to work as a  but is now no longer working.  She is also is in the process of applying for Medicaid. [de-identified] : Microsatellite status MSI-High §\par Tumor Mutational [place] 42 Muts/Mb §\par ARID1A P427fs*6\par ARID1A Q1521rq*15\par ATRX I127fs*7\par CASP8 splice site 305+1G>A\par CCND1 P287A\par CDC73 E325*\par CREBBP A146fs*6\par CTCF T204fs*26\par FGFR3 V630M\par FLCN H429fs*39\par JAK1 K860fs*16\par KRAS G12D\par MET U990kra\par MSH3 K383fs*32\par NFE2L2 E82D\par PDGFRB R919W\par PIK3CA R38C\par PIK3R1 M126ffx\par PTEN V166fs*14\par RNF43 G659fs*41\par TP53 D7H  [de-identified] : 1/20/20 : The patient is here for follow up .She received 2 doses of ferraheme prior to chemo . She tolerated carbo, taxol with mild symptoms of nausea and vomiting. She lost her hair , and reported mild numbness of fingers. She reported also minimal vaginal bleed. Her abdominal pain resolved and she is able to ambulate with no problems\par \par 2/10/2020: The patient is here for follow up , she tolerated her second cycle of chemotherapy with no problems , except for mild nausea and vomiting for the first few days. She also reported some mucousy discharge from vagina. She is eating well , no abdominal pain. \par \par 3/2/2020: The patient is here for follow up . She is tolerating chemotherapy with no major side effects except for nausea in the following days.  She had a CT C/A/P after her 3rd cycle which showed mixed response. She denies any vaginal bleed, no rectal pain on defecation, no abdominal pain. \par \par \par \par 3/23/2020: The patient is here for follow up. She is tolerating chemotherapy with no major side effects . She is reporting nausea in the following days after chemotherapy. She denies fever, chills, abdominal pain , no constipation or diarrhea . No vaginal bleeding\par \par 04/07/2020\par JAY BOWMAN a 54 year F is here today for follow up of FIGO IVB endometrial cancer.\par Had  Ronnie number [id number].\par S/p 4 cycles of carbo and taxol, tolerating well. Cancelled last chemo appt due to corona virus. \par Reports lower abdominal cramping 2/10 started today. Denies vaginal bleeding today. Did have spotting x 1 since last treatment.\par Denies NVD, fever, chills. \par Pt reports peripheral neuropathy in fingers b/l not bothersome \par Had GYN f/up 3 weeks ago. \par CBC reviewed: WBC 8.6, h/H 12.8/36.9%, , Neutrophils 5.59\par \par 4/28/20: \par History obtained through  [facility] from [email or web address]. ID# [id number]\par Pt returns for a f/u vist. She is S/P 1st cycle of keytruda. She tolerated it fairly well. Abdominal pain has resolved. She continues to have mild yellowish vaginal discharge. No vaginal bleeding.\par \par 05/19/2020\par JAY BOWMAN a 54 year F is here today with son for follow up of FIGO IVB endometrial cancer. \par Pt prefers her son translates today's visit. \par Pt denies abdominal pain, vaginal bleeding, NVD, fever, chills. \par She is s/p 2nd cycle of Keytruda, tolerated well. \par  : 31\par \par 6/9/20\par Pt is here for follow up.\par No new complaints.\par Feels well. Denies any abd pain, nausea, vomiting, rash or fever.\par Has mild vag spotting\par \par 06/30/2020\par JAY BOWMAN a 54 year F is here today for follow up of FIGO IVB endometrial cancer. \par S/p 4 cycles of Keytruda.\par She feels well. Denies any new complaints.\par Denies fever, chills, SOB, vaginal bleeding, abdominal pain, distention. \par Last : 29\par CBC reviewed: H/H 13.5/39.2%.\par \par 7/21/2020\par 53 yo female accompanied by her son for follow up visit for endometrial cancer and evaluation prior to Keytruda #6.  She offers no new complaints.  \par Denies any fever, chills, SOB, vaginal bleeding, abdominal pain or distention.  Last : 35/39/68.\par CBC shows wbc=9.11, Hgb=13.8, glo=801, ANC=4.89.  H=1.39 on 6/30/2020. She was COVID tested 7/17/2020 not detected.\par \par 8/11/20\par Pt is here for follow up.\par No complaints of abd pain, N, v, D or vaginal bleeding.\par Has mild tingling of feet and finger tips.\par No fever.\par \par 9/1/20\par Pt is here for follow up.\par Tolerating Keytruda well.\par Patient denies abdominal pain or bloating, denies vaginal bleeding or discharge.\par Patient denies shortness of breath, denies fever, denies bone pain.\par Has occasional vaginal discomfort.\par \par 9/22/20\par Pt is here  for follow up.\par No new complaints, Denies abdominal pain, N, V, d\par No vag bleeding\par No SOB.\par \par 10/13/20\par Pt is here for follow up.\par No vag bleeding, N, V, D, abd pain.\par No side effects from immunotherapy\par \par 11/3/20\par Pt is here for follow up.\par No new complaints.\par No N, V, D, Abd pain.\par No vag bleeding.\par Tolerating immunotherapy well.\par \par 11/24/2020\par Patient is here today for follow up visit for endometrial cancer.  She is tolerating immunotherapy-  Keytruda well.  She offers no new complaints except for mild paresthesias.  Denies any N/V/D/C or vaginal bleeding.  Last =28.\par \par 12/15/20\par Pt is here for follow up visit.\par No complaints.\par Feels well, no abd pain, n. V, diarrhea.\par No vag bleeding.\par has not made appt for Ct scans yet\par \par 1/5/2021\par Pt is her to f/u for endometrial cancer\par No complaints of abdominal pain, or vaginal bleeding\par Feels well with good appetite\par Pt requesting  flu vaccine\par \par 1/26/21\par Pt is here for follow up. Feels well.\par Saw GYN and was offered interval debulking scheduled for 2/15\par No vag bleeding\par \par 2/16/21\par Pt is here for follow up.\par Her surgery has been rescheduled to 3/1/21\par Feels well, no abd pain, N, V, D, vag bleeding\par States her sugar levels have been running high. She will be seeing her PCP\par \par 3/23/21\par Pt is here for follow up. She is s/p surgery 08-Mar-2021 .\par She underwent Peritoneal biopsy \par Pelvic and para-aortic lymphadenectomy\par \par Robot-assisted omentectomy\par Hysterectomy, total,  with salpingo-oophorectomy \par Operative Findings:\par - Operative Findings EUA: Uterus 14w size, normal external genitalia, no gross\par lesions.\par Laparoscopy: Uterus, cervix bilateral fallopian tubes and ovaries grossly\par normal. Anterior abdominal wall nodularity likely cancer responding to\par treatment. No gross tumor visualized, upper abdomen without gross diease.\par Bilateral enlarged bulky pelvic LN 2-3cm in size.\par \par Pathology as noted below\par \par Pt is recovering well from surgery. Has Nl Bowel habits. No post op complications.\par Has been having high bld sugar, will talk to PCP\par \par 4/13/21\par pt is here for follow up and treatment. feels well.No abd pain, N, V, d\par No vag bleeding\par \par 5/7/21\par Pt is here for a follow up.\par Feels well. No new complaints.\par No abd pain, rash, vag bleeding\par No N, V, D, cough.\par \par 5/25/2021\par Pt is here to f/u for endometrial cancer.\par She is s/p Ketruda cycle #17, tolerating well.\par She feels well today, appetite is good.\par She denies abdominal pain, nausea/vomiting, or diarrhea.\par She c/o of mild tingling of finger and feet.\par \par 6/15/21\par Pt  is here for folow up.\par No abd pain, N, V, d, constipation.\par No vag bleeding\par \par 7/6/21\par Pt is here for follow up.\par Feels well. No new symptoms.\par No abdominal pain, N, V, D, vag bleeding.\par 7/27/21\par Pt is here for follow up.\par Feels well.\par No abd pain , N, V, D, vag bleeding\par \par 8/17/21\par Pt is feeling well.No abd pain, N, V, D\par \par 9/7/21\par Pt is here for follow up.\par Feels well. No n V, d.\par No abd pain.No vag bleeding.\par Has not gone for CT scans yet.\par \par 9/28/21\par Pt is here for follow up.\par Feels well. No abd pain, N, V, D, vag bleeding

## 2021-10-01 NOTE — ASSESSMENT
[FreeTextEntry1] : This is a 56 year old female with PMH of DM II,  diagnosed with FIGO IVB well to moderately differentiated endometrioid cancer with focal squamous differentiation and focal clear cell changes.  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 FIGO IVB endometrial cancer\par - s/p 5 cycles of  carboplatin (AUC 6) and taxol (175 mg/m2) every 3 weeks. \par - Was switched from Carboplatin/Taxol to Keytruda.  NGS testing sent and reviewed.\par \par Although the patient had a stage IV disease at diagnosis, with neoadjuvant chemo, she got downstaged.\par Had  d/w Rad/Onc if there is any benefit for RT: no role for RT\par \par  Pt had surgery on 3/8/21. Pathology report d/w pt and her family. Seems like she had a good response to neoadjuvant treatment. Reviewed the operative report, there was no gross residual disease left post surgery.\par 3/5/21 left pelvic Lns were positive for Ca, Pathologic stage pT1N1a Stage IIIC1\par \par PLAN:\par Will continue cycle # 24 of Keytruda  mg once every 3 weeks until disease progression or unacceptable toxicity. CBC is adequate.  has been stable\par \par Kidney function and liver enzymes are stable. \par - Side effects discussed including: Peripheral edema, cardiac arrhythmia, Fatigue, Pruritus, skin rash, Hyperglycemia, hypoalbuminemia, hypertriglyceridemia , hyponatremia , hypophosphatemia, hypocalcemia, decreased serum bicarbonate , hypercholesterolemia hypokalemia, hypoglycemia, hypercalcemia, hypothyroidism , hyperkalemia, Diarrhea , decreased appetite, constipation , abdominal pain , nausea, vomiting hematuria, anemia, lymphocytopenia, leukopenia, neutropenia, thrombocytopenia, hemorrhage, increased INR, prolonged partial thromboplastin time, abnormal liver enzymes.\par \par MONITORING as follows\par Liver enzymes at baseline and periodically during treatment; renal function; thyroid function (at baseline, periodically during treatment and as clinically indicated); glucose; CBC with differential;\par signs/symptoms of colitis, dermatologic toxicity, hypophysitis, thyroid disorders, pneumonitis and infusion reactions.\par \par The immunotherapy dose was adjusted according to pt's height, weight, labs and anticipated tolerance.\par The high risk of complications and complexity associated with antineoplastic therapy administration has been explained to the pt.\par Treatment will be administered under my direct supervision\par \par Labs ordered today includes CMP.\par Rslts of  Ct scans d/w pt and son, slightly enlarged LNs will be monitored.\par \par RTC in 3 weeks\par \par

## 2021-10-19 ENCOUNTER — APPOINTMENT (OUTPATIENT)
Dept: HEMATOLOGY ONCOLOGY | Facility: CLINIC | Age: 56
End: 2021-10-19

## 2021-11-10 ENCOUNTER — APPOINTMENT (OUTPATIENT)
Dept: HEMATOLOGY ONCOLOGY | Facility: CLINIC | Age: 56
End: 2021-11-10
Payer: SELF-PAY

## 2021-11-10 ENCOUNTER — APPOINTMENT (OUTPATIENT)
Dept: INFUSION THERAPY | Facility: CLINIC | Age: 56
End: 2021-11-10

## 2021-11-10 ENCOUNTER — OUTPATIENT (OUTPATIENT)
Dept: OUTPATIENT SERVICES | Facility: HOSPITAL | Age: 56
LOS: 1 days | Discharge: HOME | End: 2021-11-10

## 2021-11-10 ENCOUNTER — LABORATORY RESULT (OUTPATIENT)
Age: 56
End: 2021-11-10

## 2021-11-10 VITALS
DIASTOLIC BLOOD PRESSURE: 73 MMHG | RESPIRATION RATE: 14 BRPM | WEIGHT: 131 LBS | HEIGHT: 60 IN | BODY MASS INDEX: 25.72 KG/M2 | SYSTOLIC BLOOD PRESSURE: 121 MMHG | HEART RATE: 74 BPM | TEMPERATURE: 97.1 F

## 2021-11-10 DIAGNOSIS — Z51.12 ENCOUNTER FOR ANTINEOPLASTIC IMMUNOTHERAPY: ICD-10-CM

## 2021-11-10 DIAGNOSIS — C54.1 MALIGNANT NEOPLASM OF ENDOMETRIUM: ICD-10-CM

## 2021-11-10 DIAGNOSIS — Z23 ENCOUNTER FOR IMMUNIZATION: ICD-10-CM

## 2021-11-10 LAB
HCT VFR BLD CALC: 40.7 %
HGB BLD-MCNC: 13.9 G/DL
MCHC RBC-ENTMCNC: 29.2 PG
MCHC RBC-ENTMCNC: 34.2 G/DL
MCV RBC AUTO: 85.5 FL
PLATELET # BLD AUTO: 209 K/UL
PMV BLD: 11.2 FL
RBC # BLD: 4.76 M/UL
RBC # FLD: 14.1 %
WBC # FLD AUTO: 8.61 K/UL

## 2021-11-10 PROCEDURE — 99215 OFFICE O/P EST HI 40 MIN: CPT

## 2021-11-10 RX ORDER — INFLUENZA VIRUS VACCINE 15; 15; 15; 15 UG/.5ML; UG/.5ML; UG/.5ML; UG/.5ML
0.5 SUSPENSION INTRAMUSCULAR ONCE
Refills: 0 | Status: COMPLETED | OUTPATIENT
Start: 2021-11-10 | End: 2021-11-10

## 2021-11-10 RX ORDER — PEMBROLIZUMAB 25 MG/ML
200 INJECTION, SOLUTION INTRAVENOUS ONCE
Refills: 0 | Status: COMPLETED | OUTPATIENT
Start: 2021-11-10 | End: 2021-11-10

## 2021-11-10 RX ADMIN — INFLUENZA VIRUS VACCINE 0.5 MILLILITER(S): 15; 15; 15; 15 SUSPENSION INTRAMUSCULAR at 15:06

## 2021-11-10 RX ADMIN — PEMBROLIZUMAB 216 MILLIGRAM(S): 25 INJECTION, SOLUTION INTRAVENOUS at 15:05

## 2021-11-10 NOTE — CONSULT LETTER
[Dear  ___] : Dear  [unfilled], [Courtesy Letter:] : I had the pleasure of seeing your patient, [unfilled], in my office today. [Please see my note below.] : Please see my note below. [Consult Closing:] : Thank you very much for allowing me to participate in the care of this patient.  If you have any questions, please do not hesitate to contact me. [Sincerely,] : Sincerely, [DrShabbir  ___] : Dr. THAKUR [FreeTextEntry3] : Demetrice Willard MD\par Professor [employer]\par Adam/Dorian\par Attending Physician\par [facility]\par [phone]\par \par

## 2021-11-10 NOTE — ASSESSMENT
[FreeTextEntry1] : This is a 56 year old female with PMH of DM II,  diagnosed with FIGO IVB well to moderately differentiated endometrioid cancer with focal squamous differentiation and focal clear cell changes.  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 FIGO IVB endometrial cancer\par - s/p 5 cycles of  Carboplatin (AUC 6) and Taxol (175 mg/m2) every 3 weeks. \par - Was switched from Carboplatin/Taxol to Keytruda.  NGS testing sent and reviewed.\par \par Although the patient had a stage IV disease at diagnosis, with neoadjuvant chemo, she got downstaged.\par Had  d/w Rad/Onc if there is any benefit for RT: no role for RT\par \par  Pt had surgery on 3/8/21. Pathology report d/w pt and her family. Seems like she had a good response to neoadjuvant treatment. Reviewed the operative report, there was no gross residual disease left post surgery.\par 3/5/21 left pelvic Lns were positive for Ca, Pathologic stage pT1N1a Stage IIIC1.\par \par  has been stable.\par TSH and liver enzymes are stable.\par \par PLAN:\par Previous notes reviewed and all relevant laboratory and radiology results discussed with Dr Willard and communicated to the patient.\par \par -- Will continue cycle # 27 of Keytruda  mg once every 3 weeks until disease progression or unacceptable toxicity. CBC today is adequate. \par \par Side effects discussed including: Peripheral edema, cardiac arrhythmia, Fatigue, Pruritus, skin rash, Hyperglycemia, hypoalbuminemia, hypertriglyceridemia , hyponatremia , hypophosphatemia, hypocalcemia, decreased serum bicarbonate , hypercholesterolemia hypokalemia, hypoglycemia, hypercalcemia, hypothyroidism , hyperkalemia, Diarrhea , decreased appetite, constipation , abdominal pain , nausea, vomiting hematuria, anemia, lymphocytopenia, leukopenia, neutropenia, thrombocytopenia, hemorrhage, increased INR, prolonged partial thromboplastin time, abnormal liver enzymes.\par \par MONITORING as follows:\par Liver enzymes at baseline and periodically during treatment; renal function; thyroid function (at baseline, periodically during treatment and as clinically indicated); glucose; CBC with differential;\par signs/symptoms of colitis, dermatologic toxicity, hypophysitis, thyroid disorders, pneumonitis and infusion reactions.\par \par Treatment will be administered under my direct supervision\par \par -- Labs ordered today includes CMP, , TSH and FreeT4.\par \par #Flu vaccine immunization\par -- Give flu vaccine 0.5 ml IM\par \par RTC in 3 weeks.\par \par Case was seen and discussed with Dr. Willard who agreed with assessment and plan.\par \par \par

## 2021-11-10 NOTE — HISTORY OF PRESENT ILLNESS
[de-identified] : 54 yof, PMH of DM II, presented to ED in 10/2019 after experiencing worsening of chronic, intermittent pelvic pain and vaginal bleeding.  Patient underwent transvaginal ultrasound, which showed 3.5 x 2.7 x 4.4 cm cervical mass.  CT abdomen/pelvis revealed R iliac chain Ln 1.5 x 1.4 cm, along with 2.2 x 1.8 cm portacaval LNs, and enlargement of uterus.  Endometrial biopsy revealed well to moderately differentiated endometrioid cancer with focal squamous differentiation and focal clear cell changes.  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   CT chest negative for evidence of intrathoracic metastases.  Patient was initially scheduled for ALESHA/BSO on 11/25/2019.  However, MRI pelvis revealed L adnexal peritoneal tumor implants, enlarged pelvic and retroperitoneal lymph nodes, FIGO stage IVB.  ALESHA/BSO was subsequently canceled.  Patient recently saw Dr. Perez, who recommended chemotherapy alone.  Patient is currently only having occasional, mild vaginal bleeding and intermittent pelvic pain, which responds to tylenol.\par \par Family history is significant for history of uterine cancer in paternal grandmother (per patient, diagnosed at around 55 years) and "blood cancer" in maternal grandmother.\par \par Patient lives with her family and is fully functional at baseline.  She used to work as a  but is now no longer working.  She is also is in the process of applying for Medicaid. [de-identified] : Microsatellite status MSI-High §\par Tumor Mutational [place] 42 Muts/Mb §\par ARID1A P427fs*6\par ARID1A O1802rg*15\par ATRX I127fs*7\par CASP8 splice site 305+1G>A\par CCND1 P287A\par CDC73 E325*\par CREBBP A146fs*6\par CTCF T204fs*26\par FGFR3 V630M\par FLCN H429fs*39\par JAK1 K860fs*16\par KRAS G12D\par MET M065ehi\par MSH3 K383fs*32\par NFE2L2 E82D\par PDGFRB R919W\par PIK3CA R38C\par PIK3R1 S373nqj\par PTEN V166fs*14\par RNF43 G659fs*41\par TP53 D7H  [de-identified] : 1/20/20 : The patient is here for follow up .She received 2 doses of ferraheme prior to chemo . She tolerated carbo, taxol with mild symptoms of nausea and vomiting. She lost her hair , and reported mild numbness of fingers. She reported also minimal vaginal bleed. Her abdominal pain resolved and she is able to ambulate with no problems\par \par 2/10/2020: The patient is here for follow up , she tolerated her second cycle of chemotherapy with no problems , except for mild nausea and vomiting for the first few days. She also reported some mucousy discharge from vagina. She is eating well , no abdominal pain. \par \par 3/2/2020: The patient is here for follow up . She is tolerating chemotherapy with no major side effects except for nausea in the following days.  She had a CT C/A/P after her 3rd cycle which showed mixed response. She denies any vaginal bleed, no rectal pain on defecation, no abdominal pain. \par \par \par \par 3/23/2020: The patient is here for follow up. She is tolerating chemotherapy with no major side effects . She is reporting nausea in the following days after chemotherapy. She denies fever, chills, abdominal pain , no constipation or diarrhea . No vaginal bleeding\par \par 04/07/2020\par JAY BOWMAN a 54 year F is here today for follow up of FIGO IVB endometrial cancer.\par Had  Ronnie number [id number].\par S/p 4 cycles of carbo and taxol, tolerating well. Cancelled last chemo appt due to corona virus. \par Reports lower abdominal cramping 2/10 started today. Denies vaginal bleeding today. Did have spotting x 1 since last treatment.\par Denies NVD, fever, chills. \par Pt reports peripheral neuropathy in fingers b/l not bothersome \par Had GYN f/up 3 weeks ago. \par CBC reviewed: WBC 8.6, h/H 12.8/36.9%, , Neutrophils 5.59\par \par 4/28/20: \par History obtained through  Manish from "[employer]. ID# [id number]\par Pt returns for a f/u vist. She is S/P 1st cycle of keytruda. She tolerated it fairly well. Abdominal pain has resolved. She continues to have mild yellowish vaginal discharge. No vaginal bleeding.\par \par 05/19/2020\par JAY BOWMAN a 54 year F is here today with son for follow up of FIGO IVB endometrial cancer. \par Pt prefers her son translates today's visit. \par Pt denies abdominal pain, vaginal bleeding, NVD, fever, chills. \par She is s/p 2nd cycle of Keytruda, tolerated well. \par  : 31\par \par 6/9/20\par Pt is here for follow up.\par No new complaints.\par Feels well. Denies any abd pain, nausea, vomiting, rash or fever.\par Has mild vag spotting\par \par 06/30/2020\par JAY BOWMAN a 54 year F is here today for follow up of FIGO IVB endometrial cancer. \par S/p 4 cycles of Keytruda.\par She feels well. Denies any new complaints.\par Denies fever, chills, SOB, vaginal bleeding, abdominal pain, distention. \par Last : 29\par CBC reviewed: H/H 13.5/39.2%.\par \par 7/21/2020\par 53 yo female accompanied by her son for follow up visit for endometrial cancer and evaluation prior to Keytruda #6.  She offers no new complaints.  \par Denies any fever, chills, SOB, vaginal bleeding, abdominal pain or distention.  Last : 35/39/68.\par CBC shows wbc=9.11, Hgb=13.8, whp=746, ANC=4.89.  H=1.39 on 6/30/2020. She was COVID tested 7/17/2020 not detected.\par \par 8/11/20\par Pt is here for follow up.\par No complaints of abd pain, N, v, D or vaginal bleeding.\par Has mild tingling of feet and finger tips.\par No fever.\par \par 9/1/20\par Pt is here for follow up.\par Tolerating Keytruda well.\par Patient denies abdominal pain or bloating, denies vaginal bleeding or discharge.\par Patient denies shortness of breath, denies fever, denies bone pain.\par Has occasional vaginal discomfort.\par \par 9/22/20\par Pt is here  for follow up.\par No new complaints, Denies abdominal pain, N, V, d\par No vag bleeding\par No SOB.\par \par 10/13/20\par Pt is here for follow up.\par No vag bleeding, N, V, D, abd pain.\par No side effects from immunotherapy\par \par 11/3/20\par Pt is here for follow up.\par No new complaints.\par No N, V, D, Abd pain.\par No vag bleeding.\par Tolerating immunotherapy well.\par \par 11/24/2020\par Patient is here today for follow up visit for endometrial cancer.  She is tolerating immunotherapy-  Keytruda well.  She offers no new complaints except for mild paresthesias.  Denies any N/V/D/C or vaginal bleeding.  Last =28.\par \par 12/15/20\par Pt is here for follow up visit.\par No complaints.\par Feels well, no abd pain, n. V, diarrhea.\par No vag bleeding.\par has not made appt for Ct scans yet\par \par 1/5/2021\par Pt is her to f/u for endometrial cancer\par No complaints of abdominal pain, or vaginal bleeding\par Feels well with good appetite\par Pt requesting  flu vaccine\par \par 1/26/21\par Pt is here for follow up. Feels well.\par Saw GYN and was offered interval debulking scheduled for 2/15\par No vag bleeding\par \par 2/16/21\par Pt is here for follow up.\par Her surgery has been rescheduled to 3/1/21\par Feels well, no abd pain, N, V, D, vag bleeding\par States her sugar levels have been running high. She will be seeing her PCP\par \par 3/23/21\par Pt is here for follow up. She is s/p surgery 08-Mar-2021 .\par She underwent Peritoneal biopsy \par Pelvic and para-aortic lymphadenectomy\par \par Robot-assisted omentectomy\par Hysterectomy, total,  with salpingo-oophorectomy \par Operative Findings:\par - Operative Findings EUA: Uterus 14w size, normal external genitalia, no gross\par lesions.\par Laparoscopy: Uterus, cervix bilateral fallopian tubes and ovaries grossly\par normal. Anterior abdominal wall nodularity likely cancer responding to\par treatment. No gross tumor visualized, upper abdomen without gross diease.\par Bilateral enlarged bulky pelvic LN 2-3cm in size.\par \par Pathology as noted below\par \par Pt is recovering well from surgery. Has Nl Bowel habits. No post op complications.\par Has been having high bld sugar, will talk to PCP\par \par 4/13/21\par pt is here for follow up and treatment. feels well.No abd pain, N, V, d\par No vag bleeding\par \par 5/7/21\par Pt is here for a follow up.\par Feels well. No new complaints.\par No abd pain, rash, vag bleeding\par No N, V, D, cough.\par \par 5/25/2021\par Pt is here to f/u for endometrial cancer.\par She is s/p Ketruda cycle #17, tolerating well.\par She feels well today, appetite is good.\par She denies abdominal pain, nausea/vomiting, or diarrhea.\par She c/o of mild tingling of finger and feet.\par \par 6/15/21\par Pt  is here for folow up.\par No abd pain, N, V, d, constipation.\par No vag bleeding\par \par 7/6/21\par Pt is here for follow up.\par Feels well. No new symptoms.\par No abdominal pain, N, V, D, vag bleeding.\par 7/27/21\par Pt is here for follow up.\par Feels well.\par No abd pain , N, V, D, vag bleeding\par \par 8/17/21\par Pt is feeling well.No abd pain, N, V, D\par \par 9/7/21\par Pt is here for follow up.\par Feels well. No n V, d.\par No abd pain.No vag bleeding.\par Has not gone for CT scans yet.\par \par 9/28/21\par Pt is here for follow up.\par Feels well. No abd pain, N, V, D, vag bleeding\par \par 11/10/2021\par Patient is here to follow up for endometrial cancer, accompanied by son.\par She is s/p Keytruda cycle #26, tolerating well.\par She feels well today, appetite is good.\par She denies nausea, vomiting, diarrhea, abdominal pain, vaginal discharge or bleeding.\par She is requesting for flu vaccine.

## 2021-11-10 NOTE — REASON FOR VISIT
[Follow-Up Visit] : a follow-up [Family Member] : family member [FreeTextEntry2] : FIGO IVB endometrial cancer

## 2021-11-10 NOTE — PHYSICAL EXAM
[Fully active, able to carry on all pre-disease performance without restriction] : Status 0 - Fully active, able to carry on all pre-disease performance without restriction [Normal] : affect appropriate [de-identified] : Healed laparotomy scars, no tenderness.

## 2021-11-11 LAB
ALBUMIN SERPL ELPH-MCNC: 4.7 G/DL
ALP BLD-CCNC: 70 U/L
ALT SERPL-CCNC: 19 U/L
ANION GAP SERPL CALC-SCNC: 15 MMOL/L
AST SERPL-CCNC: 22 U/L
BILIRUB SERPL-MCNC: 0.3 MG/DL
BUN SERPL-MCNC: 10 MG/DL
CALCIUM SERPL-MCNC: 10.1 MG/DL
CANCER AG125 SERPL-ACNC: 10 U/ML
CHLORIDE SERPL-SCNC: 101 MMOL/L
CO2 SERPL-SCNC: 23 MMOL/L
CREAT SERPL-MCNC: 0.5 MG/DL
GLUCOSE SERPL-MCNC: 201 MG/DL
POTASSIUM SERPL-SCNC: 5.1 MMOL/L
PROT SERPL-MCNC: 8.1 G/DL
SODIUM SERPL-SCNC: 139 MMOL/L
T4 FREE SERPL-MCNC: 1.1 NG/DL
TSH SERPL-ACNC: 1.7 UIU/ML

## 2021-12-01 ENCOUNTER — LABORATORY RESULT (OUTPATIENT)
Age: 56
End: 2021-12-01

## 2021-12-01 ENCOUNTER — APPOINTMENT (OUTPATIENT)
Dept: HEMATOLOGY ONCOLOGY | Facility: CLINIC | Age: 56
End: 2021-12-01
Payer: COMMERCIAL

## 2021-12-01 ENCOUNTER — APPOINTMENT (OUTPATIENT)
Dept: INFUSION THERAPY | Facility: CLINIC | Age: 56
End: 2021-12-01
Payer: COMMERCIAL

## 2021-12-01 VITALS — TEMPERATURE: 97.3 F | DIASTOLIC BLOOD PRESSURE: 67 MMHG | SYSTOLIC BLOOD PRESSURE: 114 MMHG | HEART RATE: 64 BPM

## 2021-12-01 LAB
HCT VFR BLD CALC: 40.2 %
HGB BLD-MCNC: 13.9 G/DL
MCHC RBC-ENTMCNC: 29.3 PG
MCHC RBC-ENTMCNC: 34.6 G/DL
MCV RBC AUTO: 84.8 FL
PLATELET # BLD AUTO: 162 K/UL
PMV BLD: 12.1 FL
RBC # BLD: 4.74 M/UL
RBC # FLD: 13.8 %
WBC # FLD AUTO: 10.06 K/UL

## 2021-12-01 PROCEDURE — 99215 OFFICE O/P EST HI 40 MIN: CPT

## 2021-12-01 RX ORDER — PEMBROLIZUMAB 25 MG/ML
200 INJECTION, SOLUTION INTRAVENOUS ONCE
Refills: 0 | Status: COMPLETED | OUTPATIENT
Start: 2021-12-01 | End: 2021-12-01

## 2021-12-01 RX ADMIN — PEMBROLIZUMAB 216 MILLIGRAM(S): 25 INJECTION, SOLUTION INTRAVENOUS at 15:25

## 2021-12-02 LAB
ALBUMIN SERPL ELPH-MCNC: 4.4 G/DL
ALP BLD-CCNC: 67 U/L
ALT SERPL-CCNC: 17 U/L
ANION GAP SERPL CALC-SCNC: 18 MMOL/L
AST SERPL-CCNC: 18 U/L
BILIRUB SERPL-MCNC: 0.4 MG/DL
BUN SERPL-MCNC: 13 MG/DL
CALCIUM SERPL-MCNC: 9.5 MG/DL
CANCER AG125 SERPL-ACNC: 12 U/ML
CHLORIDE SERPL-SCNC: 98 MMOL/L
CO2 SERPL-SCNC: 20 MMOL/L
CREAT SERPL-MCNC: 0.5 MG/DL
GLUCOSE SERPL-MCNC: 223 MG/DL
POTASSIUM SERPL-SCNC: 4.5 MMOL/L
PROT SERPL-MCNC: 7.6 G/DL
SODIUM SERPL-SCNC: 136 MMOL/L
T4 SERPL-MCNC: 6.7 UG/DL
TSH SERPL-ACNC: 1.53 UIU/ML

## 2021-12-03 NOTE — HISTORY OF PRESENT ILLNESS
[de-identified] : 54 yof, PMH of DM II, presented to ED in 10/2019 after experiencing worsening of chronic, intermittent pelvic pain and vaginal bleeding.  Patient underwent transvaginal ultrasound, which showed 3.5 x 2.7 x 4.4 cm cervical mass.  CT abdomen/pelvis revealed R iliac chain Ln 1.5 x 1.4 cm, along with 2.2 x 1.8 cm portacaval LNs, and enlargement of uterus.  Endometrial biopsy revealed well to moderately differentiated endometrioid cancer with focal squamous differentiation and focal clear cell changes.  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   CT chest negative for evidence of intrathoracic metastases.  Patient was initially scheduled for ALESHA/BSO on 11/25/2019.  However, MRI pelvis revealed L adnexal peritoneal tumor implants, enlarged pelvic and retroperitoneal lymph nodes, FIGO stage IVB.  ALESHA/BSO was subsequently canceled.  Patient recently saw Dr. Perez, who recommended chemotherapy alone.  Patient is currently only having occasional, mild vaginal bleeding and intermittent pelvic pain, which responds to tylenol.\par \par Family history is significant for history of uterine cancer in paternal grandmother (per patient, diagnosed at around 55 years) and "blood cancer" in maternal grandmother.\par \par Patient lives with her family and is fully functional at baseline.  She used to work as a  but is now no longer working.  She is also is in the process of applying for Medicaid. [de-identified] : Microsatellite status MSI-High §\par Tumor Mutational [place] 42 Muts/Mb §\par ARID1A P427fs*6\par ARID1A Y6040gi*15\par ATRX I127fs*7\par CASP8 splice site 305+1G>A\par CCND1 P287A\par CDC73 E325*\par CREBBP A146fs*6\par CTCF T204fs*26\par FGFR3 V630M\par FLCN H429fs*39\par JAK1 K860fs*16\par KRAS G12D\par MET V949nae\par MSH3 K383fs*32\par NFE2L2 E82D\par PDGFRB R919W\par PIK3CA R38C\par PIK3R1 T815uzc\par PTEN V166fs*14\par RNF43 G659fs*41\par TP53 D7H  [de-identified] : 1/20/20 : The patient is here for follow up .She received 2 doses of ferraheme prior to chemo . She tolerated carbo, taxol with mild symptoms of nausea and vomiting. She lost her hair , and reported mild numbness of fingers. She reported also minimal vaginal bleed. Her abdominal pain resolved and she is able to ambulate with no problems\par \par 2/10/2020: The patient is here for follow up , she tolerated her second cycle of chemotherapy with no problems , except for mild nausea and vomiting for the first few days. She also reported some mucousy discharge from vagina. She is eating well , no abdominal pain. \par \par 3/2/2020: The patient is here for follow up . She is tolerating chemotherapy with no major side effects except for nausea in the following days.  She had a CT C/A/P after her 3rd cycle which showed mixed response. She denies any vaginal bleed, no rectal pain on defecation, no abdominal pain. \par \par \par \par 3/23/2020: The patient is here for follow up. She is tolerating chemotherapy with no major side effects . She is reporting nausea in the following days after chemotherapy. She denies fever, chills, abdominal pain , no constipation or diarrhea . No vaginal bleeding\par \par 04/07/2020\par JAY BOWMAN a 54 year F is here today for follow up of FIGO IVB endometrial cancer.\par Had  Ronnie number [id number].\par S/p 4 cycles of carbo and taxol, tolerating well. Cancelled last chemo appt due to corona virus. \par Reports lower abdominal cramping 2/10 started today. Denies vaginal bleeding today. Did have spotting x 1 since last treatment.\par Denies NVD, fever, chills. \par Pt reports peripheral neuropathy in fingers b/l not bothersome \par Had GYN f/up 3 weeks ago. \par CBC reviewed: WBC 8.6, h/H 12.8/36.9%, , Neutrophils 5.59\par \par 4/28/20: \par History obtained through  Manish from [employer]. ID# [id number]\par Pt returns for a f/u vist. She is S/P 1st cycle of keytruda. She tolerated it fairly well. Abdominal pain has resolved. She continues to have mild yellowish vaginal discharge. No vaginal bleeding.\par \par 05/19/2020\par AJY BOWMAN a 54 year F is here today with son for follow up of FIGO IVB endometrial cancer. \par Pt prefers her son translates today's visit. \par Pt denies abdominal pain, vaginal bleeding, NVD, fever, chills. \par She is s/p 2nd cycle of Keytruda, tolerated well. \par  : 31\par \par 6/9/20\par Pt is here for follow up.\par No new complaints.\par Feels well. Denies any abd pain, nausea, vomiting, rash or fever.\par Has mild vag spotting\par \par 06/30/2020\par JAY BOWMAN a 54 year F is here today for follow up of FIGO IVB endometrial cancer. \par S/p 4 cycles of Keytruda.\par She feels well. Denies any new complaints.\par Denies fever, chills, SOB, vaginal bleeding, abdominal pain, distention. \par Last : 29\par CBC reviewed: H/H 13.5/39.2%.\par \par 7/21/2020\par 53 yo female accompanied by her son for follow up visit for endometrial cancer and evaluation prior to Keytruda #6.  She offers no new complaints.  \par Denies any fever, chills, SOB, vaginal bleeding, abdominal pain or distention.  Last : 35/39/68.\par CBC shows wbc=9.11, Hgb=13.8, cre=455, ANC=4.89.  H=1.39 on 6/30/2020. She was COVID tested 7/17/2020 not detected.\par \par 8/11/20\par Pt is here for follow up.\par No complaints of abd pain, N, v, D or vaginal bleeding.\par Has mild tingling of feet and finger tips.\par No fever.\par \par 9/1/20\par Pt is here for follow up.\par Tolerating Keytruda well.\par Patient denies abdominal pain or bloating, denies vaginal bleeding or discharge.\par Patient denies shortness of breath, denies fever, denies bone pain.\par Has occasional vaginal discomfort.\par \par 9/22/20\par Pt is here  for follow up.\par No new complaints, Denies abdominal pain, N, V, d\par No vag bleeding\par No SOB.\par \par 10/13/20\par Pt is here for follow up.\par No vag bleeding, N, V, D, abd pain.\par No side effects from immunotherapy\par \par 11/3/20\par Pt is here for follow up.\par No new complaints.\par No N, V, D, Abd pain.\par No vag bleeding.\par Tolerating immunotherapy well.\par \par 11/24/2020\par Patient is here today for follow up visit for endometrial cancer.  She is tolerating immunotherapy-  Keytruda well.  She offers no new complaints except for mild paresthesias.  Denies any N/V/D/C or vaginal bleeding.  Last =28.\par \par 12/15/20\par Pt is here for follow up visit.\par No complaints.\par Feels well, no abd pain, n. V, diarrhea.\par No vag bleeding.\par has not made appt for Ct scans yet\par \par 1/5/2021\par Pt is her to f/u for endometrial cancer\par No complaints of abdominal pain, or vaginal bleeding\par Feels well with good appetite\par Pt requesting  flu vaccine\par \par 1/26/21\par Pt is here for follow up. Feels well.\par Saw GYN and was offered interval debulking scheduled for 2/15\par No vag bleeding\par \par 2/16/21\par Pt is here for follow up.\par Her surgery has been rescheduled to 3/1/21\par Feels well, no abd pain, N, V, D, vag bleeding\par States her sugar levels have been running high. She will be seeing her PCP\par \par 3/23/21\par Pt is here for follow up. She is s/p surgery 08-Mar-2021 .\par She underwent Peritoneal biopsy \par Pelvic and para-aortic lymphadenectomy\par \par Robot-assisted omentectomy\par Hysterectomy, total,  with salpingo-oophorectomy \par Operative Findings:\par - Operative Findings EUA: Uterus 14w size, normal external genitalia, no gross\par lesions.\par Laparoscopy: Uterus, cervix bilateral fallopian tubes and ovaries grossly\par normal. Anterior abdominal wall nodularity likely cancer responding to\par treatment. No gross tumor visualized, upper abdomen without gross diease.\par Bilateral enlarged bulky pelvic LN 2-3cm in size.\par \par Pathology as noted below\par \par Pt is recovering well from surgery. Has Nl Bowel habits. No post op complications.\par Has been having high bld sugar, will talk to PCP\par \par 4/13/21\par pt is here for follow up and treatment. feels well.No abd pain, N, V, d\par No vag bleeding\par \par 5/7/21\par Pt is here for a follow up.\par Feels well. No new complaints.\par No abd pain, rash, vag bleeding\par No N, V, D, cough.\par \par 5/25/2021\par Pt is here to f/u for endometrial cancer.\par She is s/p Ketruda cycle #17, tolerating well.\par She feels well today, appetite is good.\par She denies abdominal pain, nausea/vomiting, or diarrhea.\par She c/o of mild tingling of finger and feet.\par \par 6/15/21\par Pt  is here for folow up.\par No abd pain, N, V, d, constipation.\par No vag bleeding\par \par 7/6/21\par Pt is here for follow up.\par Feels well. No new symptoms.\par No abdominal pain, N, V, D, vag bleeding.\par 7/27/21\par Pt is here for follow up.\par Feels well.\par No abd pain , N, V, D, vag bleeding\par \par 8/17/21\par Pt is feeling well.No abd pain, N, V, D\par \par 9/7/21\par Pt is here for follow up.\par Feels well. No n V, d.\par No abd pain.No vag bleeding.\par Has not gone for CT scans yet.\par \par 9/28/21\par Pt is here for follow up.\par Feels well. No abd pain, N, V, D, vag bleeding\par \par 11/10/2021\par Patient is here to follow up for endometrial cancer, accompanied by son.\par She is s/p Keytruda cycle #26, tolerating well.\par She feels well today, appetite is good.\par She denies nausea, vomiting, diarrhea, abdominal pain, vaginal discharge or bleeding.\par She is requesting for flu vaccine.

## 2021-12-03 NOTE — ASSESSMENT
[FreeTextEntry1] : This is a 56 year old female with PMH of DM II,  diagnosed with FIGO IVB well to moderately differentiated endometrioid cancer with focal squamous differentiation and focal clear cell changes.  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.\par \par NGS showed MSI-H and high tumor mutational burden\par Microsatellite status MSI-High \par Tumor Mutational [place] 42 Muts/Mb \par ARID1A P427fs*6\par ARID1A M1823sk*15\par ATRX I127fs*7\par CASP8 splice site 305+1G>A\par CCND1 P287A\par CDC73 E325*\par CREBBP A146fs*6\par CTCF T204fs*26\par FGFR3 V630M\par FLCN H429fs*39\par JAK1 K860fs*16\par KRAS G12D\par MET W115aac\par MSH3 K383fs*32\par NFE2L2 E82D\par PDGFRB R919W\par PIK3CA R38C\par PIK3R1 M581yig\par PTEN V166fs*14\par RNF43 G659fs*41\par TP53 D7H \par \par #FIGO IVB endometrial cancer\par - s/p 5 cycles of  Carboplatin (AUC 6) and Taxol (175 mg/m2) every 3 weeks. \par - Was switched from Carboplatin/Taxol to Keytruda.  NGS testing sent and reviewed.\par \par Although the patient had a stage IV disease at diagnosis, with neoadjuvant chemo, she got downstaged.\par Had  d/w Rad/Onc if there is any benefit for RT: no role for RT.\par \par Pt had surgery on 3/8/21. Patient had a good response to neoadjuvant treatment. Operative report showed no gross residual disease left post surgery.\par 3/5/21 Left pelvic Lns were positive for cancer, Pathologic stage pT1N1a Stage IIIC1.\par \par  has been stable.\par TSH and liver enzymes are stable.\par \par PLAN:\par Previous notes reviewed and all relevant laboratory and radiology results discussed with Dr Willard and communicated to the patient.\par \par -- Will continue cycle # 28 of Keytruda  mg once every 3 weeks until disease progression or unacceptable toxicity. CBC today is adequate. \par \par Side effects discussed including: Peripheral edema, cardiac arrhythmia, Fatigue, Pruritus, skin rash, Hyperglycemia, hypoalbuminemia, hypertriglyceridemia , hyponatremia , hypophosphatemia, hypocalcemia, decreased serum bicarbonate , hypercholesterolemia hypokalemia, hypoglycemia, hypercalcemia, hypothyroidism , hyperkalemia, Diarrhea , decreased appetite, constipation , abdominal pain , nausea, vomiting hematuria, anemia, lymphocytopenia, leukopenia, neutropenia, thrombocytopenia, hemorrhage, increased INR, prolonged partial thromboplastin time, abnormal liver enzymes.\par \par MONITORING as follows:\par Liver enzymes at baseline and periodically during treatment; renal function; thyroid function (at baseline, periodically during treatment and as clinically indicated); glucose; CBC with differential;\par signs/symptoms of colitis, dermatologic toxicity, hypophysitis, thyroid disorders, pneumonitis and infusion reactions.\par \par Treatment will be administered under my direct supervision\par \par -- Labs ordered today includes CMP, , TSH and Free T4.\par \par RTC in 3 weeks.\par \par Case was seen and discussed with Dr. Willard who agreed with assessment and plan.\par \par \par

## 2021-12-03 NOTE — CONSULT LETTER
[Dear  ___] : Dear  [unfilled], [Courtesy Letter:] : I had the pleasure of seeing your patient, [unfilled], in my office today. [Please see my note below.] : Please see my note below. [Consult Closing:] : Thank you very much for allowing me to participate in the care of this patient.  If you have any questions, please do not hesitate to contact me. [Sincerely,] : Sincerely, [DrShabbir  ___] : Dr. THAKUR [FreeTextEntry3] : Demetrice Willard MD\par Professor [employer]\par Adam/Dorian\par Attending Physician\par [facility] [place]\par [phone]\par \par

## 2021-12-23 ENCOUNTER — APPOINTMENT (OUTPATIENT)
Dept: INFUSION THERAPY | Facility: CLINIC | Age: 56
End: 2021-12-23

## 2021-12-23 ENCOUNTER — LABORATORY RESULT (OUTPATIENT)
Age: 56
End: 2021-12-23

## 2021-12-23 LAB
HCT VFR BLD CALC: 39.3 %
HGB BLD-MCNC: 13.5 G/DL
MCHC RBC-ENTMCNC: 29.2 PG
MCHC RBC-ENTMCNC: 34.4 G/DL
MCV RBC AUTO: 84.9 FL
PLATELET # BLD AUTO: 307 K/UL
PMV BLD: 11.1 FL
RBC # BLD: 4.63 M/UL
RBC # FLD: 14 %
WBC # FLD AUTO: 9.27 K/UL

## 2021-12-23 RX ORDER — PEMBROLIZUMAB 25 MG/ML
200 INJECTION, SOLUTION INTRAVENOUS ONCE
Refills: 0 | Status: COMPLETED | OUTPATIENT
Start: 2021-12-23 | End: 2021-12-23

## 2021-12-23 RX ADMIN — PEMBROLIZUMAB 216 MILLIGRAM(S): 25 INJECTION, SOLUTION INTRAVENOUS at 14:26

## 2021-12-25 LAB
ALBUMIN SERPL ELPH-MCNC: 4.6 G/DL
ALP BLD-CCNC: 61 U/L
ALT SERPL-CCNC: 14 U/L
ANION GAP SERPL CALC-SCNC: 17 MMOL/L
AST SERPL-CCNC: 15 U/L
BILIRUB SERPL-MCNC: 0.3 MG/DL
BUN SERPL-MCNC: 15 MG/DL
CALCIUM SERPL-MCNC: 9.6 MG/DL
CHLORIDE SERPL-SCNC: 100 MMOL/L
CO2 SERPL-SCNC: 19 MMOL/L
CREAT SERPL-MCNC: 0.5 MG/DL
GLUCOSE SERPL-MCNC: 123 MG/DL
POTASSIUM SERPL-SCNC: 5 MMOL/L
PROT SERPL-MCNC: 8.1 G/DL
SODIUM SERPL-SCNC: 136 MMOL/L
TSH SERPL-ACNC: 1.96 UIU/ML

## 2022-01-13 ENCOUNTER — APPOINTMENT (OUTPATIENT)
Dept: HEMATOLOGY ONCOLOGY | Facility: CLINIC | Age: 57
End: 2022-01-13

## 2022-01-13 ENCOUNTER — APPOINTMENT (OUTPATIENT)
Dept: INFUSION THERAPY | Facility: CLINIC | Age: 57
End: 2022-01-13

## 2022-01-18 ENCOUNTER — APPOINTMENT (OUTPATIENT)
Dept: OPHTHALMOLOGY | Facility: CLINIC | Age: 57
End: 2022-01-18

## 2022-01-20 ENCOUNTER — APPOINTMENT (OUTPATIENT)
Dept: INFUSION THERAPY | Facility: CLINIC | Age: 57
End: 2022-01-20
Payer: MEDICAID

## 2022-01-20 ENCOUNTER — LABORATORY RESULT (OUTPATIENT)
Age: 57
End: 2022-01-20

## 2022-01-20 ENCOUNTER — APPOINTMENT (OUTPATIENT)
Dept: HEMATOLOGY ONCOLOGY | Facility: CLINIC | Age: 57
End: 2022-01-20
Payer: MEDICAID

## 2022-01-20 VITALS
BODY MASS INDEX: 24.74 KG/M2 | HEART RATE: 67 BPM | SYSTOLIC BLOOD PRESSURE: 107 MMHG | WEIGHT: 126 LBS | TEMPERATURE: 98.1 F | HEIGHT: 60 IN | DIASTOLIC BLOOD PRESSURE: 77 MMHG | RESPIRATION RATE: 14 BRPM

## 2022-01-20 LAB
HCT VFR BLD CALC: 39.5 %
HGB BLD-MCNC: 13.8 G/DL
MCHC RBC-ENTMCNC: 29.4 PG
MCHC RBC-ENTMCNC: 34.9 G/DL
MCV RBC AUTO: 84 FL
PLATELET # BLD AUTO: 173 K/UL
PMV BLD: 11.7 FL
RBC # BLD: 4.7 M/UL
RBC # FLD: 14.4 %
WBC # FLD AUTO: 9.75 K/UL

## 2022-01-20 PROCEDURE — 99215 OFFICE O/P EST HI 40 MIN: CPT

## 2022-01-20 RX ORDER — PEMBROLIZUMAB 25 MG/ML
200 INJECTION, SOLUTION INTRAVENOUS ONCE
Refills: 0 | Status: COMPLETED | OUTPATIENT
Start: 2022-01-20 | End: 2022-01-20

## 2022-01-20 RX ADMIN — PEMBROLIZUMAB 216 MILLIGRAM(S): 25 INJECTION, SOLUTION INTRAVENOUS at 15:30

## 2022-01-20 NOTE — ASSESSMENT
[FreeTextEntry1] : This is a 56 year old female with PMH of DM II,  diagnosed with FIGO IVB well to moderately differentiated endometrioid cancer with focal squamous differentiation and focal clear cell changes.  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.\par \par NGS showed MSI-H and high tumor mutational burden\par Microsatellite status MSI-High \par Tumor Mutational [place] 42 Muts/Mb \par ARID1A P427fs*6\par ARID1A T4011ct*15\par ATRX I127fs*7\par CASP8 splice site 305+1G>A\par CCND1 P287A\par CDC73 E325*\par CREBBP A146fs*6\par CTCF T204fs*26\par FGFR3 V630M\par FLCN H429fs*39\par JAK1 K860fs*16\par KRAS G12D\par MET F430mzj\par MSH3 K383fs*32\par NFE2L2 E82D\par PDGFRB R919W\par PIK3CA R38C\par PIK3R1 Y865ghg\par PTEN V166fs*14\par RNF43 G659fs*41\par TP53 D7H \par \par #FIGO IVB endometrial cancer\par - s/p 5 cycles of  Carboplatin (AUC 6) and Taxol (175 mg/m2) every 3 weeks. \par - Was switched from Carboplatin/Taxol to Keytruda.  NGS testing sent and reviewed.\par \par Although the patient had a stage IV disease at diagnosis, with neoadjuvant chemo, she got downstaged.\par Had  d/w Rad/Onc if there is any benefit for RT: no role for RT.\par \par Pt had surgery on 3/8/21. Patient had a good response to neoadjuvant treatment. Operative report showed no gross residual disease left post surgery.\par 3/5/21 Left pelvic Lns were positive for cancer, Pathologic stage pT1N1a Stage IIIC1.\par \par  has been stable.\par TSH and liver enzymes are stable.\par \par PLAN:\par Previous notes reviewed and all relevant laboratory and radiology results discussed with Dr Willard and communicated to the patient.\par \par -- Will continue cycle # 29 of Keytruda  mg once every 3 weeks until disease progression or unacceptable toxicity. CBC today is adequate. \par \par Side effects discussed including: Peripheral edema, cardiac arrhythmia, Fatigue, Pruritus, skin rash, Hyperglycemia, hypoalbuminemia, hypertriglyceridemia , hyponatremia , hypophosphatemia, hypocalcemia, decreased serum bicarbonate , hypercholesterolemia hypokalemia, hypoglycemia, hypercalcemia, hypothyroidism , hyperkalemia, Diarrhea , decreased appetite, constipation , abdominal pain , nausea, vomiting hematuria, anemia, lymphocytopenia, leukopenia, neutropenia, thrombocytopenia, hemorrhage, increased INR, prolonged partial thromboplastin time, abnormal liver enzymes.\par \par MONITORING as follows:\par Liver enzymes at baseline and periodically during treatment; renal function; thyroid function (at baseline, periodically during treatment and as clinically indicated); glucose; CBC with differential;\par signs/symptoms of colitis, dermatologic toxicity, hypophysitis, thyroid disorders, pneumonitis and infusion reactions.\par \par Treatment will be administered under my direct supervision\par \par -- Labs ordered today includes CMP, , TSH and Free T4.\par Pt to rpt CT scans prior to next follow up to assess disease status\par \par RTC in 3 weeks.\par \par \par \par

## 2022-01-20 NOTE — CONSULT LETTER
[Dear  ___] : Dear  [unfilled], [Courtesy Letter:] : I had the pleasure of seeing your patient, [unfilled], in my office today. [Please see my note below.] : Please see my note below. [Consult Closing:] : Thank you very much for allowing me to participate in the care of this patient.  If you have any questions, please do not hesitate to contact me. [Sincerely,] : Sincerely, [DrShabbir  ___] : Dr. THAKUR [FreeTextEntry3] : Demetrice Willard MD\par Professor [employer]\par Adam/Dorian\par Attending Physician\par [facility] [place]\par [phone]\par \par

## 2022-01-20 NOTE — HISTORY OF PRESENT ILLNESS
[de-identified] : 54 yof, PMH of DM II, presented to ED in 10/2019 after experiencing worsening of chronic, intermittent pelvic pain and vaginal bleeding.  Patient underwent transvaginal ultrasound, which showed 3.5 x 2.7 x 4.4 cm cervical mass.  CT abdomen/pelvis revealed R iliac chain Ln 1.5 x 1.4 cm, along with 2.2 x 1.8 cm portacaval LNs, and enlargement of uterus.  Endometrial biopsy revealed well to moderately differentiated endometrioid cancer with focal squamous differentiation and focal clear cell changes.  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   CT chest negative for evidence of intrathoracic metastases.  Patient was initially scheduled for ALESHA/BSO on 11/25/2019.  However, MRI pelvis revealed L adnexal peritoneal tumor implants, enlarged pelvic and retroperitoneal lymph nodes, FIGO stage IVB.  ALESHA/BSO was subsequently canceled.  Patient recently saw Dr. Perez, who recommended chemotherapy alone.  Patient is currently only having occasional, mild vaginal bleeding and intermittent pelvic pain, which responds to tylenol.\par \par Family history is significant for history of uterine cancer in paternal grandmother (per patient, diagnosed at around 55 years) and "blood cancer" in maternal grandmother.\par \par Patient lives with her family and is fully functional at baseline.  She used to work as a  but is now no longer working.  She is also is in the process of applying for Medicaid. [de-identified] : Microsatellite status MSI-High §\par Tumor Mutational [place] 42 Muts/Mb §\par ARID1A P427fs*6\par ARID1A B3047mm*15\par ATRX I127fs*7\par CASP8 splice site 305+1G>A\par CCND1 P287A\par CDC73 E325*\par CREBBP A146fs*6\par CTCF T204fs*26\par FGFR3 V630M\par FLCN H429fs*39\par JAK1 K860fs*16\par KRAS G12D\par MET D263xuz\par MSH3 K383fs*32\par NFE2L2 E82D\par PDGFRB R919W\par PIK3CA R38C\par PIK3R1 I798auy\par PTEN V166fs*14\par RNF43 G659fs*41\par TP53 D7H  [de-identified] : 1/20/20 : The patient is here for follow up .She received 2 doses of ferraheme prior to chemo . She tolerated carbo, taxol with mild symptoms of nausea and vomiting. She lost her hair , and reported mild numbness of fingers. She reported also minimal vaginal bleed. Her abdominal pain resolved and she is able to ambulate with no problems\par \par 2/10/2020: The patient is here for follow up , she tolerated her second cycle of chemotherapy with no problems , except for mild nausea and vomiting for the first few days. She also reported some mucousy discharge from vagina. She is eating well , no abdominal pain. \par \par 3/2/2020: The patient is here for follow up . She is tolerating chemotherapy with no major side effects except for nausea in the following days.  She had a CT C/A/P after her 3rd cycle which showed mixed response. She denies any vaginal bleed, no rectal pain on defecation, no abdominal pain. \par \par \par \par 3/23/2020: The patient is here for follow up. She is tolerating chemotherapy with no major side effects . She is reporting nausea in the following days after chemotherapy. She denies fever, chills, abdominal pain , no constipation or diarrhea . No vaginal bleeding\par \par 04/07/2020\par JAY BOWMAN a 54 year F is here today for follow up of FIGO IVB endometrial cancer.\par Had  Ronnie number [id number].\par S/p 4 cycles of carbo and taxol, tolerating well. Cancelled last chemo appt due to corona virus. \par Reports lower abdominal cramping 2/10 started today. Denies vaginal bleeding today. Did have spotting x 1 since last treatment.\par Denies NVD, fever, chills. \par Pt reports peripheral neuropathy in fingers b/l not bothersome \par Had GYN f/up 3 weeks ago. \par CBC reviewed: WBC 8.6, h/H 12.8/36.9%, , Neutrophils 5.59\par \par 4/28/20: \par History obtained through  [facility] from [facility]. ID# [id number]\par Pt returns for a f/u vist. She is S/P 1st cycle of keytruda. She tolerated it fairly well. Abdominal pain has resolved. She continues to have mild yellowish vaginal discharge. No vaginal bleeding.\par \par 05/19/2020\par JAY BOWMAN a 54 year F is here today with son for follow up of FIGO IVB endometrial cancer. \par Pt prefers her son translates today's visit. \par Pt denies abdominal pain, vaginal bleeding, NVD, fever, chills. \par She is s/p 2nd cycle of Keytruda, tolerated well. \par  : 31\par \par 6/9/20\par Pt is here for follow up.\par No new complaints.\par Feels well. Denies any abd pain, nausea, vomiting, rash or fever.\par Has mild vag spotting\par \par 06/30/2020\par JAY BOWMAN a 54 year F is here today for follow up of FIGO IVB endometrial cancer. \par S/p 4 cycles of Keytruda.\par She feels well. Denies any new complaints.\par Denies fever, chills, SOB, vaginal bleeding, abdominal pain, distention. \par Last : 29\par CBC reviewed: H/H 13.5/39.2%.\par \par 7/21/2020\par 55 yo female accompanied by her son for follow up visit for endometrial cancer and evaluation prior to Keytruda #6.  She offers no new complaints.  \par Denies any fever, chills, SOB, vaginal bleeding, abdominal pain or distention.  Last : 35/39/68.\par CBC shows wbc=9.11, Hgb=13.8, xha=003, ANC=4.89.  H=1.39 on 6/30/2020. She was COVID tested 7/17/2020 not detected.\par \par 8/11/20\par Pt is here for follow up.\par No complaints of abd pain, N, v, D or vaginal bleeding.\par Has mild tingling of feet and finger tips.\par No fever.\par \par 9/1/20\par Pt is here for follow up.\par Tolerating Keytruda well.\par Patient denies abdominal pain or bloating, denies vaginal bleeding or discharge.\par Patient denies shortness of breath, denies fever, denies bone pain.\par Has occasional vaginal discomfort.\par \par 9/22/20\par Pt is here  for follow up.\par No new complaints, Denies abdominal pain, N, V, d\par No vag bleeding\par No SOB.\par \par 10/13/20\par Pt is here for follow up.\par No vag bleeding, N, V, D, abd pain.\par No side effects from immunotherapy\par \par 11/3/20\par Pt is here for follow up.\par No new complaints.\par No N, V, D, Abd pain.\par No vag bleeding.\par Tolerating immunotherapy well.\par \par 11/24/2020\par Patient is here today for follow up visit for endometrial cancer.  She is tolerating immunotherapy-  Keytruda well.  She offers no new complaints except for mild paresthesias.  Denies any N/V/D/C or vaginal bleeding.  Last =28.\par \par 12/15/20\par Pt is here for follow up visit.\par No complaints.\par Feels well, no abd pain, n. V, diarrhea.\par No vag bleeding.\par has not made appt for Ct scans yet\par \par 1/5/2021\par Pt is her to f/u for endometrial cancer\par No complaints of abdominal pain, or vaginal bleeding\par Feels well with good appetite\par Pt requesting  flu vaccine\par \par 1/26/21\par Pt is here for follow up. Feels well.\par Saw GYN and was offered interval debulking scheduled for 2/15\par No vag bleeding\par \par 2/16/21\par Pt is here for follow up.\par Her surgery has been rescheduled to 3/1/21\par Feels well, no abd pain, N, V, D, vag bleeding\par States her sugar levels have been running high. She will be seeing her PCP\par \par 3/23/21\par Pt is here for follow up. She is s/p surgery 08-Mar-2021 .\par She underwent Peritoneal biopsy \par Pelvic and para-aortic lymphadenectomy\par \par Robot-assisted omentectomy\par Hysterectomy, total,  with salpingo-oophorectomy \par Operative Findings:\par - Operative Findings EUA: Uterus 14w size, normal external genitalia, no gross\par lesions.\par Laparoscopy: Uterus, cervix bilateral fallopian tubes and ovaries grossly\par normal. Anterior abdominal wall nodularity likely cancer responding to\par treatment. No gross tumor visualized, upper abdomen without gross diease.\par Bilateral enlarged bulky pelvic LN 2-3cm in size.\par \par Pathology as noted below\par \par Pt is recovering well from surgery. Has Nl Bowel habits. No post op complications.\par Has been having high bld sugar, will talk to PCP\par \par 4/13/21\par pt is here for follow up and treatment. feels well.No abd pain, N, V, d\par No vag bleeding\par \par 5/7/21\par Pt is here for a follow up.\par Feels well. No new complaints.\par No abd pain, rash, vag bleeding\par No N, V, D, cough.\par \par 5/25/2021\par Pt is here to f/u for endometrial cancer.\par She is s/p Ketruda cycle #17, tolerating well.\par She feels well today, appetite is good.\par She denies abdominal pain, nausea/vomiting, or diarrhea.\par She c/o of mild tingling of finger and feet.\par \par 6/15/21\par Pt  is here for folow up.\par No abd pain, N, V, d, constipation.\par No vag bleeding\par \par 7/6/21\par Pt is here for follow up.\par Feels well. No new symptoms.\par No abdominal pain, N, V, D, vag bleeding.\par 7/27/21\par Pt is here for follow up.\par Feels well.\par No abd pain , N, V, D, vag bleeding\par \par 8/17/21\par Pt is feeling well.No abd pain, N, V, D\par \par 9/7/21\par Pt is here for follow up.\par Feels well. No n V, d.\par No abd pain.No vag bleeding.\par Has not gone for CT scans yet.\par \par 9/28/21\par Pt is here for follow up.\par Feels well. No abd pain, N, V, D, vag bleeding\par \par 11/10/2021\par Patient is here to follow up for endometrial cancer, accompanied by son.\par She is s/p Keytruda cycle #26, tolerating well.\par She feels well today, appetite is good.\par She denies nausea, vomiting, diarrhea, abdominal pain, vaginal discharge or bleeding.\par She is requesting for flu vaccine.\par \par 1/20/22\par Pt is here for follow up \par Feels well. No abd pain, N, V, D\par No vag bleeding

## 2022-01-24 LAB
ALBUMIN SERPL ELPH-MCNC: 4.4 G/DL
ALP BLD-CCNC: 62 U/L
ALT SERPL-CCNC: 18 U/L
ANION GAP SERPL CALC-SCNC: 14 MMOL/L
AST SERPL-CCNC: 18 U/L
BILIRUB SERPL-MCNC: 0.3 MG/DL
BUN SERPL-MCNC: 10 MG/DL
CALCIUM SERPL-MCNC: 9.7 MG/DL
CHLORIDE SERPL-SCNC: 99 MMOL/L
CO2 SERPL-SCNC: 20 MMOL/L
CREAT SERPL-MCNC: 0.6 MG/DL
GLUCOSE SERPL-MCNC: 275 MG/DL
POTASSIUM SERPL-SCNC: 4.2 MMOL/L
PROT SERPL-MCNC: 7.4 G/DL
SODIUM SERPL-SCNC: 133 MMOL/L
TSH SERPL-ACNC: 1.17 UIU/ML

## 2022-02-09 ENCOUNTER — APPOINTMENT (OUTPATIENT)
Dept: INFUSION THERAPY | Facility: CLINIC | Age: 57
End: 2022-02-09
Payer: MEDICAID

## 2022-02-09 ENCOUNTER — LABORATORY RESULT (OUTPATIENT)
Age: 57
End: 2022-02-09

## 2022-02-09 ENCOUNTER — RESULT REVIEW (OUTPATIENT)
Age: 57
End: 2022-02-09

## 2022-02-09 ENCOUNTER — OUTPATIENT (OUTPATIENT)
Dept: OUTPATIENT SERVICES | Facility: HOSPITAL | Age: 57
LOS: 1 days | Discharge: HOME | End: 2022-02-09
Payer: MEDICAID

## 2022-02-09 ENCOUNTER — APPOINTMENT (OUTPATIENT)
Dept: HEMATOLOGY ONCOLOGY | Facility: CLINIC | Age: 57
End: 2022-02-09
Payer: MEDICAID

## 2022-02-09 ENCOUNTER — OUTPATIENT (OUTPATIENT)
Dept: OUTPATIENT SERVICES | Facility: HOSPITAL | Age: 57
LOS: 1 days | Discharge: HOME | End: 2022-02-09

## 2022-02-09 VITALS
HEART RATE: 70 BPM | WEIGHT: 128 LBS | TEMPERATURE: 98.2 F | BODY MASS INDEX: 25.13 KG/M2 | DIASTOLIC BLOOD PRESSURE: 71 MMHG | SYSTOLIC BLOOD PRESSURE: 101 MMHG | HEIGHT: 60 IN

## 2022-02-09 DIAGNOSIS — R10.9 UNSPECIFIED ABDOMINAL PAIN: ICD-10-CM

## 2022-02-09 DIAGNOSIS — R10.2 PELVIC AND PERINEAL PAIN: ICD-10-CM

## 2022-02-09 DIAGNOSIS — C54.1 MALIGNANT NEOPLASM OF ENDOMETRIUM: ICD-10-CM

## 2022-02-09 LAB
HCT VFR BLD CALC: 40.5 %
HGB BLD-MCNC: 14 G/DL
MCHC RBC-ENTMCNC: 29.2 PG
MCHC RBC-ENTMCNC: 34.6 G/DL
MCV RBC AUTO: 84.6 FL
PLATELET # BLD AUTO: 155 K/UL
PMV BLD: 12.2 FL
RBC # BLD: 4.79 M/UL
RBC # FLD: 14.2 %
WBC # FLD AUTO: 9.91 K/UL

## 2022-02-09 PROCEDURE — 71260 CT THORAX DX C+: CPT | Mod: 26

## 2022-02-09 PROCEDURE — 99215 OFFICE O/P EST HI 40 MIN: CPT

## 2022-02-09 PROCEDURE — 74177 CT ABD & PELVIS W/CONTRAST: CPT | Mod: 26

## 2022-02-09 RX ORDER — PEMBROLIZUMAB 25 MG/ML
200 INJECTION, SOLUTION INTRAVENOUS ONCE
Refills: 0 | Status: COMPLETED | OUTPATIENT
Start: 2022-02-09 | End: 2022-02-09

## 2022-02-09 RX ADMIN — PEMBROLIZUMAB 216 MILLIGRAM(S): 25 INJECTION, SOLUTION INTRAVENOUS at 13:17

## 2022-02-10 DIAGNOSIS — C54.1 MALIGNANT NEOPLASM OF ENDOMETRIUM: ICD-10-CM

## 2022-02-10 DIAGNOSIS — Z51.12 ENCOUNTER FOR ANTINEOPLASTIC IMMUNOTHERAPY: ICD-10-CM

## 2022-02-10 LAB
ALBUMIN SERPL ELPH-MCNC: 4.4 G/DL
ALP BLD-CCNC: 66 U/L
ALT SERPL-CCNC: 17 U/L
ANION GAP SERPL CALC-SCNC: 13 MMOL/L
AST SERPL-CCNC: 21 U/L
BILIRUB SERPL-MCNC: 0.3 MG/DL
BUN SERPL-MCNC: 15 MG/DL
CALCIUM SERPL-MCNC: 9.8 MG/DL
CANCER AG125 SERPL-ACNC: 15 U/ML
CHLORIDE SERPL-SCNC: 98 MMOL/L
CO2 SERPL-SCNC: 25 MMOL/L
CREAT SERPL-MCNC: 0.5 MG/DL
GLUCOSE SERPL-MCNC: 178 MG/DL
POTASSIUM SERPL-SCNC: 5.1 MMOL/L
PROT SERPL-MCNC: 7.6 G/DL
SODIUM SERPL-SCNC: 136 MMOL/L
T4 FREE SERPL-MCNC: 1.2 NG/DL
TSH SERPL-ACNC: 1.36 UIU/ML

## 2022-02-11 NOTE — CONSULT LETTER
[Dear  ___] : Dear  [unfilled], [Courtesy Letter:] : I had the pleasure of seeing your patient, [unfilled], in my office today. [Please see my note below.] : Please see my note below. [Consult Closing:] : Thank you very much for allowing me to participate in the care of this patient.  If you have any questions, please do not hesitate to contact me. [Sincerely,] : Sincerely, [DrShabbir  ___] : Dr. THAKUR [FreeTextEntry3] : Demetrice Willard MD\par Professor [employer]\par Adam/Dorian\par Attending Physician\par [facility]\par [phone]\par \par

## 2022-02-11 NOTE — ASSESSMENT
[FreeTextEntry1] : This is a 56 year old female with PMH of DM II,  diagnosed with FIGO IVB well to moderately differentiated endometrioid cancer with focal squamous differentiation and focal clear cell changes.  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.\par \par NGS showed MSI-H and high tumor mutational burden\par Microsatellite status MSI-High \par Tumor Mutational [place] 42 Muts/Mb \par ARID1A P427fs*6\par ARID1A Q4130jy*15\par ATRX I127fs*7\par CASP8 splice site 305+1G>A\par CCND1 P287A\par CDC73 E325*\par CREBBP A146fs*6\par CTCF T204fs*26\par FGFR3 V630M\par FLCN H429fs*39\par JAK1 K860fs*16\par KRAS G12D\par MET G342vdq\par MSH3 K383fs*32\par NFE2L2 E82D\par PDGFRB R919W\par PIK3CA R38C\par PIK3R1 B304tkx\par PTEN V166fs*14\par RNF43 G659fs*41\par TP53 D7H \par \par #FIGO IVB endometrial cancer\par - s/p 5 cycles of  Carboplatin (AUC 6) and Taxol (175 mg/m2) every 3 weeks. \par - Was switched from Carboplatin/Taxol to Keytruda.  NGS testing sent and reviewed.\par \par Although the patient had a stage IV disease at diagnosis, with neoadjuvant chemo, she got downstaged.\par Had  d/w Rad/Onc if there is any benefit for RT: no role for RT.\par \par Pt had surgery on 3/8/21. Patient had a good response to neoadjuvant treatment. Operative report showed no gross residual disease left post surgery.\par 3/5/21 Left pelvic Lns were positive for cancer, Pathologic stage pT1N1a Stage IIIC1.\par \par  has been stable.\par TSH and liver enzymes are stable.\par \par PLAN:\par Previous notes reviewed and all relevant laboratory and radiology results discussed with and communicated to the patient.\par \par -- Will continue cycle # 30 of Keytruda  mg once every 3 weeks until disease progression or unacceptable toxicity. CBC today is adequate. \par \par Side effects discussed including: Peripheral edema, cardiac arrhythmia, Fatigue, Pruritus, skin rash, Hyperglycemia, hypoalbuminemia, hypertriglyceridemia , hyponatremia , hypophosphatemia, hypocalcemia, decreased serum bicarbonate , hypercholesterolemia hypokalemia, hypoglycemia, hypercalcemia, hypothyroidism , hyperkalemia, Diarrhea , decreased appetite, constipation , abdominal pain , nausea, vomiting hematuria, anemia, lymphocytopenia, leukopenia, neutropenia, thrombocytopenia, hemorrhage, increased INR, prolonged partial thromboplastin time, abnormal liver enzymes.\par \par MONITORING as follows:\par Liver enzymes at baseline and periodically during treatment; renal function; thyroid function (at baseline, periodically during treatment and as clinically indicated); glucose; CBC with differential;\par signs/symptoms of colitis, dermatologic toxicity, hypophysitis, thyroid disorders, pneumonitis and infusion reactions.\par \par Treatment will be administered under my direct supervision\par \par -- Labs ordered today includes CMP, , TSH and Free T4.\par Pt to rpt CT scans  scheduled today\par RTC in 3 weeks.\par \par \par \par

## 2022-02-11 NOTE — HISTORY OF PRESENT ILLNESS
[de-identified] : 54 yof, PMH of DM II, presented to ED in 10/2019 after experiencing worsening of chronic, intermittent pelvic pain and vaginal bleeding.  Patient underwent transvaginal ultrasound, which showed 3.5 x 2.7 x 4.4 cm cervical mass.  CT abdomen/pelvis revealed R iliac chain Ln 1.5 x 1.4 cm, along with 2.2 x 1.8 cm portacaval LNs, and enlargement of uterus.  Endometrial biopsy revealed well to moderately differentiated endometrioid cancer with focal squamous differentiation and focal clear cell changes.  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   CT chest negative for evidence of intrathoracic metastases.  Patient was initially scheduled for ALESHA/BSO on 11/25/2019.  However, MRI pelvis revealed L adnexal peritoneal tumor implants, enlarged pelvic and retroperitoneal lymph nodes, FIGO stage IVB.  ALESHA/BSO was subsequently canceled.  Patient recently saw Dr. Perez, who recommended chemotherapy alone.  Patient is currently only having occasional, mild vaginal bleeding and intermittent pelvic pain, which responds to tylenol.\par \par Family history is significant for history of uterine cancer in paternal grandmother (per patient, diagnosed at around 55 years) and "blood cancer" in maternal grandmother.\par \par Patient lives with her family and is fully functional at baseline.  She used to work as a  but is now no longer working.  She is also is in the process of applying for Medicaid. [de-identified] : Microsatellite status MSI-High §\par Tumor Mutational [place] 42 Muts/Mb §\par ARID1A P427fs*6\par ARID1A L7397uh*15\par ATRX I127fs*7\par CASP8 splice site 305+1G>A\par CCND1 P287A\par CDC73 E325*\par CREBBP A146fs*6\par CTCF T204fs*26\par FGFR3 V630M\par FLCN H429fs*39\par JAK1 K860fs*16\par KRAS G12D\par MET N408lfv\par MSH3 K383fs*32\par NFE2L2 E82D\par PDGFRB R919W\par PIK3CA R38C\par PIK3R1 P799tbc\par PTEN V166fs*14\par RNF43 G659fs*41\par TP53 D7H  [de-identified] : 1/20/20 : The patient is here for follow up .She received 2 doses of ferraheme prior to chemo . She tolerated carbo, taxol with mild symptoms of nausea and vomiting. She lost her hair , and reported mild numbness of fingers. She reported also minimal vaginal bleed. Her abdominal pain resolved and she is able to ambulate with no problems\par \par 2/10/2020: The patient is here for follow up , she tolerated her second cycle of chemotherapy with no problems , except for mild nausea and vomiting for the first few days. She also reported some mucousy discharge from vagina. She is eating well , no abdominal pain. \par \par 3/2/2020: The patient is here for follow up . She is tolerating chemotherapy with no major side effects except for nausea in the following days.  She had a CT C/A/P after her 3rd cycle which showed mixed response. She denies any vaginal bleed, no rectal pain on defecation, no abdominal pain. \par \par \par \par 3/23/2020: The patient is here for follow up. She is tolerating chemotherapy with no major side effects . She is reporting nausea in the following days after chemotherapy. She denies fever, chills, abdominal pain , no constipation or diarrhea . No vaginal bleeding\par \par 04/07/2020\par JAY BOWMAN a 54 year F is here today for follow up of FIGO IVB endometrial cancer.\par Had  Ronnie number [id number].\par S/p 4 cycles of carbo and taxol, tolerating well. Cancelled last chemo appt due to corona virus. \par Reports lower abdominal cramping 2/10 started today. Denies vaginal bleeding today. Did have spotting x 1 since last treatment.\par Denies NVD, fever, chills. \par Pt reports peripheral neuropathy in fingers b/l not bothersome \par Had GYN f/up 3 weeks ago. \par CBC reviewed: WBC 8.6, h/H 12.8/36.9%, , Neutrophils 5.59\par \par 4/28/20: \par History obtained through  [facility] from [facility]. ID# [id number]\par Pt returns for a f/u vist. She is S/P 1st cycle of keytruda. She tolerated it fairly well. Abdominal pain has resolved. She continues to have mild yellowish vaginal discharge. No vaginal bleeding.\par \par 05/19/2020\par JAY BOWMAN a 54 year F is here today with son for follow up of FIGO IVB endometrial cancer. \par Pt prefers her son translates today's visit. \par Pt denies abdominal pain, vaginal bleeding, NVD, fever, chills. \par She is s/p 2nd cycle of Keytruda, tolerated well. \par  : 31\par \par 6/9/20\par Pt is here for follow up.\par No new complaints.\par Feels well. Denies any abd pain, nausea, vomiting, rash or fever.\par Has mild vag spotting\par \par 06/30/2020\par JAY BOWMAN a 54 year F is here today for follow up of FIGO IVB endometrial cancer. \par S/p 4 cycles of Keytruda.\par She feels well. Denies any new complaints.\par Denies fever, chills, SOB, vaginal bleeding, abdominal pain, distention. \par Last : 29\par CBC reviewed: H/H 13.5/39.2%.\par \par 7/21/2020\par 53 yo female accompanied by her son for follow up visit for endometrial cancer and evaluation prior to Keytruda #6.  She offers no new complaints.  \par Denies any fever, chills, SOB, vaginal bleeding, abdominal pain or distention.  Last : 35/39/68.\par CBC shows wbc=9.11, Hgb=13.8, iwb=992, ANC=4.89.  H=1.39 on 6/30/2020. She was COVID tested 7/17/2020 not detected.\par \par 8/11/20\par Pt is here for follow up.\par No complaints of abd pain, N, v, D or vaginal bleeding.\par Has mild tingling of feet and finger tips.\par No fever.\par \par 9/1/20\par Pt is here for follow up.\par Tolerating Keytruda well.\par Patient denies abdominal pain or bloating, denies vaginal bleeding or discharge.\par Patient denies shortness of breath, denies fever, denies bone pain.\par Has occasional vaginal discomfort.\par \par 9/22/20\par Pt is here  for follow up.\par No new complaints, Denies abdominal pain, N, V, d\par No vag bleeding\par No SOB.\par \par 10/13/20\par Pt is here for follow up.\par No vag bleeding, N, V, D, abd pain.\par No side effects from immunotherapy\par \par 11/3/20\par Pt is here for follow up.\par No new complaints.\par No N, V, D, Abd pain.\par No vag bleeding.\par Tolerating immunotherapy well.\par \par 11/24/2020\par Patient is here today for follow up visit for endometrial cancer.  She is tolerating immunotherapy-  Keytruda well.  She offers no new complaints except for mild paresthesias.  Denies any N/V/D/C or vaginal bleeding.  Last =28.\par \par 12/15/20\par Pt is here for follow up visit.\par No complaints.\par Feels well, no abd pain, n. V, diarrhea.\par No vag bleeding.\par has not made appt for Ct scans yet\par \par 1/5/2021\par Pt is her to f/u for endometrial cancer\par No complaints of abdominal pain, or vaginal bleeding\par Feels well with good appetite\par Pt requesting  flu vaccine\par \par 1/26/21\par Pt is here for follow up. Feels well.\par Saw GYN and was offered interval debulking scheduled for 2/15\par No vag bleeding\par \par 2/16/21\par Pt is here for follow up.\par Her surgery has been rescheduled to 3/1/21\par Feels well, no abd pain, N, V, D, vag bleeding\par States her sugar levels have been running high. She will be seeing her PCP\par \par 3/23/21\par Pt is here for follow up. She is s/p surgery 08-Mar-2021 .\par She underwent Peritoneal biopsy \par Pelvic and para-aortic lymphadenectomy\par \par Robot-assisted omentectomy\par Hysterectomy, total,  with salpingo-oophorectomy \par Operative Findings:\par - Operative Findings EUA: Uterus 14w size, normal external genitalia, no gross\par lesions.\par Laparoscopy: Uterus, cervix bilateral fallopian tubes and ovaries grossly\par normal. Anterior abdominal wall nodularity likely cancer responding to\par treatment. No gross tumor visualized, upper abdomen without gross diease.\par Bilateral enlarged bulky pelvic LN 2-3cm in size.\par \par Pathology as noted below\par \par Pt is recovering well from surgery. Has Nl Bowel habits. No post op complications.\par Has been having high bld sugar, will talk to PCP\par \par 4/13/21\par pt is here for follow up and treatment. feels well.No abd pain, N, V, d\par No vag bleeding\par \par 5/7/21\par Pt is here for a follow up.\par Feels well. No new complaints.\par No abd pain, rash, vag bleeding\par No N, V, D, cough.\par \par 5/25/2021\par Pt is here to f/u for endometrial cancer.\par She is s/p Ketruda cycle #17, tolerating well.\par She feels well today, appetite is good.\par She denies abdominal pain, nausea/vomiting, or diarrhea.\par She c/o of mild tingling of finger and feet.\par \par 6/15/21\par Pt  is here for folow up.\par No abd pain, N, V, d, constipation.\par No vag bleeding\par \par 7/6/21\par Pt is here for follow up.\par Feels well. No new symptoms.\par No abdominal pain, N, V, D, vag bleeding.\par 7/27/21\par Pt is here for follow up.\par Feels well.\par No abd pain , N, V, D, vag bleeding\par \par 8/17/21\par Pt is feeling well.No abd pain, N, V, D\par \par 9/7/21\par Pt is here for follow up.\par Feels well. No n V, d.\par No abd pain.No vag bleeding.\par Has not gone for CT scans yet.\par \par 9/28/21\par Pt is here for follow up.\par Feels well. No abd pain, N, V, D, vag bleeding\par \par 11/10/2021\par Patient is here to follow up for endometrial cancer, accompanied by son.\par She is s/p Keytruda cycle #26, tolerating well.\par She feels well today, appetite is good.\par She denies nausea, vomiting, diarrhea, abdominal pain, vaginal discharge or bleeding.\par She is requesting for flu vaccine.\par \par 1/20/22\par Pt is here for follow up \par Feels well. No abd pain, N, V, D\par No vag bleeding\par \par 2/9/22\par Pt is here for follow up.\par No new complaints. Feels well.\par No abd pain, N, V, D, vag bleeding

## 2022-03-01 ENCOUNTER — APPOINTMENT (OUTPATIENT)
Dept: HEMATOLOGY ONCOLOGY | Facility: CLINIC | Age: 57
End: 2022-03-01
Payer: SUBSIDIZED

## 2022-03-01 ENCOUNTER — LABORATORY RESULT (OUTPATIENT)
Age: 57
End: 2022-03-01

## 2022-03-01 VITALS
SYSTOLIC BLOOD PRESSURE: 143 MMHG | TEMPERATURE: 97.2 F | DIASTOLIC BLOOD PRESSURE: 69 MMHG | WEIGHT: 127 LBS | HEIGHT: 60 IN | HEART RATE: 64 BPM | BODY MASS INDEX: 24.94 KG/M2

## 2022-03-01 LAB
HCT VFR BLD CALC: 39.3 %
HGB BLD-MCNC: 13.4 G/DL
MCHC RBC-ENTMCNC: 29.2 PG
MCHC RBC-ENTMCNC: 34.1 G/DL
MCV RBC AUTO: 85.6 FL
PLATELET # BLD AUTO: 231 K/UL
PMV BLD: 11.3 FL
RBC # BLD: 4.59 M/UL
RBC # FLD: 14.5 %
TSH SERPL-ACNC: 0.84 UIU/ML
WBC # FLD AUTO: 10.29 K/UL

## 2022-03-01 PROCEDURE — 99215 OFFICE O/P EST HI 40 MIN: CPT

## 2022-03-01 NOTE — ASSESSMENT
[FreeTextEntry1] : This is a 56 year old female with PMH of DM II,  diagnosed with FIGO IVB well to moderately differentiated endometrioid cancer with focal squamous differentiation and focal clear cell changes.  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.\par \par NGS showed MSI-H and high tumor mutational burden\par Microsatellite status MSI-High \par Tumor Mutational [place] 42 Muts/Mb \par ARID1A P427fs*6\par ARID1A U6771tk*15\par ATRX I127fs*7\par CASP8 splice site 305+1G>A\par CCND1 P287A\par CDC73 E325*\par CREBBP A146fs*6\par CTCF T204fs*26\par FGFR3 V630M\par FLCN H429fs*39\par JAK1 K860fs*16\par KRAS G12D\par MET T723tsp\par MSH3 K383fs*32\par NFE2L2 E82D\par PDGFRB R919W\par PIK3CA R38C\par PIK3R1 F068ncn\par PTEN V166fs*14\par RNF43 G659fs*41\par TP53 D7H \par \par #FIGO IVB endometrial cancer\par - s/p 5 cycles of  Carboplatin (AUC 6) and Taxol (175 mg/m2) every 3 weeks. \par - Was switched from Carboplatin/Taxol to Keytruda.  NGS testing sent and reviewed.\par \par Although the patient had a stage IV disease at diagnosis, with neoadjuvant chemo, she got downstaged.\par Had  d/w Rad/Onc if there is any benefit for RT: no role for RT.\par \par Pt had surgery on 3/8/21. Patient had a good response to neoadjuvant treatment. Operative report showed no gross residual disease left post surgery.\par 3/5/21 Left pelvic Lns were positive for cancer, Pathologic stage pT1N1a Stage IIIC1.\par \par  has been stable.\par TSH and liver enzymes are stable.\par \par PLAN:\par Previous notes reviewed and all relevant laboratory and radiology results discussed with and communicated to the patient.\par \par -- Will continue cycle # 31 of Keytruda  mg once every 3 weeks until disease progression or unacceptable toxicity. CBC today is adequate. \par \par Side effects discussed including: Peripheral edema, cardiac arrhythmia, Fatigue, Pruritus, skin rash, Hyperglycemia, hypoalbuminemia, hypertriglyceridemia , hyponatremia , hypophosphatemia, hypocalcemia, decreased serum bicarbonate , hypercholesterolemia hypokalemia, hypoglycemia, hypercalcemia, hypothyroidism , hyperkalemia, Diarrhea , decreased appetite, constipation , abdominal pain , nausea, vomiting hematuria, anemia, lymphocytopenia, leukopenia, neutropenia, thrombocytopenia, hemorrhage, increased INR, prolonged partial thromboplastin time, abnormal liver enzymes.\par \par MONITORING as follows:\par Liver enzymes at baseline and periodically during treatment; renal function; thyroid function (at baseline, periodically during treatment and as clinically indicated); glucose; CBC with differential;\par signs/symptoms of colitis, dermatologic toxicity, hypophysitis, thyroid disorders, pneumonitis and infusion reactions.\par \par Treatment will be administered under my direct supervision\par \par -- Labs ordered today includes CMP, , TSH and Free T4.\par Discussed results of  rpt CT scans  with pt and her son. Only slight increase in lymph node size noted. Will monitor with rpt scans in 2 mths\par RTC in 3 weeks.\par \par \par \par

## 2022-03-01 NOTE — CONSULT LETTER
[Dear  ___] : Dear  [unfilled], [Courtesy Letter:] : I had the pleasure of seeing your patient, [unfilled], in my office today. [Please see my note below.] : Please see my note below. [Consult Closing:] : Thank you very much for allowing me to participate in the care of this patient.  If you have any questions, please do not hesitate to contact me. [Sincerely,] : Sincerely, [DrShabbir  ___] : Dr. THAKUR [FreeTextEntry3] : Demetrice Willard MD\par Professor [employer]\par Adam/Dorian\par Attending Physician\par [facility] [place]\par [phone]\par \par

## 2022-03-01 NOTE — HISTORY OF PRESENT ILLNESS
[de-identified] : 54 yof, PMH of DM II, presented to ED in 10/2019 after experiencing worsening of chronic, intermittent pelvic pain and vaginal bleeding.  Patient underwent transvaginal ultrasound, which showed 3.5 x 2.7 x 4.4 cm cervical mass.  CT abdomen/pelvis revealed R iliac chain Ln 1.5 x 1.4 cm, along with 2.2 x 1.8 cm portacaval LNs, and enlargement of uterus.  Endometrial biopsy revealed well to moderately differentiated endometrioid cancer with focal squamous differentiation and focal clear cell changes.  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   CT chest negative for evidence of intrathoracic metastases.  Patient was initially scheduled for ALESHA/BSO on 11/25/2019.  However, MRI pelvis revealed L adnexal peritoneal tumor implants, enlarged pelvic and retroperitoneal lymph nodes, FIGO stage IVB.  ALESHA/BSO was subsequently canceled.  Patient recently saw Dr. Perez, who recommended chemotherapy alone.  Patient is currently only having occasional, mild vaginal bleeding and intermittent pelvic pain, which responds to tylenol.\par \par Family history is significant for history of uterine cancer in paternal grandmother (per patient, diagnosed at around 55 years) and "blood cancer" in maternal grandmother.\par \par Patient lives with her family and is fully functional at baseline.  She used to work as a  but is now no longer working.  She is also is in the process of applying for Medicaid. [de-identified] : Microsatellite status MSI-High §\par Tumor Mutational [place] 42 Muts/Mb §\par ARID1A P427fs*6\par ARID1A K2013mg*15\par ATRX I127fs*7\par CASP8 splice site 305+1G>A\par CCND1 P287A\par CDC73 E325*\par CREBBP A146fs*6\par CTCF T204fs*26\par FGFR3 V630M\par FLCN H429fs*39\par JAK1 K860fs*16\par KRAS G12D\par MET F589zgz\par MSH3 K383fs*32\par NFE2L2 E82D\par PDGFRB R919W\par PIK3CA R38C\par PIK3R1 A210ysa\par PTEN V166fs*14\par RNF43 G659fs*41\par TP53 D7H  [de-identified] : 1/20/20 : The patient is here for follow up .She received 2 doses of ferraheme prior to chemo . She tolerated carbo, taxol with mild symptoms of nausea and vomiting. She lost her hair , and reported mild numbness of fingers. She reported also minimal vaginal bleed. Her abdominal pain resolved and she is able to ambulate with no problems\par \par 2/10/2020: The patient is here for follow up , she tolerated her second cycle of chemotherapy with no problems , except for mild nausea and vomiting for the first few days. She also reported some mucousy discharge from vagina. She is eating well , no abdominal pain. \par \par 3/2/2020: The patient is here for follow up . She is tolerating chemotherapy with no major side effects except for nausea in the following days.  She had a CT C/A/P after her 3rd cycle which showed mixed response. She denies any vaginal bleed, no rectal pain on defecation, no abdominal pain. \par \par \par \par 3/23/2020: The patient is here for follow up. She is tolerating chemotherapy with no major side effects . She is reporting nausea in the following days after chemotherapy. She denies fever, chills, abdominal pain , no constipation or diarrhea . No vaginal bleeding\par \par 04/07/2020\par JAY BOWMAN a 54 year F is here today for follow up of FIGO IVB endometrial cancer.\par Had  Ronnie number [id number].\par S/p 4 cycles of carbo and taxol, tolerating well. Cancelled last chemo appt due to corona virus. \par Reports lower abdominal cramping 2/10 started today. Denies vaginal bleeding today. Did have spotting x 1 since last treatment.\par Denies NVD, fever, chills. \par Pt reports peripheral neuropathy in fingers b/l not bothersome \par Had GYN f/up 3 weeks ago. \par CBC reviewed: WBC 8.6, h/H 12.8/36.9%, , Neutrophils 5.59\par \par 4/28/20: \par History obtained through  [facility] from [employer]. ID# [id number]\par Pt returns for a f/u vist. She is S/P 1st cycle of keytruda. She tolerated it fairly well. Abdominal pain has resolved. She continues to have mild yellowish vaginal discharge. No vaginal bleeding.\par \par 05/19/2020\par JAY BOWMAN a 54 year F is here today with son for follow up of FIGO IVB endometrial cancer. \par Pt prefers her son translates today's visit. \par Pt denies abdominal pain, vaginal bleeding, NVD, fever, chills. \par She is s/p 2nd cycle of Keytruda, tolerated well. \par  : 31\par \par 6/9/20\par Pt is here for follow up.\par No new complaints.\par Feels well. Denies any abd pain, nausea, vomiting, rash or fever.\par Has mild vag spotting\par \par 06/30/2020\par JAY BOWMAN a 54 year F is here today for follow up of FIGO IVB endometrial cancer. \par S/p 4 cycles of Keytruda.\par She feels well. Denies any new complaints.\par Denies fever, chills, SOB, vaginal bleeding, abdominal pain, distention. \par Last : 29\par CBC reviewed: H/H 13.5/39.2%.\par \par 7/21/2020\par 53 yo female accompanied by her son for follow up visit for endometrial cancer and evaluation prior to Keytruda #6.  She offers no new complaints.  \par Denies any fever, chills, SOB, vaginal bleeding, abdominal pain or distention.  Last : 35/39/68.\par CBC shows wbc=9.11, Hgb=13.8, lji=650, ANC=4.89.  H=1.39 on 6/30/2020. She was COVID tested 7/17/2020 not detected.\par \par 8/11/20\par Pt is here for follow up.\par No complaints of abd pain, N, v, D or vaginal bleeding.\par Has mild tingling of feet and finger tips.\par No fever.\par \par 9/1/20\par Pt is here for follow up.\par Tolerating Keytruda well.\par Patient denies abdominal pain or bloating, denies vaginal bleeding or discharge.\par Patient denies shortness of breath, denies fever, denies bone pain.\par Has occasional vaginal discomfort.\par \par 9/22/20\par Pt is here  for follow up.\par No new complaints, Denies abdominal pain, N, V, d\par No vag bleeding\par No SOB.\par \par 10/13/20\par Pt is here for follow up.\par No vag bleeding, N, V, D, abd pain.\par No side effects from immunotherapy\par \par 11/3/20\par Pt is here for follow up.\par No new complaints.\par No N, V, D, Abd pain.\par No vag bleeding.\par Tolerating immunotherapy well.\par \par 11/24/2020\par Patient is here today for follow up visit for endometrial cancer.  She is tolerating immunotherapy-  Keytruda well.  She offers no new complaints except for mild paresthesias.  Denies any N/V/D/C or vaginal bleeding.  Last =28.\par \par 12/15/20\par Pt is here for follow up visit.\par No complaints.\par Feels well, no abd pain, n. V, diarrhea.\par No vag bleeding.\par has not made appt for Ct scans yet\par \par 1/5/2021\par Pt is her to f/u for endometrial cancer\par No complaints of abdominal pain, or vaginal bleeding\par Feels well with good appetite\par Pt requesting  flu vaccine\par \par 1/26/21\par Pt is here for follow up. Feels well.\par Saw GYN and was offered interval debulking scheduled for 2/15\par No vag bleeding\par \par 2/16/21\par Pt is here for follow up.\par Her surgery has been rescheduled to 3/1/21\par Feels well, no abd pain, N, V, D, vag bleeding\par States her sugar levels have been running high. She will be seeing her PCP\par \par 3/23/21\par Pt is here for follow up. She is s/p surgery 08-Mar-2021 .\par She underwent Peritoneal biopsy \par Pelvic and para-aortic lymphadenectomy\par \par Robot-assisted omentectomy\par Hysterectomy, total,  with salpingo-oophorectomy \par Operative Findings:\par - Operative Findings EUA: Uterus 14w size, normal external genitalia, no gross\par lesions.\par Laparoscopy: Uterus, cervix bilateral fallopian tubes and ovaries grossly\par normal. Anterior abdominal wall nodularity likely cancer responding to\par treatment. No gross tumor visualized, upper abdomen without gross diease.\par Bilateral enlarged bulky pelvic LN 2-3cm in size.\par \par Pathology as noted below\par \par Pt is recovering well from surgery. Has Nl Bowel habits. No post op complications.\par Has been having high bld sugar, will talk to PCP\par \par 4/13/21\par pt is here for follow up and treatment. feels well.No abd pain, N, V, d\par No vag bleeding\par \par 5/7/21\par Pt is here for a follow up.\par Feels well. No new complaints.\par No abd pain, rash, vag bleeding\par No N, V, D, cough.\par \par 5/25/2021\par Pt is here to f/u for endometrial cancer.\par She is s/p Ketruda cycle #17, tolerating well.\par She feels well today, appetite is good.\par She denies abdominal pain, nausea/vomiting, or diarrhea.\par She c/o of mild tingling of finger and feet.\par \par 6/15/21\par Pt  is here for folow up.\par No abd pain, N, V, d, constipation.\par No vag bleeding\par \par 7/6/21\par Pt is here for follow up.\par Feels well. No new symptoms.\par No abdominal pain, N, V, D, vag bleeding.\par 7/27/21\par Pt is here for follow up.\par Feels well.\par No abd pain , N, V, D, vag bleeding\par \par 8/17/21\par Pt is feeling well.No abd pain, N, V, D\par \par 9/7/21\par Pt is here for follow up.\par Feels well. No n V, d.\par No abd pain.No vag bleeding.\par Has not gone for CT scans yet.\par \par 9/28/21\par Pt is here for follow up.\par Feels well. No abd pain, N, V, D, vag bleeding\par \par 11/10/2021\par Patient is here to follow up for endometrial cancer, accompanied by son.\par She is s/p Keytruda cycle #26, tolerating well.\par She feels well today, appetite is good.\par She denies nausea, vomiting, diarrhea, abdominal pain, vaginal discharge or bleeding.\par She is requesting for flu vaccine.\par \par 1/20/22\par Pt is here for follow up \par Feels well. No abd pain, N, V, D\par No vag bleeding\par \par 2/9/22\par Pt is here for follow up.\par No new complaints. Feels well.\par No abd pain, N, V, D, vag bleeding\par \par 3/1/22\par Pt is here for follow up.\par Feels well. No abd pain, N, V, D, vag bleeding

## 2022-03-02 ENCOUNTER — LABORATORY RESULT (OUTPATIENT)
Age: 57
End: 2022-03-02

## 2022-03-02 ENCOUNTER — APPOINTMENT (OUTPATIENT)
Dept: INFUSION THERAPY | Facility: CLINIC | Age: 57
End: 2022-03-02

## 2022-03-02 RX ORDER — PEMBROLIZUMAB 25 MG/ML
200 INJECTION, SOLUTION INTRAVENOUS ONCE
Refills: 0 | Status: COMPLETED | OUTPATIENT
Start: 2022-03-02 | End: 2022-03-02

## 2022-03-02 RX ADMIN — PEMBROLIZUMAB 216 MILLIGRAM(S): 25 INJECTION, SOLUTION INTRAVENOUS at 14:47

## 2022-03-03 LAB
HCT VFR BLD CALC: 38.2 %
HGB BLD-MCNC: 13.3 G/DL
MCHC RBC-ENTMCNC: 29.3 PG
MCHC RBC-ENTMCNC: 34.8 G/DL
MCV RBC AUTO: 84.1 FL
PLATELET # BLD AUTO: 176 K/UL
PMV BLD: 12.3 FL
RBC # BLD: 4.54 M/UL
RBC # FLD: 14.4 %
WBC # FLD AUTO: 10.98 K/UL

## 2022-03-23 ENCOUNTER — APPOINTMENT (OUTPATIENT)
Dept: INFUSION THERAPY | Facility: CLINIC | Age: 57
End: 2022-03-23

## 2022-03-23 ENCOUNTER — APPOINTMENT (OUTPATIENT)
Dept: HEMATOLOGY ONCOLOGY | Facility: CLINIC | Age: 57
End: 2022-03-23

## 2022-04-22 ENCOUNTER — OUTPATIENT (OUTPATIENT)
Dept: OUTPATIENT SERVICES | Facility: HOSPITAL | Age: 57
LOS: 1 days | Discharge: HOME | End: 2022-04-22

## 2022-04-22 ENCOUNTER — NON-APPOINTMENT (OUTPATIENT)
Age: 57
End: 2022-04-22

## 2022-04-22 ENCOUNTER — APPOINTMENT (OUTPATIENT)
Dept: INTERNAL MEDICINE | Facility: CLINIC | Age: 57
End: 2022-04-22
Payer: SELF-PAY

## 2022-04-22 VITALS
OXYGEN SATURATION: 99 % | HEART RATE: 68 BPM | SYSTOLIC BLOOD PRESSURE: 124 MMHG | WEIGHT: 128 LBS | BODY MASS INDEX: 25.13 KG/M2 | HEIGHT: 60 IN | DIASTOLIC BLOOD PRESSURE: 82 MMHG | TEMPERATURE: 98.2 F

## 2022-04-22 DIAGNOSIS — E11.9 TYPE 2 DIABETES MELLITUS W/OUT COMPLICATIONS: ICD-10-CM

## 2022-04-22 LAB
ALBUMIN SERPL ELPH-MCNC: 4.3 G/DL
ALP BLD-CCNC: 64 U/L
ALT SERPL-CCNC: 14 U/L
ANION GAP SERPL CALC-SCNC: 11 MMOL/L
AST SERPL-CCNC: 15 U/L
BILIRUB SERPL-MCNC: 0.4 MG/DL
BUN SERPL-MCNC: 16 MG/DL
CALCIUM SERPL-MCNC: 9.7 MG/DL
CHLORIDE SERPL-SCNC: 100 MMOL/L
CO2 SERPL-SCNC: 24 MMOL/L
CREAT SERPL-MCNC: 0.6 MG/DL
EGFR: 105 ML/MIN/1.73M2
GLUCOSE SERPL-MCNC: 270 MG/DL
POTASSIUM SERPL-SCNC: 4.5 MMOL/L
PROT SERPL-MCNC: 7.5 G/DL
SODIUM SERPL-SCNC: 135 MMOL/L

## 2022-04-22 PROCEDURE — 99214 OFFICE O/P EST MOD 30 MIN: CPT | Mod: GC

## 2022-04-22 NOTE — ASSESSMENT
[FreeTextEntry1] : 56F with advanced endometrial cancer and type 2 diabetes \par \par # T2DM\par - No recent A1c, will send for repeat labs \par - counselled on diet and exercise \par - Denies neuropathy/polyuria/polydipsia\par \par # Endometrial Ca, MSI high \par - Recommended she f/u with Dr. Willard and continue IO\par \par \par

## 2022-04-22 NOTE — HISTORY OF PRESENT ILLNESS
[FreeTextEntry1] : Follow-up  [de-identified] : 56F with a PMH of Stage 3C endometrial cancer on IO with Keytruda and hyperlipidemia. She presents for a follow-up last seen in Jan 2021. In the interim, she was diagnosed and treated for endometrial cancer with pelvic cordelia metastases with carbo/taxol and now is maintained on Keytruda. She had a BSO/ALESHA in March 2022. Feels overall well, but was unaware that she had to continue to get IO treatments and follow with oncology.

## 2022-04-22 NOTE — PHYSICAL EXAM
[No Acute Distress] : no acute distress [Well Nourished] : well nourished [Normal Sclera/Conjunctiva] : normal sclera/conjunctiva [Normal Outer Ear/Nose] : the outer ears and nose were normal in appearance [No Lymphadenopathy] : no lymphadenopathy [Supple] : supple [No Respiratory Distress] : no respiratory distress  [No Accessory Muscle Use] : no accessory muscle use [Normal Rate] : normal rate  [No Murmur] : no murmur heard [Soft] : abdomen soft [Non Tender] : non-tender [Non-distended] : non-distended [No Masses] : no abdominal mass palpated [Normal Bowel Sounds] : normal bowel sounds [Normal Anterior Cervical Nodes] : no anterior cervical lymphadenopathy [No Spinal Tenderness] : no spinal tenderness [No Joint Swelling] : no joint swelling [Grossly Normal Strength/Tone] : grossly normal strength/tone [Normal] : affect was normal and insight and judgment were intact

## 2022-04-27 DIAGNOSIS — C54.1 MALIGNANT NEOPLASM OF ENDOMETRIUM: ICD-10-CM

## 2022-04-27 DIAGNOSIS — E11.9 TYPE 2 DIABETES MELLITUS WITHOUT COMPLICATIONS: ICD-10-CM

## 2022-04-28 ENCOUNTER — APPOINTMENT (OUTPATIENT)
Dept: INFUSION THERAPY | Facility: CLINIC | Age: 57
End: 2022-04-28

## 2022-04-28 ENCOUNTER — APPOINTMENT (OUTPATIENT)
Dept: HEMATOLOGY ONCOLOGY | Facility: CLINIC | Age: 57
End: 2022-04-28
Payer: MEDICAID

## 2022-04-28 ENCOUNTER — OUTPATIENT (OUTPATIENT)
Dept: OUTPATIENT SERVICES | Facility: HOSPITAL | Age: 57
LOS: 1 days | Discharge: HOME | End: 2022-04-28

## 2022-04-28 ENCOUNTER — LABORATORY RESULT (OUTPATIENT)
Age: 57
End: 2022-04-28

## 2022-04-28 DIAGNOSIS — Z51.12 ENCOUNTER FOR ANTINEOPLASTIC IMMUNOTHERAPY: ICD-10-CM

## 2022-04-28 DIAGNOSIS — C54.1 MALIGNANT NEOPLASM OF ENDOMETRIUM: ICD-10-CM

## 2022-04-28 LAB
ALBUMIN SERPL ELPH-MCNC: 4.5 G/DL
ALP BLD-CCNC: 72 U/L
ALT SERPL-CCNC: 15 U/L
ANION GAP SERPL CALC-SCNC: 11 MMOL/L
AST SERPL-CCNC: 17 U/L
BILIRUB SERPL-MCNC: 0.5 MG/DL
BUN SERPL-MCNC: 13 MG/DL
CALCIUM SERPL-MCNC: 10.1 MG/DL
CHLORIDE SERPL-SCNC: 98 MMOL/L
CO2 SERPL-SCNC: 27 MMOL/L
CREAT SERPL-MCNC: 0.6 MG/DL
EGFR: 105 ML/MIN/1.73M2
GLUCOSE SERPL-MCNC: 173 MG/DL
HCT VFR BLD CALC: 41.7 %
HGB BLD-MCNC: 14.6 G/DL
MCHC RBC-ENTMCNC: 29.3 PG
MCHC RBC-ENTMCNC: 35 G/DL
MCV RBC AUTO: 83.7 FL
PLATELET # BLD AUTO: 177 K/UL
PMV BLD: 12 FL
POTASSIUM SERPL-SCNC: 5 MMOL/L
PROT SERPL-MCNC: 8.1 G/DL
RBC # BLD: 4.98 M/UL
RBC # FLD: 13.5 %
SODIUM SERPL-SCNC: 136 MMOL/L
WBC # FLD AUTO: 9.89 K/UL

## 2022-04-28 PROCEDURE — 99215 OFFICE O/P EST HI 40 MIN: CPT

## 2022-04-28 RX ORDER — PEMBROLIZUMAB 25 MG/ML
200 INJECTION, SOLUTION INTRAVENOUS ONCE
Refills: 0 | Status: COMPLETED | OUTPATIENT
Start: 2022-04-28 | End: 2022-04-28

## 2022-04-28 RX ADMIN — PEMBROLIZUMAB 216 MILLIGRAM(S): 25 INJECTION, SOLUTION INTRAVENOUS at 13:00

## 2022-04-28 NOTE — HISTORY OF PRESENT ILLNESS
[de-identified] : 54 yof, PMH of DM II, presented to ED in 10/2019 after experiencing worsening of chronic, intermittent pelvic pain and vaginal bleeding.  Patient underwent transvaginal ultrasound, which showed 3.5 x 2.7 x 4.4 cm cervical mass.  CT abdomen/pelvis revealed R iliac chain Ln 1.5 x 1.4 cm, along with 2.2 x 1.8 cm portacaval LNs, and enlargement of uterus.  Endometrial biopsy revealed well to moderately differentiated endometrioid cancer with focal squamous differentiation and focal clear cell changes.  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   CT chest negative for evidence of intrathoracic metastases.  Patient was initially scheduled for ALESHA/BSO on 11/25/2019.  However, MRI pelvis revealed L adnexal peritoneal tumor implants, enlarged pelvic and retroperitoneal lymph nodes, FIGO stage IVB.  ALESHA/BSO was subsequently canceled.  Patient recently saw Dr. Perez, who recommended chemotherapy alone.  Patient is currently only having occasional, mild vaginal bleeding and intermittent pelvic pain, which responds to tylenol.\par \par Family history is significant for history of uterine cancer in paternal grandmother (per patient, diagnosed at around 55 years) and "blood cancer" in maternal grandmother.\par \par Patient lives with her family and is fully functional at baseline.  She used to work as a  but is now no longer working.  She is also is in the process of applying for Medicaid. [de-identified] : Microsatellite status MSI-High §\par Tumor Mutational [place] 42 Muts/Mb §\par ARID1A P427fs*6\par ARID1A W9425sj*15\par ATRX I127fs*7\par CASP8 splice site 305+1G>A\par CCND1 P287A\par CDC73 E325*\par CREBBP A146fs*6\par CTCF T204fs*26\par FGFR3 V630M\par FLCN H429fs*39\par JAK1 K860fs*16\par KRAS G12D\par MET A505tsc\par MSH3 K383fs*32\par NFE2L2 E82D\par PDGFRB R919W\par PIK3CA R38C\par PIK3R1 Y040vcg\par PTEN V166fs*14\par RNF43 G659fs*41\par TP53 D7H  [de-identified] : 1/20/20 : The patient is here for follow up .She received 2 doses of ferraheme prior to chemo . She tolerated carbo, taxol with mild symptoms of nausea and vomiting. She lost her hair , and reported mild numbness of fingers. She reported also minimal vaginal bleed. Her abdominal pain resolved and she is able to ambulate with no problems\par \par 2/10/2020: The patient is here for follow up , she tolerated her second cycle of chemotherapy with no problems , except for mild nausea and vomiting for the first few days. She also reported some mucousy discharge from vagina. She is eating well , no abdominal pain. \par \par 3/2/2020: The patient is here for follow up . She is tolerating chemotherapy with no major side effects except for nausea in the following days.  She had a CT C/A/P after her 3rd cycle which showed mixed response. She denies any vaginal bleed, no rectal pain on defecation, no abdominal pain. \par \par \par \par 3/23/2020: The patient is here for follow up. She is tolerating chemotherapy with no major side effects . She is reporting nausea in the following days after chemotherapy. She denies fever, chills, abdominal pain , no constipation or diarrhea . No vaginal bleeding\par \par 04/07/2020\par JAY BOWMAN a 54 year F is here today for follow up of FIGO IVB endometrial cancer.\par Had  Ronnie number [id number].\par S/p 4 cycles of carbo and taxol, tolerating well. Cancelled last chemo appt due to corona virus. \par Reports lower abdominal cramping 2/10 started today. Denies vaginal bleeding today. Did have spotting x 1 since last treatment.\par Denies NVD, fever, chills. \par Pt reports peripheral neuropathy in fingers b/l not bothersome \par Had GYN f/up 3 weeks ago. \par CBC reviewed: WBC 8.6, h/H 12.8/36.9%, , Neutrophils 5.59\par \par 4/28/20: \par History obtained through  [facility] from [facility]. ID# [id number]\par Pt returns for a f/u vist. She is S/P 1st cycle of keytruda. She tolerated it fairly well. Abdominal pain has resolved. She continues to have mild yellowish vaginal discharge. No vaginal bleeding.\par \par 05/19/2020\par JAY BOWMAN a 54 year F is here today with son for follow up of FIGO IVB endometrial cancer. \par Pt prefers her son translates today's visit. \par Pt denies abdominal pain, vaginal bleeding, NVD, fever, chills. \par She is s/p 2nd cycle of Keytruda, tolerated well. \par  : 31\par \par 6/9/20\par Pt is here for follow up.\par No new complaints.\par Feels well. Denies any abd pain, nausea, vomiting, rash or fever.\par Has mild vag spotting\par \par 06/30/2020\par JAY BOWMAN a 54 year F is here today for follow up of FIGO IVB endometrial cancer. \par S/p 4 cycles of Keytruda.\par She feels well. Denies any new complaints.\par Denies fever, chills, SOB, vaginal bleeding, abdominal pain, distention. \par Last : 29\par CBC reviewed: H/H 13.5/39.2%.\par \par 7/21/2020\par 53 yo female accompanied by her son for follow up visit for endometrial cancer and evaluation prior to Keytruda #6.  She offers no new complaints.  \par Denies any fever, chills, SOB, vaginal bleeding, abdominal pain or distention.  Last : 35/39/68.\par CBC shows wbc=9.11, Hgb=13.8, sio=115, ANC=4.89.  H=1.39 on 6/30/2020. She was COVID tested 7/17/2020 not detected.\par \par 8/11/20\par Pt is here for follow up.\par No complaints of abd pain, N, v, D or vaginal bleeding.\par Has mild tingling of feet and finger tips.\par No fever.\par \par 9/1/20\par Pt is here for follow up.\par Tolerating Keytruda well.\par Patient denies abdominal pain or bloating, denies vaginal bleeding or discharge.\par Patient denies shortness of breath, denies fever, denies bone pain.\par Has occasional vaginal discomfort.\par \par 9/22/20\par Pt is here  for follow up.\par No new complaints, Denies abdominal pain, N, V, d\par No vag bleeding\par No SOB.\par \par 10/13/20\par Pt is here for follow up.\par No vag bleeding, N, V, D, abd pain.\par No side effects from immunotherapy\par \par 11/3/20\par Pt is here for follow up.\par No new complaints.\par No N, V, D, Abd pain.\par No vag bleeding.\par Tolerating immunotherapy well.\par \par 11/24/2020\par Patient is here today for follow up visit for endometrial cancer.  She is tolerating immunotherapy-  Keytruda well.  She offers no new complaints except for mild paresthesias.  Denies any N/V/D/C or vaginal bleeding.  Last =28.\par \par 12/15/20\par Pt is here for follow up visit.\par No complaints.\par Feels well, no abd pain, n. V, diarrhea.\par No vag bleeding.\par has not made appt for Ct scans yet\par \par 1/5/2021\par Pt is her to f/u for endometrial cancer\par No complaints of abdominal pain, or vaginal bleeding\par Feels well with good appetite\par Pt requesting  flu vaccine\par \par 1/26/21\par Pt is here for follow up. Feels well.\par Saw GYN and was offered interval debulking scheduled for 2/15\par No vag bleeding\par \par 2/16/21\par Pt is here for follow up.\par Her surgery has been rescheduled to 3/1/21\par Feels well, no abd pain, N, V, D, vag bleeding\par States her sugar levels have been running high. She will be seeing her PCP\par \par 3/23/21\par Pt is here for follow up. She is s/p surgery 08-Mar-2021 .\par She underwent Peritoneal biopsy \par Pelvic and para-aortic lymphadenectomy\par \par Robot-assisted omentectomy\par Hysterectomy, total,  with salpingo-oophorectomy \par Operative Findings:\par - Operative Findings EUA: Uterus 14w size, normal external genitalia, no gross\par lesions.\par Laparoscopy: Uterus, cervix bilateral fallopian tubes and ovaries grossly\par normal. Anterior abdominal wall nodularity likely cancer responding to\par treatment. No gross tumor visualized, upper abdomen without gross diease.\par Bilateral enlarged bulky pelvic LN 2-3cm in size.\par \par Pathology as noted below\par \par Pt is recovering well from surgery. Has Nl Bowel habits. No post op complications.\par Has been having high bld sugar, will talk to PCP\par \par 4/13/21\par pt is here for follow up and treatment. feels well.No abd pain, N, V, d\par No vag bleeding\par \par 5/7/21\par Pt is here for a follow up.\par Feels well. No new complaints.\par No abd pain, rash, vag bleeding\par No N, V, D, cough.\par \par 5/25/2021\par Pt is here to f/u for endometrial cancer.\par She is s/p Ketruda cycle #17, tolerating well.\par She feels well today, appetite is good.\par She denies abdominal pain, nausea/vomiting, or diarrhea.\par She c/o of mild tingling of finger and feet.\par \par 6/15/21\par Pt  is here for folow up.\par No abd pain, N, V, d, constipation.\par No vag bleeding\par \par 7/6/21\par Pt is here for follow up.\par Feels well. No new symptoms.\par No abdominal pain, N, V, D, vag bleeding.\par 7/27/21\par Pt is here for follow up.\par Feels well.\par No abd pain , N, V, D, vag bleeding\par \par 8/17/21\par Pt is feeling well.No abd pain, N, V, D\par \par 9/7/21\par Pt is here for follow up.\par Feels well. No n V, d.\par No abd pain.No vag bleeding.\par Has not gone for CT scans yet.\par \par 9/28/21\par Pt is here for follow up.\par Feels well. No abd pain, N, V, D, vag bleeding\par \par 11/10/2021\par Patient is here to follow up for endometrial cancer, accompanied by son.\par She is s/p Keytruda cycle #26, tolerating well.\par She feels well today, appetite is good.\par She denies nausea, vomiting, diarrhea, abdominal pain, vaginal discharge or bleeding.\par She is requesting for flu vaccine.\par \par 1/20/22\par Pt is here for follow up \par Feels well. No abd pain, N, V, D\par No vag bleeding\par \par 2/9/22\par Pt is here for follow up.\par No new complaints. Feels well.\par No abd pain, N, V, D, vag bleeding\par \par 3/1/22\par Pt is here for follow up.\par Feels well. No abd pain, N, V, D, vag bleeding\par \par 4/28/2022\par Patient is here to follow up for endometrial cancer.\par She is s/p cycle #31 Keytruda, tolerating well.\par She feels well, appetite is good.\par She denies abdominal pain, nausea, vomiting, vaginal bleeding/discharge, no diarrhea or any skin rash.

## 2022-04-28 NOTE — CONSULT LETTER
[Dear  ___] : Dear  [unfilled], [Courtesy Letter:] : I had the pleasure of seeing your patient, [unfilled], in my office today. [Please see my note below.] : Please see my note below. [Consult Closing:] : Thank you very much for allowing me to participate in the care of this patient.  If you have any questions, please do not hesitate to contact me. [Sincerely,] : Sincerely, [DrShabbir  ___] : Dr. THAKUR [FreeTextEntry3] : Demetrice Willard MD\par Professor [employer]\par Adam/Dorian\par Attending Physician\par [facility]\par [phone]\par \par

## 2022-04-28 NOTE — ASSESSMENT
[FreeTextEntry1] : This is a 56 year old female with PMH of DM II,  diagnosed with FIGO IVB well to moderately differentiated endometrioid cancer with focal squamous differentiation and focal clear cell changes.  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.\par \par NGS showed MSI-H and high tumor mutational burden\par Microsatellite status MSI-High \par Tumor Mutational [place] 42 Muts/Mb \par ARID1A P427fs*6\par ARID1A L8390nv*15\par ATRX I127fs*7\par CASP8 splice site 305+1G>A\par CCND1 P287A\par CDC73 E325*\par CREBBP A146fs*6\par CTCF T204fs*26\par FGFR3 V630M\par FLCN H429fs*39\par JAK1 K860fs*16\par KRAS G12D\par MET L706myr\par MSH3 K383fs*32\par NFE2L2 E82D\par PDGFRB R919W\par PIK3CA R38C\par PIK3R1 G357urs\par PTEN V166fs*14\par RNF43 G659fs*41\par TP53 D7H \par \par #FIGO IVB endometrial cancer\par - s/p 5 cycles of  Carboplatin (AUC 6) and Taxol (175 mg/m2) every 3 weeks. \par - Was switched from Carboplatin/Taxol to Keytruda on 4/2020, tolerating well.\par \par Although the patient had a stage IV disease at diagnosis, with neoadjuvant chemo, she got downstaged.\par Had  d/w Rad/Onc if there is any benefit for RT: no role for RT.\par \par Pt had surgery on 3/8/21. Patient had a good response to neoadjuvant treatment. Operative report showed no gross residual disease left post surgery.\par 3/5/21 Left pelvic Lns were positive for cancer, Pathologic stage pT1N1a Stage IIIC1.\par \par  has been stable.\par TSH and liver enzymes are stable.\par CT scan on 2/9/2022  showed only slight increase in retroperitoneal/iliac lymph node.\par \par PLAN:\par Previous notes reviewed and all relevant laboratory and radiology results discussed with and communicated to the patient.\par \par -- Will continue cycle # 32 of Keytruda  mg once every 3 weeks until disease progression or unacceptable toxicity. CBC today is adequate. \par \par Side effects discussed including: Peripheral edema, cardiac arrhythmia, Fatigue, Pruritus, skin rash, Hyperglycemia, hypoalbuminemia, hypertriglyceridemia , hyponatremia , hypophosphatemia, hypocalcemia, decreased serum bicarbonate , hypercholesterolemia hypokalemia, hypoglycemia, hypercalcemia, hypothyroidism , hyperkalemia, Diarrhea , decreased appetite, constipation , abdominal pain , nausea, vomiting hematuria, anemia, lymphocytopenia, leukopenia, neutropenia, thrombocytopenia, hemorrhage, increased INR, prolonged partial thromboplastin time, abnormal liver enzymes.\par \par MONITORING as follows:\par Liver enzymes at baseline and periodically during treatment; renal function; thyroid function (at baseline, periodically during treatment and as clinically indicated); glucose; CBC with differential;\par signs/symptoms of colitis, dermatologic toxicity, hypophysitis, thyroid disorders, pneumonitis and infusion reactions.\par \par Treatment will be administered under my direct supervision\par \par -- Labs ordered today includes CMP, , TSH and Free T4.\par -- Will monitor with repeat scans in next visit.\par \par RTC in 3 weeks.\par \par Case was seen and discussed with Dr. Willard who agreed with assessment and plan.

## 2022-04-29 LAB
CANCER AG125 SERPL-ACNC: 16 U/ML
TSH SERPL-ACNC: 1.79 UIU/ML

## 2022-05-19 ENCOUNTER — APPOINTMENT (OUTPATIENT)
Dept: HEMATOLOGY ONCOLOGY | Facility: CLINIC | Age: 57
End: 2022-05-19

## 2022-05-19 ENCOUNTER — APPOINTMENT (OUTPATIENT)
Dept: INFUSION THERAPY | Facility: CLINIC | Age: 57
End: 2022-05-19

## 2022-07-28 ENCOUNTER — APPOINTMENT (OUTPATIENT)
Dept: INTERNAL MEDICINE | Facility: CLINIC | Age: 57
End: 2022-07-28

## 2022-11-17 NOTE — H&P PST ADULT - OCCUPATION
Physical Therapy and Occupational Therapy  Discharge Note    Orders received. Pt currently off floor for cath lab procedure. Spoke with case management who confirms pt lives long term at nursing facility and does not require PT/OT evaluations for discharge. Will sign off from PT/OT and defer any therapy needs to facility upon return. Recommend activity per RN staff as appropriate for remainder of hospital admission. RN aware.     Bashir Louie, PT Rizwan Acevedo OTR/L #[id number] none

## 2023-10-31 ENCOUNTER — EMERGENCY (EMERGENCY)
Facility: HOSPITAL | Age: 58
LOS: 0 days | Discharge: HOME | End: 2023-10-31
Attending: EMERGENCY MEDICINE
Payer: MEDICAID

## 2023-10-31 VITALS
RESPIRATION RATE: 20 BRPM | SYSTOLIC BLOOD PRESSURE: 100 MMHG | WEIGHT: 100.09 LBS | HEIGHT: 60 IN | HEART RATE: 61 BPM | OXYGEN SATURATION: 97 % | TEMPERATURE: 96 F | DIASTOLIC BLOOD PRESSURE: 51 MMHG

## 2023-10-31 DIAGNOSIS — I82.442 ACUTE EMBOLISM AND THROMBOSIS OF LEFT TIBIAL VEIN: ICD-10-CM

## 2023-10-31 DIAGNOSIS — R30.0 DYSURIA: ICD-10-CM

## 2023-10-31 DIAGNOSIS — Z85.42 PERSONAL HISTORY OF MALIGNANT NEOPLASM OF OTHER PARTS OF UTERUS: ICD-10-CM

## 2023-10-31 DIAGNOSIS — N39.0 URINARY TRACT INFECTION, SITE NOT SPECIFIED: ICD-10-CM

## 2023-10-31 DIAGNOSIS — I82.432 ACUTE EMBOLISM AND THROMBOSIS OF LEFT POPLITEAL VEIN: ICD-10-CM

## 2023-10-31 DIAGNOSIS — E11.9 TYPE 2 DIABETES MELLITUS WITHOUT COMPLICATIONS: ICD-10-CM

## 2023-10-31 DIAGNOSIS — Z79.84 LONG TERM (CURRENT) USE OF ORAL HYPOGLYCEMIC DRUGS: ICD-10-CM

## 2023-10-31 DIAGNOSIS — Z90.710 ACQUIRED ABSENCE OF BOTH CERVIX AND UTERUS: ICD-10-CM

## 2023-10-31 DIAGNOSIS — M79.89 OTHER SPECIFIED SOFT TISSUE DISORDERS: ICD-10-CM

## 2023-10-31 DIAGNOSIS — I82.462 ACUTE EMBOLISM AND THROMBOSIS OF LEFT CALF MUSCULAR VEIN: ICD-10-CM

## 2023-10-31 DIAGNOSIS — I82.452 ACUTE EMBOLISM AND THROMBOSIS OF LEFT PERONEAL VEIN: ICD-10-CM

## 2023-10-31 DIAGNOSIS — I82.412 ACUTE EMBOLISM AND THROMBOSIS OF LEFT FEMORAL VEIN: ICD-10-CM

## 2023-10-31 LAB
ALBUMIN SERPL ELPH-MCNC: 3.4 G/DL — LOW (ref 3.5–5.2)
ALBUMIN SERPL ELPH-MCNC: 3.4 G/DL — LOW (ref 3.5–5.2)
ALP SERPL-CCNC: 56 U/L — SIGNIFICANT CHANGE UP (ref 30–115)
ALP SERPL-CCNC: 56 U/L — SIGNIFICANT CHANGE UP (ref 30–115)
ALT FLD-CCNC: 14 U/L — SIGNIFICANT CHANGE UP (ref 0–41)
ALT FLD-CCNC: 14 U/L — SIGNIFICANT CHANGE UP (ref 0–41)
ANION GAP SERPL CALC-SCNC: 11 MMOL/L — SIGNIFICANT CHANGE UP (ref 7–14)
ANION GAP SERPL CALC-SCNC: 11 MMOL/L — SIGNIFICANT CHANGE UP (ref 7–14)
APPEARANCE UR: CLEAR — SIGNIFICANT CHANGE UP
APPEARANCE UR: CLEAR — SIGNIFICANT CHANGE UP
APTT BLD: 31.3 SEC — SIGNIFICANT CHANGE UP (ref 27–39.2)
APTT BLD: 31.3 SEC — SIGNIFICANT CHANGE UP (ref 27–39.2)
AST SERPL-CCNC: 25 U/L — SIGNIFICANT CHANGE UP (ref 0–41)
AST SERPL-CCNC: 25 U/L — SIGNIFICANT CHANGE UP (ref 0–41)
BACTERIA # UR AUTO: ABNORMAL /HPF
BACTERIA # UR AUTO: ABNORMAL /HPF
BASOPHILS # BLD AUTO: 0.1 K/UL — SIGNIFICANT CHANGE UP (ref 0–0.2)
BASOPHILS # BLD AUTO: 0.1 K/UL — SIGNIFICANT CHANGE UP (ref 0–0.2)
BASOPHILS NFR BLD AUTO: 1.1 % — HIGH (ref 0–1)
BASOPHILS NFR BLD AUTO: 1.1 % — HIGH (ref 0–1)
BILIRUB SERPL-MCNC: <0.2 MG/DL — SIGNIFICANT CHANGE UP (ref 0.2–1.2)
BILIRUB SERPL-MCNC: <0.2 MG/DL — SIGNIFICANT CHANGE UP (ref 0.2–1.2)
BILIRUB UR-MCNC: NEGATIVE — SIGNIFICANT CHANGE UP
BILIRUB UR-MCNC: NEGATIVE — SIGNIFICANT CHANGE UP
BUN SERPL-MCNC: 17 MG/DL — SIGNIFICANT CHANGE UP (ref 10–20)
BUN SERPL-MCNC: 17 MG/DL — SIGNIFICANT CHANGE UP (ref 10–20)
CALCIUM SERPL-MCNC: 9 MG/DL — SIGNIFICANT CHANGE UP (ref 8.4–10.5)
CALCIUM SERPL-MCNC: 9 MG/DL — SIGNIFICANT CHANGE UP (ref 8.4–10.5)
CAST: 0 /LPF — SIGNIFICANT CHANGE UP (ref 0–4)
CAST: 0 /LPF — SIGNIFICANT CHANGE UP (ref 0–4)
CHLORIDE SERPL-SCNC: 102 MMOL/L — SIGNIFICANT CHANGE UP (ref 98–110)
CHLORIDE SERPL-SCNC: 102 MMOL/L — SIGNIFICANT CHANGE UP (ref 98–110)
CO2 SERPL-SCNC: 22 MMOL/L — SIGNIFICANT CHANGE UP (ref 17–32)
CO2 SERPL-SCNC: 22 MMOL/L — SIGNIFICANT CHANGE UP (ref 17–32)
COLOR SPEC: YELLOW — SIGNIFICANT CHANGE UP
COLOR SPEC: YELLOW — SIGNIFICANT CHANGE UP
CREAT SERPL-MCNC: 0.6 MG/DL — LOW (ref 0.7–1.5)
CREAT SERPL-MCNC: 0.6 MG/DL — LOW (ref 0.7–1.5)
DIFF PNL FLD: ABNORMAL
DIFF PNL FLD: ABNORMAL
EGFR: 104 ML/MIN/1.73M2 — SIGNIFICANT CHANGE UP
EGFR: 104 ML/MIN/1.73M2 — SIGNIFICANT CHANGE UP
EOSINOPHIL # BLD AUTO: 0.67 K/UL — SIGNIFICANT CHANGE UP (ref 0–0.7)
EOSINOPHIL # BLD AUTO: 0.67 K/UL — SIGNIFICANT CHANGE UP (ref 0–0.7)
EOSINOPHIL NFR BLD AUTO: 7.7 % — SIGNIFICANT CHANGE UP (ref 0–8)
EOSINOPHIL NFR BLD AUTO: 7.7 % — SIGNIFICANT CHANGE UP (ref 0–8)
GLUCOSE SERPL-MCNC: 118 MG/DL — HIGH (ref 70–99)
GLUCOSE SERPL-MCNC: 118 MG/DL — HIGH (ref 70–99)
GLUCOSE UR QL: NEGATIVE MG/DL — SIGNIFICANT CHANGE UP
GLUCOSE UR QL: NEGATIVE MG/DL — SIGNIFICANT CHANGE UP
HCT VFR BLD CALC: 31.8 % — LOW (ref 37–47)
HCT VFR BLD CALC: 31.8 % — LOW (ref 37–47)
HGB BLD-MCNC: 10.2 G/DL — LOW (ref 12–16)
HGB BLD-MCNC: 10.2 G/DL — LOW (ref 12–16)
IMM GRANULOCYTES NFR BLD AUTO: 0.3 % — SIGNIFICANT CHANGE UP (ref 0.1–0.3)
IMM GRANULOCYTES NFR BLD AUTO: 0.3 % — SIGNIFICANT CHANGE UP (ref 0.1–0.3)
INR BLD: 1.08 RATIO — SIGNIFICANT CHANGE UP (ref 0.65–1.3)
INR BLD: 1.08 RATIO — SIGNIFICANT CHANGE UP (ref 0.65–1.3)
KETONES UR-MCNC: NEGATIVE MG/DL — SIGNIFICANT CHANGE UP
KETONES UR-MCNC: NEGATIVE MG/DL — SIGNIFICANT CHANGE UP
LEUKOCYTE ESTERASE UR-ACNC: ABNORMAL
LEUKOCYTE ESTERASE UR-ACNC: ABNORMAL
LYMPHOCYTES # BLD AUTO: 2.07 K/UL — SIGNIFICANT CHANGE UP (ref 1.2–3.4)
LYMPHOCYTES # BLD AUTO: 2.07 K/UL — SIGNIFICANT CHANGE UP (ref 1.2–3.4)
LYMPHOCYTES # BLD AUTO: 23.7 % — SIGNIFICANT CHANGE UP (ref 20.5–51.1)
LYMPHOCYTES # BLD AUTO: 23.7 % — SIGNIFICANT CHANGE UP (ref 20.5–51.1)
MAGNESIUM SERPL-MCNC: 2 MG/DL — SIGNIFICANT CHANGE UP (ref 1.8–2.4)
MAGNESIUM SERPL-MCNC: 2 MG/DL — SIGNIFICANT CHANGE UP (ref 1.8–2.4)
MCHC RBC-ENTMCNC: 25.6 PG — LOW (ref 27–31)
MCHC RBC-ENTMCNC: 25.6 PG — LOW (ref 27–31)
MCHC RBC-ENTMCNC: 32.1 G/DL — SIGNIFICANT CHANGE UP (ref 32–37)
MCHC RBC-ENTMCNC: 32.1 G/DL — SIGNIFICANT CHANGE UP (ref 32–37)
MCV RBC AUTO: 79.9 FL — LOW (ref 81–99)
MCV RBC AUTO: 79.9 FL — LOW (ref 81–99)
MONOCYTES # BLD AUTO: 0.62 K/UL — HIGH (ref 0.1–0.6)
MONOCYTES # BLD AUTO: 0.62 K/UL — HIGH (ref 0.1–0.6)
MONOCYTES NFR BLD AUTO: 7.1 % — SIGNIFICANT CHANGE UP (ref 1.7–9.3)
MONOCYTES NFR BLD AUTO: 7.1 % — SIGNIFICANT CHANGE UP (ref 1.7–9.3)
NEUTROPHILS # BLD AUTO: 5.26 K/UL — SIGNIFICANT CHANGE UP (ref 1.4–6.5)
NEUTROPHILS # BLD AUTO: 5.26 K/UL — SIGNIFICANT CHANGE UP (ref 1.4–6.5)
NEUTROPHILS NFR BLD AUTO: 60.1 % — SIGNIFICANT CHANGE UP (ref 42.2–75.2)
NEUTROPHILS NFR BLD AUTO: 60.1 % — SIGNIFICANT CHANGE UP (ref 42.2–75.2)
NITRITE UR-MCNC: POSITIVE
NITRITE UR-MCNC: POSITIVE
NRBC # BLD: 0 /100 WBCS — SIGNIFICANT CHANGE UP (ref 0–0)
NRBC # BLD: 0 /100 WBCS — SIGNIFICANT CHANGE UP (ref 0–0)
NT-PROBNP SERPL-SCNC: 260 PG/ML — SIGNIFICANT CHANGE UP (ref 0–300)
NT-PROBNP SERPL-SCNC: 260 PG/ML — SIGNIFICANT CHANGE UP (ref 0–300)
PH UR: 6.5 — SIGNIFICANT CHANGE UP (ref 5–8)
PH UR: 6.5 — SIGNIFICANT CHANGE UP (ref 5–8)
PLATELET # BLD AUTO: 268 K/UL — SIGNIFICANT CHANGE UP (ref 130–400)
PLATELET # BLD AUTO: 268 K/UL — SIGNIFICANT CHANGE UP (ref 130–400)
PMV BLD: 10.1 FL — SIGNIFICANT CHANGE UP (ref 7.4–10.4)
PMV BLD: 10.1 FL — SIGNIFICANT CHANGE UP (ref 7.4–10.4)
POTASSIUM SERPL-MCNC: 4.6 MMOL/L — SIGNIFICANT CHANGE UP (ref 3.5–5)
POTASSIUM SERPL-MCNC: 4.6 MMOL/L — SIGNIFICANT CHANGE UP (ref 3.5–5)
POTASSIUM SERPL-SCNC: 4.6 MMOL/L — SIGNIFICANT CHANGE UP (ref 3.5–5)
POTASSIUM SERPL-SCNC: 4.6 MMOL/L — SIGNIFICANT CHANGE UP (ref 3.5–5)
PROT SERPL-MCNC: 6.8 G/DL — SIGNIFICANT CHANGE UP (ref 6–8)
PROT SERPL-MCNC: 6.8 G/DL — SIGNIFICANT CHANGE UP (ref 6–8)
PROT UR-MCNC: SIGNIFICANT CHANGE UP MG/DL
PROT UR-MCNC: SIGNIFICANT CHANGE UP MG/DL
PROTHROM AB SERPL-ACNC: 12.4 SEC — SIGNIFICANT CHANGE UP (ref 9.95–12.87)
PROTHROM AB SERPL-ACNC: 12.4 SEC — SIGNIFICANT CHANGE UP (ref 9.95–12.87)
RBC # BLD: 3.98 M/UL — LOW (ref 4.2–5.4)
RBC # BLD: 3.98 M/UL — LOW (ref 4.2–5.4)
RBC # FLD: 18 % — HIGH (ref 11.5–14.5)
RBC # FLD: 18 % — HIGH (ref 11.5–14.5)
RBC CASTS # UR COMP ASSIST: 21 /HPF — HIGH (ref 0–4)
RBC CASTS # UR COMP ASSIST: 21 /HPF — HIGH (ref 0–4)
SODIUM SERPL-SCNC: 135 MMOL/L — SIGNIFICANT CHANGE UP (ref 135–146)
SODIUM SERPL-SCNC: 135 MMOL/L — SIGNIFICANT CHANGE UP (ref 135–146)
SP GR SPEC: 1.03 — SIGNIFICANT CHANGE UP (ref 1–1.03)
SP GR SPEC: 1.03 — SIGNIFICANT CHANGE UP (ref 1–1.03)
SQUAMOUS # UR AUTO: 5 /HPF — SIGNIFICANT CHANGE UP (ref 0–5)
SQUAMOUS # UR AUTO: 5 /HPF — SIGNIFICANT CHANGE UP (ref 0–5)
TROPONIN T SERPL-MCNC: <0.01 NG/ML — SIGNIFICANT CHANGE UP
TROPONIN T SERPL-MCNC: <0.01 NG/ML — SIGNIFICANT CHANGE UP
UROBILINOGEN FLD QL: 1 MG/DL — SIGNIFICANT CHANGE UP (ref 0.2–1)
UROBILINOGEN FLD QL: 1 MG/DL — SIGNIFICANT CHANGE UP (ref 0.2–1)
WBC # BLD: 8.75 K/UL — SIGNIFICANT CHANGE UP (ref 4.8–10.8)
WBC # BLD: 8.75 K/UL — SIGNIFICANT CHANGE UP (ref 4.8–10.8)
WBC # FLD AUTO: 8.75 K/UL — SIGNIFICANT CHANGE UP (ref 4.8–10.8)
WBC # FLD AUTO: 8.75 K/UL — SIGNIFICANT CHANGE UP (ref 4.8–10.8)
WBC UR QL: 12 /HPF — HIGH (ref 0–5)
WBC UR QL: 12 /HPF — HIGH (ref 0–5)

## 2023-10-31 PROCEDURE — 93010 ELECTROCARDIOGRAM REPORT: CPT

## 2023-10-31 PROCEDURE — 83735 ASSAY OF MAGNESIUM: CPT

## 2023-10-31 PROCEDURE — 80053 COMPREHEN METABOLIC PANEL: CPT

## 2023-10-31 PROCEDURE — 85610 PROTHROMBIN TIME: CPT

## 2023-10-31 PROCEDURE — 93970 EXTREMITY STUDY: CPT | Mod: 26

## 2023-10-31 PROCEDURE — 83880 ASSAY OF NATRIURETIC PEPTIDE: CPT

## 2023-10-31 PROCEDURE — 85730 THROMBOPLASTIN TIME PARTIAL: CPT

## 2023-10-31 PROCEDURE — 99282 EMERGENCY DEPT VISIT SF MDM: CPT

## 2023-10-31 PROCEDURE — 81001 URINALYSIS AUTO W/SCOPE: CPT

## 2023-10-31 PROCEDURE — 71045 X-RAY EXAM CHEST 1 VIEW: CPT | Mod: 26

## 2023-10-31 PROCEDURE — 99285 EMERGENCY DEPT VISIT HI MDM: CPT

## 2023-10-31 PROCEDURE — 99285 EMERGENCY DEPT VISIT HI MDM: CPT | Mod: 25

## 2023-10-31 PROCEDURE — 71045 X-RAY EXAM CHEST 1 VIEW: CPT

## 2023-10-31 PROCEDURE — 96372 THER/PROPH/DIAG INJ SC/IM: CPT | Mod: XU

## 2023-10-31 PROCEDURE — 96374 THER/PROPH/DIAG INJ IV PUSH: CPT

## 2023-10-31 PROCEDURE — 85025 COMPLETE CBC W/AUTO DIFF WBC: CPT

## 2023-10-31 PROCEDURE — 93970 EXTREMITY STUDY: CPT

## 2023-10-31 PROCEDURE — 93005 ELECTROCARDIOGRAM TRACING: CPT

## 2023-10-31 PROCEDURE — 36415 COLL VENOUS BLD VENIPUNCTURE: CPT

## 2023-10-31 PROCEDURE — 84484 ASSAY OF TROPONIN QUANT: CPT

## 2023-10-31 RX ORDER — CEPHALEXIN 500 MG
1 CAPSULE ORAL
Qty: 14 | Refills: 0
Start: 2023-10-31 | End: 2023-11-06

## 2023-10-31 RX ORDER — ENOXAPARIN SODIUM 100 MG/ML
50 INJECTION SUBCUTANEOUS ONCE
Refills: 0 | Status: COMPLETED | OUTPATIENT
Start: 2023-10-31 | End: 2023-10-31

## 2023-10-31 RX ORDER — APIXABAN 2.5 MG/1
2 TABLET, FILM COATED ORAL
Qty: 120 | Refills: 0
Start: 2023-10-31 | End: 2023-11-29

## 2023-10-31 RX ORDER — CEFTRIAXONE 500 MG/1
1000 INJECTION, POWDER, FOR SOLUTION INTRAMUSCULAR; INTRAVENOUS ONCE
Refills: 0 | Status: COMPLETED | OUTPATIENT
Start: 2023-10-31 | End: 2023-10-31

## 2023-10-31 RX ADMIN — CEFTRIAXONE 100 MILLIGRAM(S): 500 INJECTION, POWDER, FOR SOLUTION INTRAMUSCULAR; INTRAVENOUS at 18:49

## 2023-10-31 RX ADMIN — ENOXAPARIN SODIUM 50 MILLIGRAM(S): 100 INJECTION SUBCUTANEOUS at 18:49

## 2023-10-31 NOTE — ED PROVIDER NOTE - PATIENT PORTAL LINK FT
You can access the FollowMyHealth Patient Portal offered by [facility] by registering at the following website: [email or web address]. By joining [facility] [employer]’s FollowMyHealth portal, you will also be able to view your health information using other applications (apps) compatible with our system.

## 2023-10-31 NOTE — ED PROVIDER NOTE - ATTENDING CONTRIBUTION TO CARE
58-year-old female PMH DM, uterine cancer status post hysterectomy/chemo/radiation presenting for evaluation of bilateral lower leg swelling for the past 2 weeks.  Patient has not seen a doctor in about a year.  Denies any associated shortness of breath, chest pain, change in exercise tolerance, fever chills, abdominal /back pain, black or bloody stools, urinary complaints or any other additional acute symptoms.  Patient came to ED today, because apparently her family insisted. Middle-age female resting in stretcher in no acute distress, PERRL, MMM, speaking full sentences, lungs CTA bilaterally, equal air entry, RRR, perfused extremities, distal pulses intact, abdomen soft/NT/ND, no palpable organomegaly, no midline spine or CVA TTP, there is  mild bilateral lower leg edema without unilateral calf swelling/tenderness to palpation, patient is awake and alert, no gross neurodeficits, normal mood and affect. Patient is primarily [place]-speaking, does not want free  services, wishes her adult son, Raza, to translate for her. Plan: Labs, chest x-ray, lower extremity duplex, EKG, reassess.  Patient and son are amenable with the plan.

## 2023-10-31 NOTE — ED PROVIDER NOTE - OBJECTIVE STATEMENT
58-year-old female with PMH of endometrial CA status post chemo/radiation/ALESHA/BSO in 2019, no longer following with heme-onc and is unsure if her cancer is still active or intermission, DM on metformin, presents to ED for evaluation of bilateral lower extremity swelling x2 weeks.  Patient states that she was in a car ride to [facility] for 3-1/2 hours 1 month ago afterward had noticed some swelling of her right leg.  Over the last 2 weeks has had intermittent swelling of her bilateral lower extremities.  Swelling is worse with ambulation and better with elevation.  She has no other complaints.  On ROS also notes some dysuria x3 days.  Denies fever/chills, cough, CP, SOB, palpitations, diaphoresis, abdominal pain, N/V/D, hematuria, vaginal bleeding or discharge, black or bloody stools.  Denies extremity numbness/weakness/paresthesias.  Denies prior history of blood clots or bleeding disorders.  Denies hormone use or tobacco use.  Denies prior history of similar swelling/heart failure.  States she no longer follows with heme-onc x1 year because she was tired of going to the doctor.

## 2023-10-31 NOTE — ED PROVIDER NOTE - CONSIDERATION OF ADMISSION OBSERVATION
Patient was found to have acute DVT, will admit for anticoagulation and further work-up Consideration of Admission/Observation

## 2023-10-31 NOTE — ED PROVIDER NOTE - CLINICAL SUMMARY MEDICAL DECISION MAKING FREE TEXT BOX
58-year-old female history of uterine cancer, no PMD visit for a year presenting for evaluation of leg swelling for 2 weeks.  Vital signs were reviewed.  Labs ordered and reviewed.  Chest x-ray depending interpreted by me as negative for acute pulmonary process, EKG is nonischemic. Lower extremity duplex positive for DVT, vascular consult, anticoagulation, admit for further work-up. 58-year-old female history of uterine cancer, no PMD visit for a year presenting for evaluation of leg swelling for 2 weeks.  Vital signs were reviewed.  Labs ordered and reviewed.  Chest x-ray depending interpreted by me as negative for acute pulmonary process, EKG is nonischemic. Lower extremity duplex positive for DVT, vascular consult, anticoagulation, admit for further work-up. Patient was found to have UTI, dose of antibiotic was ordered. 58-year-old female history of uterine cancer, no PMD visit for a year presenting for evaluation of leg swelling for 2 weeks.  Vital signs were reviewed.  Labs ordered and reviewed.  Chest x-ray depending interpreted by me as negative for acute pulmonary process, EKG is nonischemic. Lower extremity duplex positive for lt CFV to popliteal DVT, Management was discussed with vascular service, prescription for anticoagulants was sent to the patient's pharmacy, she was advised to follow-up with vascular surgeon in 2 weeks.  Antibiotic for UTI was sent to the patient's pharmacy as well.  She is stable for discharge home at present.  Strict return precautions given, and guidance provided.  Patient advised understanding examinable with discharge plan.

## 2023-10-31 NOTE — CONSULT NOTE ADULT - ASSESSMENT
ASSESSMENT:  59 y/o female with PMHx of DM, ALESHA BSO for endometrial cancer in 2019 presents to ED c/o bilateral LE swelling. States that the swelling was "on and off" initially but for the last 2 weeks the swelling has been constant. She also reports some LE pain. States that the pain is "mild" but when asked on a scale off 1-10 she says its 8/10.   On physical exam patient had bilateral LE swelling right >left   venous duplex shows left common femoral vein DVT extending to popliteal vein     PLAN:  Eliquis 10mg BID for 7 days, followed by Eliquis 5mg BID   follow up outpatient with Dr Fairbanks in 2 weeks for repeat US   Lower extremity elevation   pain control   patient may benefit from medical admission for pain control   vascular team will follow if patient gets admitted     Above plan discussed with vascular fellow Dr Anand , patient, patient family, and ED team  10-31-23 @ 20:05   ASSESSMENT:  59 y/o female with PMHx of DM, ALESHA BSO for endometrial cancer in 2019 presents to ED c/o bilateral LE swelling. States that the swelling was "on and off" initially but for the last 2 weeks the swelling has been constant. She also reports some LE pain. States that the pain is "mild" but when asked on a scale off 1-10 she says its 8/10.   On physical exam patient had bilateral LE swelling right >left   venous duplex shows left common femoral vein DVT extending to popliteal vein     PLAN:  Eliquis 10mg BID for 7 days, followed by Eliquis 5mg BID   follow up outpatient with Dr Fairbanks in 4 weeks for repeat US   Lower extremity elevation   pain control   patient may benefit from medical admission for pain control   vascular team will follow if patient gets admitted     Above plan discussed with vascular fellow Dr Anand , patient, patient family, and ED team  10-31-23 @ 20:05

## 2023-10-31 NOTE — ED PROVIDER NOTE - CARE PROVIDER_API CALL
Gely Knight  Vascular Surgery  [address], [address]  [place] [address]  Phone: [phone]  Fax: [phone]  Follow Up Time: 7-10 Days    Ignacio Campuzano  Obstetrics and Gynecology  [address] [place] [address]  Phone: [phone]  Fax: [phone]  Follow Up Time: Routine

## 2023-10-31 NOTE — ED PROVIDER NOTE - PROVIDER TOKENS
PROVIDER:[TOKEN:[[id number]],FOLLOWUP:[7-10 Days]],PROVIDER:[TOKEN:[[id number]],FOLLOWUP:[Routine]]

## 2023-10-31 NOTE — CONSULT NOTE ADULT - NS ATTEND AMEND GEN_ALL_CORE FT
I personally reviewed the venous duplex, which visualizes DVT-  Would recommend treatment per CHEST guidelines.  FU in office in 4 weeks.

## 2023-10-31 NOTE — ED PROVIDER NOTE - CARE PLAN
Principal Discharge DX:	Deep vein thrombosis of left lower limb   1 Principal Discharge DX:	Deep vein thrombosis of left lower limb  Secondary Diagnosis:	Acute UTI

## 2023-10-31 NOTE — ED PROVIDER NOTE - PROGRESS NOTE DETAILS
AIDA: UTI positive given dose of Rocephin.  Labs show normal kidney function, negative troponin, normal electrolytes.  Duplex shows extensive DVT on the left leg from common femoral to popliteal veins.  Right DVT study is negative.  Given Lovenox.  Vascular consulted and saw patient in ED, she is okay to go home.  Will discharge with Eliquis 10 mg twice daily x7 days then 5 twice daily x8 weeks, patient is to see vascular surgery in 2 weeks for repeat ultrasound.  We will give vascular and heme-onc follow-ups for reestablishment of care.  Supportive care return precautions advised.  Patient and son are comfortable with plan    Patient to be discharged from ED. Any available test results were discussed with patient and/or family. Verbal instructions given, including instructions to return to ED immediately for any new, worsening, or concerning symptoms. Patient endorsed understanding. Written discharge instructions additionally given, including follow-up plan.

## 2023-10-31 NOTE — ED PROVIDER NOTE - PHYSICAL EXAMINATION
VITAL SIGNS: I have reviewed nursing notes and confirm.  CONSTITUTIONAL: Chronically ill-appearing female in no acute distress  SKIN: Skin exam is warm and dry, no acute rash.  EYES: PERRL conjunctiva and sclera clear.  ENT: Mildly dry MM  CARD: S1, S2 normal; no murmurs, gallops, or rubs. Regular rate and rhythm.  RESP: Normal respiratory effort, no tachypnea or distress. Lungs CTAB, no wheezes, rales or rhonchi.  ABD: soft, NT/ND.  EXT: 1+ pitting edema symmetrically to bilateral lower extremities.  No calf tenderness or erythema.  Full range of motion at all joints.  NEURO: Alert, oriented. Grossly unremarkable. No focal deficits.  PSYCH: Cooperative, appropriate.

## 2023-10-31 NOTE — CONSULT NOTE ADULT - SUBJECTIVE AND OBJECTIVE BOX
GENERAL SURGERY CONSULT NOTE    Patient: JAY BOWMAN , 58y (65)Female   MRN: [id number]  Location: [facility] ED  Visit: 10-31-23 Emergency  Date: 10-31-23 @ 20:05    HPI:  59 y/o female with PMHx of DM, ALESHA BSO for endometrial cancer in 2019 presents to ED c/o bilateral LE swelling. States that the swelling was "on and off" initially but for the last 2 weeks the swelling has been constant. She also reports some LE pain. States that the pain is "mild" but when asked on a scale off 1-10 she says its 8/10. Denies any previous history off DVTs, Denies any recent illness, hospitalization, trauma, long distance travel   Son Raza at bedside served as  per patient's request     PAST MEDICAL & SURGICAL HISTORY:  DM (diabetes mellitus)      Uterine cancer      No significant past surgical history          Home Medications:  glipiZIDE 5 mg oral tablet: 1 tab(s) orally once a day (08 Mar 2021 07:30)  metFORMIN 500 mg oral tablet: 1 tab(s) orally 2 times a day (08 Mar 2021 07:30)  Vitamin D3 2000 intl units (50 mcg) oral capsule: 1 cap(s) orally once a day (08 Mar 2021 07:30)        VITALS:  T(F): 95.9 (10-31-23 @ 15:53), Max: 95.9 (10-31-23 @ 15:53)  HR: 61 (10-31-23 @ 15:53) (61 - 61)  BP: 100/51 (10-31-23 @ 15:53) (100/51 - 100/51)  RR: 20 (10-31-23 @ 15:53) (20 - 20)  SpO2: 97% (10-31-23 @ 15:53) (97% - 97%)    PHYSICAL EXAM:  General: NAD, AAOx3, calm and cooperative  Cardiac: RRR S1, S2,   Respiratory: CTAB,   Abdomen: Soft, non-distended, non-tender, n  Extremities: bilateral LE swelling, right more then left with some pitting edema. mild tenderness over bilateral calf and thighs. extremities well perfused, normal in color and temperature    MEDICATIONS  (STANDING):    MEDICATIONS  (PRN):      LAB/STUDIES:                        10.2   8.75  )-----------( 268      ( 31 Oct 2023 16:43 )             31.8     10-31    135  |  102  |  17  ----------------------------<  118<H>  4.6   |  22  |  0.6<L>    Ca    9.0      31 Oct 2023 16:43  Mg     2.0     10-31    TPro  6.8  /  Alb  3.4<L>  /  TBili  <0.2  /  DBili  x   /  AST  25  /  ALT  14  /  AlkPhos  56  10-31    PT/INR - ( 31 Oct 2023 18:35 )   PT: 12.40 sec;   INR: 1.08 ratio         PTT - ( 31 Oct 2023 18:35 )  PTT:31.3 sec  LIVER FUNCTIONS - ( 31 Oct 2023 16:43 )  Alb: 3.4 g/dL / Pro: 6.8 g/dL / ALK PHOS: 56 U/L / ALT: 14 U/L / AST: 25 U/L / GGT: x           Urinalysis Basic - ( 31 Oct 2023 17:22 )    Color: Yellow / Appearance: Clear / S.026 / pH: x  Gluc: x / Ketone: Negative mg/dL  / Bili: Negative / Urobili: 1.0 mg/dL   Blood: x / Protein: Trace mg/dL / Nitrite: Positive   Leuk Esterase: Small / RBC: 21 /HPF / WBC 12 /HPF   Sq Epi: x / Non Sq Epi: 5 /HPF / Bacteria: Moderate /HPF      CARDIAC MARKERS ( 31 Oct 2023 16:43 )  x     / <0.01 ng/mL / x     / x     / x                  IMAGING:  venous duplex: (unnoficial) left common femoral vein DVT extending to popliteal vein     ACCESS DEVICES:  [ ] Peripheral IV  [ ] Central Venous Line	[ ] R	[ ] L	[ ] IJ	[ ] Fem	[ ] SC	Placed:   [ ] Arterial Line		[ ] R	[ ] L	[ ] Fem	[ ] Rad	[ ] Ax	Placed:   [ ] PICC:					[ ] Mediport  [ ] Urinary Catheter, Date Placed:

## 2023-10-31 NOTE — ED ADULT NURSE NOTE - NSFALLUNIVINTERV_ED_ALL_ED
Bed/Stretcher in lowest position, wheels locked, appropriate side rails in place/Call bell, personal items and telephone in reach/Instruct patient to call for assistance before getting out of bed/chair/stretcher/Non-slip footwear applied when patient is off stretcher/[place] to call system/Physically safe environment - no spills, clutter or unnecessary equipment/Purposeful proactive rounding/Room/bathroom lighting operational, light cord in reach

## 2023-10-31 NOTE — ED PROVIDER NOTE - NSFOLLOWUPINSTRUCTIONS_ED_ALL_ED_FT
ANTIBIOTICS SENT TO PHARMACY FOR UTI.  ELIQUIS (BLOOD THINNER) SENT TO PHARMACY FOR DVT. TAKE 10MG TWICE A DAY FOR 10 DAYS THEN 5 MG TWICE A DAY.     FOLLOW UP WITH VASCULAR SURGERY DR. VIVEROS IN 2 WEEKS FOR REPEAT US    FOLLOW UP WITH YOUR HEMATOLOGIST/ONCOLOGIST     ________________________________________________________________________    Trombosis venosa profunda  Deep Vein Thrombosis  A person's legs with close-ups showing a normal vein, a vein with a blood clot, and a blood clot that breaks loose.  La trombosis venosa profunda (TVP) es jen afección en la que se forma un coágulo de caren en jen vena del sistema venoso profundo. Garner puede ocurrir en la parte inferior de la pierna, el muslo, la pelvis, el brazo o el jarrett. Un coágulo es caren que se ha espesado y convertido en gel o se ha solidificado. Esta afección es grave y puede poner en peligro la johnathan si el coágulo llega a las arterias de los pulmones y causa jen obstrucción (embolia pulmonar). La TVP también puede dañar las venas de la pierna, y eso puede causar jen enfermedad venosa a gt plazo, dolor en la pierna, hinchazón, cambio de color y úlceras o llagas (síndrome postrombótico).    ¿Cuáles son las causas?  Esta afección puede ser causada por lo siguiente:  Jen ralentización de la circulación sanguínea.  Daño en jen vena.  Jen afección que hace que la caren se coagule más fácilmente, oscar ciertos trastornos hemorrágicos.  ¿Qué incrementa el riesgo?  Los siguientes factores pueden hacer que sea más propenso a desarrollar esta afección:  Obesidad.  Tener edad avanzada, en especial más de 60 años.  Ser inactivo o no moverse (estilo de johnathan sedentario). Estas medidas pueden incluir:  Permanecer sentado o recostado jacob más de 4 a 6 horas por otro motivo que no sea dormir por la noche.  Estar en el hospital o someterse a jen cirugía mayor o prolongada.  Tener lesiones recientes en los huesos que reducen el movimiento, oscar roturas (fracturas), especialmente en las extremidades inferiores.  Haberse sometido a jen cirugía ortopédica reciente en las extremidades inferiores.  Estar embarazada, alyse a jeancarlos o ling dado a jeancarlos recientemente.  Christine medicamentos que contienen estrógenos, oscar las píldoras anticonceptivas o la terapia de reemplazo hormonal.  Consumir productos que contengan nicotina o tabaco, especialmente si usa un método anticonceptivo hormonal.  Tener antecedentes de jen enfermedad de los vasos sanguíneos (enfermedad vascular periférica) o enfermedad cardíaca congestiva.  Tener antecedentes de cáncer, especialmente si recibió tratamiento con quimioterapia.  ¿Cuáles son los signos o síntomas?  Los síntomas de esta afección incluyen:  Hinchazón, dolor, presión o sensibilidad en un brazo o jen pierna.  Un brazo o jen pierna se calienta, enrojece o cambia de color.  Jen pierna se torna muy pálida o azulada. Puede tener jen TVP jose a. Garner es poco frecuente.  Si el coágulo está ubicado en la pierna, puede notar que los síntomas empeoran al pararse o caminar.    En algunos casos, no hay síntomas.    ¿Cómo se diagnostica?  Esta afección se diagnostica mediante:  Los antecedentes médicos y un examen físico.  Pruebas, oscar, por ejemplo:  Análisis de caren para verificar qué tan cachorro coagula morelos caren.  Jen ecografía Doppler. Esta es la mejor forma de detectar jen TVP.  Venograma por tomografía computarizada (TC). Se inyecta un tinte de contraste en jen vena y se redd radiografías para verificar si hay coágulos. Garner es útil para las venas del pecho o de la pelvis.  ¿Cómo se trata?  El tratamiento de esta afección depende de lo siguiente:  La causa de morelos TVP.  El tamaño y la ubicación de la TVP, o tener más de jen TVP.  Morelos riesgo de tener jen hemorragia y tener más coágulos.  Otras enfermedades que puede tener.  El tratamiento puede incluir:  Christine un medicamento que diluye la caren (anticoagulante) para evitar que se formen más coágulos o que los coágulos actuales aumenten de tamaño.  Usar medias de compresión.  Aplicarse inyecciones de medicamentos para romper el coágulo (trombólisis dirigida por catéter).  Procedimientos quirúrgicos cuando la TVP es grave o difícil de tratar. Estos se pueden realizar para:  Aislar y retirar el coágulo.  Colocar un filtro en la vena cava inferior (VCI). Starr filtro se coloca en jen vena jose a que se llama vena cava inferior para capturar los coágulos de caren antes de que lleguen a los pulmones.  Puede recibir tratamientos médicos jacob 6 meses o más tiempo.    Siga estas instrucciones en morelos casa:  Si está tomando anticoagulantes, tenga en cuenta lo siguiente:    Hable con el médico antes de christine cualquier medicamento que contenga aspirina o antiinflamatorios no esteroideos (SARBJIT), oscar el ibuprofeno. Estos medicamentos aumentan el riesgo de tener jen hemorragia peligrosa.  Olivarez los medicamentos exactamente oscar se lo indicaron, todos los días a la misma hora. No se saltee jen dosis. No tome más de la dosis recetada. Garner es importante.  Pregúntele a morelos médico sobre los alimentos y medicamentos que pueden cambiar la forma en que funciona el anticoagulante o interactuar con starr. Evite estos alimentos y medicamentos si se lo indican.  Evite todo lo que pueda causar sangrados o moretones. Puede sangrar con más facilidad mientras glen anticoagulantes.  Tenga sumo cuidado al usar cuchillos, tijeras u otros objetos filosos.  Aféitese con jen rasuradora eléctrica en lugar de hacerlo con jen hoja de afeitar.  Evite las actividades que podrían causarle lesiones o moretones y siga las instrucciones para evitar las caídas.  Informe al médico si ha tenido sangrado interno, úlceras sangrantes o enfermedades neurológicas, oscar accidentes cerebrovasculares o aneurismas cerebrales.  Use un brazalete de alerta médica o lleve jen tarjeta con jen lista de los medicamentos que glen.  Instrucciones generales    Use los medicamentos de venta jose alejandro y los recetados solamente oscar se lo haya indicado el médico.  Retome eyad actividades normales según lo indicado por el médico. Pregúntele al médico qué actividades son seguras para usted.  Si se lo recomienda el médico, use medias de compresión según eyad indicaciones. Estas medias ayudan a evitar la formación de coágulos de caren y a reducir la hinchazón de las piernas. Nunca use medias de compresión para dormir por la noche.  Concurra a todas las visitas de seguimiento. Garner es importante.  Dónde buscar más información  American Heart Association (Asociación [place]ad[place]ense del [place]): www.heart.org  Centers for Disease Control and Prevention (Centros para el Control y la Prevención de Enfermedades): www.cdc.gov  National Heart, Lung, and Blood [place] (Instituto Nacional del [place], los Pulmones y la Caren): www.nhlbi.nih.gov  Comuníquese con un médico si:  Se saltea jen dosis del anticoagulante.  Tiene moretones inusuales u otros cambios de color.  Tiene dolor, hinchazón o enrojecimiento nuevos en un brazo o jen pierna o se produce un empeoramiento de alguno de estos síntomas.  Tiene entumecimiento u hormigueo que empeora en un brazo o jen pierna.  Tiene un cambio significativo de color (palidez o color azulado) en la extremidad que tiene la TVP.  Solicite ayuda de inmediato si:  Tiene signos o síntomas de que un coágulo de caren se ha movido a los pulmones. Pueden incluir:  Falta de aire.  Dolor de pecho.  Latidos cardíacos acelerados o irregulares (palpitaciones).  Sensación de desvanecimiento, mareos o desmayos.  Tos con caren.  Tiene signos o síntomas de que la caren está demasiado anticoagulada. Pueden incluir:  Caren en el vómito, las heces o la orina.  Un cherry que no lucas de sangrar.  Período menstrual más abundante que lo normal.  Dolor de manav intenso o confusión.  Estos síntomas pueden indicar jen emergencia. Solicite ayuda de inmediato. Llame al 911.  No espere a froy si los síntomas desaparecen.  No conduzca por eyad propios medios hasta el hospital.  Resumen  La trombosis venosa profunda (TVP) sucede cuando se forma un coágulo de caren en jen vena profunda. Garner puede ocurrir en la parte inferior de la pierna, el muslo, la pelvis, el brazo o el jarrett.  Los síntomas afectan el brazo o la pierna, y pueden incluir hinchazón, dolor, sensibilidad, calor, enrojecimiento o cambio de color.  Esta afección se puede tratar con medicamentos. En casos graves, se puede realizar un procedimiento o jen cirugía para extirpar o disolver los coágulos.  Si está recibiendo anticoagulantes, tómelos exactamente oscar se lo hayan indicado. No se saltee jen dosis. No tome más de lo recetado.  Obtenga ayuda de inmediato si tiene dolor de manav intenso, le falta el aire, siente dolor en el pecho, tiene latidos cardíacos acelerados o irregulares, o ve acren en el vómito, la orina o las heces.  Esta información no tiene oscar fin reemplazar el consejo del médico. Asegúrese de hacerle al médico cualquier pregunta que tenga.

## 2023-11-16 ENCOUNTER — APPOINTMENT (OUTPATIENT)
Dept: VASCULAR SURGERY | Facility: CLINIC | Age: 58
End: 2023-11-16
Payer: COMMERCIAL

## 2023-11-16 VITALS
OXYGEN SATURATION: 98 % | WEIGHT: 110 LBS | HEIGHT: 58 IN | HEART RATE: 84 BPM | BODY MASS INDEX: 23.09 KG/M2 | SYSTOLIC BLOOD PRESSURE: 164 MMHG | DIASTOLIC BLOOD PRESSURE: 82 MMHG

## 2023-11-16 DIAGNOSIS — Z86.718 PERSONAL HISTORY OF OTHER VENOUS THROMBOSIS AND EMBOLISM: ICD-10-CM

## 2023-11-16 PROCEDURE — 99204 OFFICE O/P NEW MOD 45 MIN: CPT

## 2023-11-16 PROCEDURE — 93970 EXTREMITY STUDY: CPT

## 2023-11-17 ENCOUNTER — RESULT REVIEW (OUTPATIENT)
Age: 58
End: 2023-11-17

## 2023-11-17 ENCOUNTER — APPOINTMENT (OUTPATIENT)
Dept: HEMATOLOGY ONCOLOGY | Facility: CLINIC | Age: 58
End: 2023-11-17

## 2023-11-17 ENCOUNTER — LABORATORY RESULT (OUTPATIENT)
Age: 58
End: 2023-11-17

## 2023-11-17 ENCOUNTER — OUTPATIENT (OUTPATIENT)
Dept: OUTPATIENT SERVICES | Facility: HOSPITAL | Age: 58
LOS: 1 days | End: 2023-11-17
Payer: MEDICAID

## 2023-11-17 DIAGNOSIS — C54.1 MALIGNANT NEOPLASM OF ENDOMETRIUM: ICD-10-CM

## 2023-11-17 DIAGNOSIS — R22.9 LOCALIZED SWELLING, MASS AND LUMP, UNSPECIFIED: ICD-10-CM

## 2023-11-17 DIAGNOSIS — Z00.8 ENCOUNTER FOR OTHER GENERAL EXAMINATION: ICD-10-CM

## 2023-11-17 LAB
HCT VFR BLD CALC: 34.2 %
HGB BLD-MCNC: 11.4 G/DL
MCHC RBC-ENTMCNC: 25.7 PG
MCHC RBC-ENTMCNC: 33.3 G/DL
MCV RBC AUTO: 77 FL
PLATELET # BLD AUTO: 337 K/UL
PMV BLD: 9.7 FL
RBC # BLD: 4.44 M/UL
RBC # FLD: 18.6 %
WBC # FLD AUTO: 9.43 K/UL

## 2023-11-17 PROCEDURE — 93970 EXTREMITY STUDY: CPT

## 2023-11-17 PROCEDURE — 80053 COMPREHEN METABOLIC PANEL: CPT

## 2023-11-17 PROCEDURE — 84439 ASSAY OF FREE THYROXINE: CPT

## 2023-11-17 PROCEDURE — 84443 ASSAY THYROID STIM HORMONE: CPT

## 2023-11-17 PROCEDURE — 85027 COMPLETE CBC AUTOMATED: CPT

## 2023-11-17 PROCEDURE — 86304 IMMUNOASSAY TUMOR CA 125: CPT

## 2023-11-17 PROCEDURE — 93970 EXTREMITY STUDY: CPT | Mod: 26

## 2023-11-17 PROCEDURE — 36415 COLL VENOUS BLD VENIPUNCTURE: CPT

## 2023-11-18 DIAGNOSIS — C54.1 MALIGNANT NEOPLASM OF ENDOMETRIUM: ICD-10-CM

## 2023-11-18 DIAGNOSIS — R22.9 LOCALIZED SWELLING, MASS AND LUMP, UNSPECIFIED: ICD-10-CM

## 2023-11-20 LAB
ALBUMIN SERPL ELPH-MCNC: 3.6 G/DL
ALP BLD-CCNC: 57 U/L
ALT SERPL-CCNC: 20 U/L
ANION GAP SERPL CALC-SCNC: 14 MMOL/L
AST SERPL-CCNC: 39 U/L
BILIRUB SERPL-MCNC: 0.4 MG/DL
BUN SERPL-MCNC: 7 MG/DL
CALCIUM SERPL-MCNC: 9.2 MG/DL
CANCER AG125 SERPL-ACNC: 63 U/ML
CHLORIDE SERPL-SCNC: 97 MMOL/L
CO2 SERPL-SCNC: 23 MMOL/L
CREAT SERPL-MCNC: 0.5 MG/DL
EGFR: 109 ML/MIN/1.73M2
GLUCOSE SERPL-MCNC: 109 MG/DL
POTASSIUM SERPL-SCNC: 4.5 MMOL/L
PROT SERPL-MCNC: 7.3 G/DL
SODIUM SERPL-SCNC: 134 MMOL/L
T4 FREE SERPL-MCNC: 1.5 NG/DL
TSH SERPL-ACNC: 2.41 UIU/ML

## 2023-11-27 ENCOUNTER — APPOINTMENT (OUTPATIENT)
Dept: HEMATOLOGY ONCOLOGY | Facility: CLINIC | Age: 58
End: 2023-11-27

## 2024-01-12 ENCOUNTER — APPOINTMENT (OUTPATIENT)
Dept: INTERNAL MEDICINE | Facility: CLINIC | Age: 59
End: 2024-01-12

## 2024-02-22 ENCOUNTER — APPOINTMENT (OUTPATIENT)
Dept: VASCULAR SURGERY | Facility: HOSPITAL | Age: 59
End: 2024-02-22

## 2024-08-09 NOTE — PATIENT PROFILE ADULT - HOW PATIENT ADDRESSED, PROFILE
rosanne Patient seen for nutrition assessment, BMI> 40. Reports some lactose intolerance (avoids milk and cheese when she is out). Tolerating regular texture diet, still with no BM post-operatively, noted to have bowel regimen in place. Reports her highest weight was about 6 years ago (2018) and her weight was about 400 pounds. SHe attributes that her weight increased due to living in a shelter at that time, and was reliant on ordering in/ready prepared foods. Now as her living situation has changed, she has easier access to preparing healthier foods as part of an overall healthy life style. Without questions regarding weight loss nutrition therapy, receptive to verbal review of tips for weight loss/healthy eating/healthy life style. Has been suffering from knee pain for a long time, which has negatively impacted physical activity. Enjoys bike riding and walking, and hopes to be able to resume post-operatively.